# Patient Record
Sex: FEMALE | Race: WHITE | NOT HISPANIC OR LATINO | Employment: UNEMPLOYED | URBAN - METROPOLITAN AREA
[De-identification: names, ages, dates, MRNs, and addresses within clinical notes are randomized per-mention and may not be internally consistent; named-entity substitution may affect disease eponyms.]

---

## 2018-05-21 ENCOUNTER — OFFICE VISIT (OUTPATIENT)
Dept: OBSTETRICS AND GYNECOLOGY | Age: 33
End: 2018-05-21

## 2018-05-21 ENCOUNTER — TELEPHONE (OUTPATIENT)
Dept: OBSTETRICS AND GYNECOLOGY | Age: 33
End: 2018-05-21

## 2018-05-21 ENCOUNTER — PROCEDURE VISIT (OUTPATIENT)
Dept: OBSTETRICS AND GYNECOLOGY | Age: 33
End: 2018-05-21

## 2018-05-21 ENCOUNTER — ANESTHESIA EVENT (OUTPATIENT)
Dept: PERIOP | Facility: HOSPITAL | Age: 33
End: 2018-05-21

## 2018-05-21 ENCOUNTER — HOSPITAL ENCOUNTER (OUTPATIENT)
Facility: HOSPITAL | Age: 33
Setting detail: HOSPITAL OUTPATIENT SURGERY
Discharge: HOME | End: 2018-05-21
Attending: OBSTETRICS & GYNECOLOGY | Admitting: OBSTETRICS & GYNECOLOGY

## 2018-05-21 ENCOUNTER — ANESTHESIA (OUTPATIENT)
Dept: PERIOP | Facility: HOSPITAL | Age: 33
End: 2018-05-21

## 2018-05-21 ENCOUNTER — PREP FOR SURGERY (OUTPATIENT)
Dept: OTHER | Facility: HOSPITAL | Age: 33
End: 2018-05-21

## 2018-05-21 VITALS
DIASTOLIC BLOOD PRESSURE: 64 MMHG | HEIGHT: 66 IN | BODY MASS INDEX: 28.61 KG/M2 | SYSTOLIC BLOOD PRESSURE: 120 MMHG | WEIGHT: 178 LBS

## 2018-05-21 VITALS
OXYGEN SATURATION: 100 % | TEMPERATURE: 98.3 F | DIASTOLIC BLOOD PRESSURE: 78 MMHG | HEART RATE: 77 BPM | RESPIRATION RATE: 18 BRPM | SYSTOLIC BLOOD PRESSURE: 117 MMHG

## 2018-05-21 DIAGNOSIS — N83.202 CYST OF LEFT OVARY: Primary | ICD-10-CM

## 2018-05-21 DIAGNOSIS — R10.2 PELVIC PAIN: ICD-10-CM

## 2018-05-21 DIAGNOSIS — R10.2 PELVIC PAIN: Primary | ICD-10-CM

## 2018-05-21 DIAGNOSIS — R10.30 LOWER ABDOMINAL PAIN: Primary | ICD-10-CM

## 2018-05-21 DIAGNOSIS — N83.202 CYST OF LEFT OVARY: ICD-10-CM

## 2018-05-21 LAB
ABO GROUP BLD: NORMAL
B-HCG UR QL: NEGATIVE
BASOPHILS # BLD AUTO: 0.01 10*3/MM3 (ref 0–0.2)
BASOPHILS NFR BLD AUTO: 0.2 % (ref 0–1.5)
BILIRUB BLD-MCNC: NEGATIVE MG/DL
BLD GP AB SCN SERPL QL: NEGATIVE
CLARITY, POC: CLEAR
COLOR UR: YELLOW
DEPRECATED RDW RBC AUTO: 44 FL (ref 37–54)
EOSINOPHIL # BLD AUTO: 0.02 10*3/MM3 (ref 0–0.7)
EOSINOPHIL NFR BLD AUTO: 0.3 % (ref 0.3–6.2)
ERYTHROCYTE [DISTWIDTH] IN BLOOD BY AUTOMATED COUNT: 13 % (ref 11.7–13)
GLUCOSE UR STRIP-MCNC: NEGATIVE MG/DL
HCT VFR BLD AUTO: 41.4 % (ref 35.6–45.5)
HGB BLD-MCNC: 13.4 G/DL (ref 11.9–15.5)
IMM GRANULOCYTES # BLD: 0 10*3/MM3 (ref 0–0.03)
IMM GRANULOCYTES NFR BLD: 0 % (ref 0–0.5)
INTERNAL NEGATIVE CONTROL: NEGATIVE
INTERNAL POSITIVE CONTROL: POSITIVE
KETONES UR QL: NEGATIVE
LEUKOCYTE EST, POC: ABNORMAL
LYMPHOCYTES # BLD AUTO: 1.76 10*3/MM3 (ref 0.9–4.8)
LYMPHOCYTES NFR BLD AUTO: 28.3 % (ref 19.6–45.3)
Lab: NORMAL
MCH RBC QN AUTO: 30.2 PG (ref 26.9–32)
MCHC RBC AUTO-ENTMCNC: 32.4 G/DL (ref 32.4–36.3)
MCV RBC AUTO: 93.2 FL (ref 80.5–98.2)
MONOCYTES # BLD AUTO: 0.36 10*3/MM3 (ref 0.2–1.2)
MONOCYTES NFR BLD AUTO: 5.8 % (ref 5–12)
NEUTROPHILS # BLD AUTO: 4.07 10*3/MM3 (ref 1.9–8.1)
NEUTROPHILS NFR BLD AUTO: 65.4 % (ref 42.7–76)
NITRITE UR-MCNC: NEGATIVE MG/ML
PH UR: 6 [PH] (ref 5–8)
PLATELET # BLD AUTO: 266 10*3/MM3 (ref 140–500)
PMV BLD AUTO: 9.3 FL (ref 6–12)
PROT UR STRIP-MCNC: NEGATIVE MG/DL
RBC # BLD AUTO: 4.44 10*6/MM3 (ref 3.9–5.2)
RBC # UR STRIP: NEGATIVE /UL
RH BLD: POSITIVE
SP GR UR: 1.02 (ref 1–1.03)
T&S EXPIRATION DATE: NORMAL
UROBILINOGEN UR QL: NORMAL
WBC NRBC COR # BLD: 6.22 10*3/MM3 (ref 4.5–10.7)

## 2018-05-21 PROCEDURE — 58662 LAPAROSCOPY EXCISE LESIONS: CPT | Performed by: OBSTETRICS & GYNECOLOGY

## 2018-05-21 PROCEDURE — C1765 ADHESION BARRIER: HCPCS | Performed by: OBSTETRICS & GYNECOLOGY

## 2018-05-21 PROCEDURE — 25010000002 ONDANSETRON PER 1 MG: Performed by: ANESTHESIOLOGY

## 2018-05-21 PROCEDURE — 85025 COMPLETE CBC W/AUTO DIFF WBC: CPT | Performed by: OBSTETRICS & GYNECOLOGY

## 2018-05-21 PROCEDURE — 25010000002 PROPOFOL 10 MG/ML EMULSION: Performed by: ANESTHESIOLOGY

## 2018-05-21 PROCEDURE — 25010000003 CEFAZOLIN IN DEXTROSE 2-4 GM/100ML-% SOLUTION: Performed by: ANESTHESIOLOGY

## 2018-05-21 PROCEDURE — 25010000002 DEXAMETHASONE PER 1 MG: Performed by: ANESTHESIOLOGY

## 2018-05-21 PROCEDURE — 25010000002 KETOROLAC TROMETHAMINE PER 15 MG: Performed by: ANESTHESIOLOGY

## 2018-05-21 PROCEDURE — S0260 H&P FOR SURGERY: HCPCS | Performed by: OBSTETRICS & GYNECOLOGY

## 2018-05-21 PROCEDURE — 25010000002 FENTANYL CITRATE (PF) 100 MCG/2ML SOLUTION: Performed by: ANESTHESIOLOGY

## 2018-05-21 PROCEDURE — 88307 TISSUE EXAM BY PATHOLOGIST: CPT | Performed by: OBSTETRICS & GYNECOLOGY

## 2018-05-21 PROCEDURE — 99213 OFFICE O/P EST LOW 20 MIN: CPT | Performed by: OBSTETRICS & GYNECOLOGY

## 2018-05-21 PROCEDURE — 86900 BLOOD TYPING SEROLOGIC ABO: CPT | Performed by: OBSTETRICS & GYNECOLOGY

## 2018-05-21 PROCEDURE — 86850 RBC ANTIBODY SCREEN: CPT | Performed by: OBSTETRICS & GYNECOLOGY

## 2018-05-21 PROCEDURE — 86901 BLOOD TYPING SEROLOGIC RH(D): CPT | Performed by: OBSTETRICS & GYNECOLOGY

## 2018-05-21 PROCEDURE — 76830 TRANSVAGINAL US NON-OB: CPT | Performed by: OBSTETRICS & GYNECOLOGY

## 2018-05-21 PROCEDURE — 25010000002 MIDAZOLAM PER 1 MG: Performed by: ANESTHESIOLOGY

## 2018-05-21 PROCEDURE — 81025 URINE PREGNANCY TEST: CPT | Performed by: OBSTETRICS & GYNECOLOGY

## 2018-05-21 PROCEDURE — 25010000002 PHENYLEPHRINE PER 1 ML: Performed by: ANESTHESIOLOGY

## 2018-05-21 RX ORDER — HYDRALAZINE HYDROCHLORIDE 20 MG/ML
5 INJECTION INTRAMUSCULAR; INTRAVENOUS
Status: DISCONTINUED | OUTPATIENT
Start: 2018-05-21 | End: 2018-05-22 | Stop reason: HOSPADM

## 2018-05-21 RX ORDER — PROMETHAZINE HYDROCHLORIDE 25 MG/ML
12.5 INJECTION, SOLUTION INTRAMUSCULAR; INTRAVENOUS ONCE AS NEEDED
Status: DISCONTINUED | OUTPATIENT
Start: 2018-05-21 | End: 2018-05-22 | Stop reason: HOSPADM

## 2018-05-21 RX ORDER — OXYCODONE AND ACETAMINOPHEN 7.5; 325 MG/1; MG/1
1 TABLET ORAL ONCE AS NEEDED
Status: COMPLETED | OUTPATIENT
Start: 2018-05-21 | End: 2018-05-21

## 2018-05-21 RX ORDER — PROMETHAZINE HYDROCHLORIDE 25 MG/1
12.5 TABLET ORAL ONCE AS NEEDED
Status: DISCONTINUED | OUTPATIENT
Start: 2018-05-21 | End: 2018-05-22 | Stop reason: HOSPADM

## 2018-05-21 RX ORDER — HYDROMORPHONE HYDROCHLORIDE 1 MG/ML
0.5 INJECTION, SOLUTION INTRAMUSCULAR; INTRAVENOUS; SUBCUTANEOUS
Status: DISCONTINUED | OUTPATIENT
Start: 2018-05-21 | End: 2018-05-22 | Stop reason: HOSPADM

## 2018-05-21 RX ORDER — PROMETHAZINE HYDROCHLORIDE 25 MG/1
25 TABLET ORAL ONCE AS NEEDED
Status: DISCONTINUED | OUTPATIENT
Start: 2018-05-21 | End: 2018-05-22 | Stop reason: HOSPADM

## 2018-05-21 RX ORDER — ONDANSETRON 2 MG/ML
INJECTION INTRAMUSCULAR; INTRAVENOUS AS NEEDED
Status: DISCONTINUED | OUTPATIENT
Start: 2018-05-21 | End: 2018-05-21 | Stop reason: SURG

## 2018-05-21 RX ORDER — ROCURONIUM BROMIDE 10 MG/ML
INJECTION, SOLUTION INTRAVENOUS AS NEEDED
Status: DISCONTINUED | OUTPATIENT
Start: 2018-05-21 | End: 2018-05-21 | Stop reason: SURG

## 2018-05-21 RX ORDER — SODIUM CHLORIDE 0.9 % (FLUSH) 0.9 %
1-10 SYRINGE (ML) INJECTION AS NEEDED
Status: DISCONTINUED | OUTPATIENT
Start: 2018-05-21 | End: 2018-05-21 | Stop reason: HOSPADM

## 2018-05-21 RX ORDER — FENTANYL CITRATE 50 UG/ML
50 INJECTION, SOLUTION INTRAMUSCULAR; INTRAVENOUS
Status: DISCONTINUED | OUTPATIENT
Start: 2018-05-21 | End: 2018-05-21 | Stop reason: HOSPADM

## 2018-05-21 RX ORDER — IBUPROFEN 600 MG/1
TABLET ORAL
Status: COMPLETED
Start: 2018-05-21 | End: 2018-05-21

## 2018-05-21 RX ORDER — LIDOCAINE HYDROCHLORIDE 20 MG/ML
INJECTION, SOLUTION INFILTRATION; PERINEURAL AS NEEDED
Status: DISCONTINUED | OUTPATIENT
Start: 2018-05-21 | End: 2018-05-21 | Stop reason: SURG

## 2018-05-21 RX ORDER — ONDANSETRON 2 MG/ML
4 INJECTION INTRAMUSCULAR; INTRAVENOUS ONCE AS NEEDED
Status: COMPLETED | OUTPATIENT
Start: 2018-05-21 | End: 2018-05-21

## 2018-05-21 RX ORDER — SODIUM CHLORIDE, SODIUM LACTATE, POTASSIUM CHLORIDE, CALCIUM CHLORIDE 600; 310; 30; 20 MG/100ML; MG/100ML; MG/100ML; MG/100ML
9 INJECTION, SOLUTION INTRAVENOUS CONTINUOUS
Status: DISCONTINUED | OUTPATIENT
Start: 2018-05-21 | End: 2018-05-22 | Stop reason: HOSPADM

## 2018-05-21 RX ORDER — SODIUM CHLORIDE 0.9 % (FLUSH) 0.9 %
1-10 SYRINGE (ML) INJECTION AS NEEDED
Status: CANCELLED | OUTPATIENT
Start: 2018-05-21

## 2018-05-21 RX ORDER — FLUMAZENIL 0.1 MG/ML
0.2 INJECTION INTRAVENOUS AS NEEDED
Status: DISCONTINUED | OUTPATIENT
Start: 2018-05-21 | End: 2018-05-22 | Stop reason: HOSPADM

## 2018-05-21 RX ORDER — LIDOCAINE HYDROCHLORIDE 10 MG/ML
0.5 INJECTION, SOLUTION EPIDURAL; INFILTRATION; INTRACAUDAL; PERINEURAL ONCE AS NEEDED
Status: DISCONTINUED | OUTPATIENT
Start: 2018-05-21 | End: 2018-05-21 | Stop reason: HOSPADM

## 2018-05-21 RX ORDER — HYDROCODONE BITARTRATE AND ACETAMINOPHEN 7.5; 325 MG/1; MG/1
1 TABLET ORAL ONCE AS NEEDED
Status: DISCONTINUED | OUTPATIENT
Start: 2018-05-21 | End: 2018-05-22 | Stop reason: HOSPADM

## 2018-05-21 RX ORDER — FAMOTIDINE 10 MG/ML
20 INJECTION, SOLUTION INTRAVENOUS ONCE
Status: COMPLETED | OUTPATIENT
Start: 2018-05-21 | End: 2018-05-21

## 2018-05-21 RX ORDER — EPHEDRINE SULFATE 50 MG/ML
5 INJECTION, SOLUTION INTRAVENOUS ONCE AS NEEDED
Status: DISCONTINUED | OUTPATIENT
Start: 2018-05-21 | End: 2018-05-22 | Stop reason: HOSPADM

## 2018-05-21 RX ORDER — DIPHENHYDRAMINE HYDROCHLORIDE 50 MG/ML
12.5 INJECTION INTRAMUSCULAR; INTRAVENOUS
Status: DISCONTINUED | OUTPATIENT
Start: 2018-05-21 | End: 2018-05-22 | Stop reason: HOSPADM

## 2018-05-21 RX ORDER — SODIUM CHLORIDE 9 MG/ML
INJECTION, SOLUTION INTRAVENOUS AS NEEDED
Status: DISCONTINUED | OUTPATIENT
Start: 2018-05-21 | End: 2018-05-21 | Stop reason: HOSPADM

## 2018-05-21 RX ORDER — CEFAZOLIN SODIUM 2 G/100ML
INJECTION, SOLUTION INTRAVENOUS AS NEEDED
Status: DISCONTINUED | OUTPATIENT
Start: 2018-05-21 | End: 2018-05-21 | Stop reason: SURG

## 2018-05-21 RX ORDER — LABETALOL HYDROCHLORIDE 5 MG/ML
5 INJECTION, SOLUTION INTRAVENOUS
Status: DISCONTINUED | OUTPATIENT
Start: 2018-05-21 | End: 2018-05-22 | Stop reason: HOSPADM

## 2018-05-21 RX ORDER — LIDOCAINE HYDROCHLORIDE AND EPINEPHRINE BITARTRATE 20; .01 MG/ML; MG/ML
INJECTION, SOLUTION SUBCUTANEOUS AS NEEDED
Status: DISCONTINUED | OUTPATIENT
Start: 2018-05-21 | End: 2018-05-21 | Stop reason: HOSPADM

## 2018-05-21 RX ORDER — PROPOFOL 10 MG/ML
VIAL (ML) INTRAVENOUS AS NEEDED
Status: DISCONTINUED | OUTPATIENT
Start: 2018-05-21 | End: 2018-05-21 | Stop reason: SURG

## 2018-05-21 RX ORDER — DEXAMETHASONE SODIUM PHOSPHATE 10 MG/ML
INJECTION INTRAMUSCULAR; INTRAVENOUS AS NEEDED
Status: DISCONTINUED | OUTPATIENT
Start: 2018-05-21 | End: 2018-05-21 | Stop reason: SURG

## 2018-05-21 RX ORDER — FENTANYL CITRATE 50 UG/ML
50 INJECTION, SOLUTION INTRAMUSCULAR; INTRAVENOUS
Status: DISCONTINUED | OUTPATIENT
Start: 2018-05-21 | End: 2018-05-22 | Stop reason: HOSPADM

## 2018-05-21 RX ORDER — IBUPROFEN 600 MG/1
600 TABLET ORAL ONCE AS NEEDED
Status: COMPLETED | OUTPATIENT
Start: 2018-05-21 | End: 2018-05-21

## 2018-05-21 RX ORDER — NALOXONE HCL 0.4 MG/ML
0.2 VIAL (ML) INJECTION AS NEEDED
Status: DISCONTINUED | OUTPATIENT
Start: 2018-05-21 | End: 2018-05-22 | Stop reason: HOSPADM

## 2018-05-21 RX ORDER — MIDAZOLAM HYDROCHLORIDE 1 MG/ML
2 INJECTION INTRAMUSCULAR; INTRAVENOUS
Status: DISCONTINUED | OUTPATIENT
Start: 2018-05-21 | End: 2018-05-21 | Stop reason: HOSPADM

## 2018-05-21 RX ORDER — KETOROLAC TROMETHAMINE 30 MG/ML
INJECTION, SOLUTION INTRAMUSCULAR; INTRAVENOUS AS NEEDED
Status: DISCONTINUED | OUTPATIENT
Start: 2018-05-21 | End: 2018-05-21 | Stop reason: SURG

## 2018-05-21 RX ORDER — MIDAZOLAM HYDROCHLORIDE 1 MG/ML
1 INJECTION INTRAMUSCULAR; INTRAVENOUS
Status: DISCONTINUED | OUTPATIENT
Start: 2018-05-21 | End: 2018-05-21 | Stop reason: HOSPADM

## 2018-05-21 RX ORDER — IBUPROFEN 600 MG/1
600 TABLET ORAL EVERY 6 HOURS PRN
Qty: 28 TABLET | Refills: 0 | Status: SHIPPED | OUTPATIENT
Start: 2018-05-21 | End: 2018-06-26

## 2018-05-21 RX ORDER — PROMETHAZINE HYDROCHLORIDE 25 MG/1
25 SUPPOSITORY RECTAL ONCE AS NEEDED
Status: DISCONTINUED | OUTPATIENT
Start: 2018-05-21 | End: 2018-05-22 | Stop reason: HOSPADM

## 2018-05-21 RX ORDER — OXYCODONE HYDROCHLORIDE AND ACETAMINOPHEN 5; 325 MG/1; MG/1
2 TABLET ORAL EVERY 6 HOURS PRN
Qty: 20 TABLET | Refills: 0 | Status: SHIPPED | OUTPATIENT
Start: 2018-05-21 | End: 2018-06-26

## 2018-05-21 RX ADMIN — FENTANYL CITRATE 50 MCG: 50 INJECTION INTRAMUSCULAR; INTRAVENOUS at 20:03

## 2018-05-21 RX ADMIN — ROCURONIUM BROMIDE 10 MG: 10 INJECTION INTRAVENOUS at 19:54

## 2018-05-21 RX ADMIN — FENTANYL CITRATE 50 MCG: 50 INJECTION INTRAMUSCULAR; INTRAVENOUS at 21:14

## 2018-05-21 RX ADMIN — DEXAMETHASONE SODIUM PHOSPHATE 8 MG: 10 INJECTION INTRAMUSCULAR; INTRAVENOUS at 18:24

## 2018-05-21 RX ADMIN — MIDAZOLAM 1 MG: 1 INJECTION INTRAMUSCULAR; INTRAVENOUS at 17:59

## 2018-05-21 RX ADMIN — IBUPROFEN 600 MG: 600 TABLET ORAL at 21:41

## 2018-05-21 RX ADMIN — PHENYLEPHRINE HYDROCHLORIDE 100 MCG: 10 INJECTION INTRAVENOUS at 18:34

## 2018-05-21 RX ADMIN — FENTANYL CITRATE 50 MCG: 50 INJECTION INTRAMUSCULAR; INTRAVENOUS at 16:59

## 2018-05-21 RX ADMIN — KETOROLAC TROMETHAMINE 30 MG: 30 INJECTION, SOLUTION INTRAMUSCULAR; INTRAVENOUS at 20:17

## 2018-05-21 RX ADMIN — ONDANSETRON 4 MG: 2 INJECTION INTRAMUSCULAR; INTRAVENOUS at 20:17

## 2018-05-21 RX ADMIN — ONDANSETRON 4 MG: 2 INJECTION INTRAMUSCULAR; INTRAVENOUS at 21:14

## 2018-05-21 RX ADMIN — PROPOFOL 160 MG: 10 INJECTION, EMULSION INTRAVENOUS at 18:10

## 2018-05-21 RX ADMIN — CEFAZOLIN SODIUM 2 G: 2 INJECTION, SOLUTION INTRAVENOUS at 18:15

## 2018-05-21 RX ADMIN — FAMOTIDINE 20 MG: 10 INJECTION, SOLUTION INTRAVENOUS at 16:59

## 2018-05-21 RX ADMIN — FENTANYL CITRATE 100 MCG: 50 INJECTION INTRAMUSCULAR; INTRAVENOUS at 18:10

## 2018-05-21 RX ADMIN — ROCURONIUM BROMIDE 40 MG: 10 INJECTION INTRAVENOUS at 18:10

## 2018-05-21 RX ADMIN — FENTANYL CITRATE 50 MCG: 50 INJECTION INTRAMUSCULAR; INTRAVENOUS at 19:23

## 2018-05-21 RX ADMIN — SUGAMMADEX 161 MG: 100 INJECTION, SOLUTION INTRAVENOUS at 20:19

## 2018-05-21 RX ADMIN — SODIUM CHLORIDE, POTASSIUM CHLORIDE, SODIUM LACTATE AND CALCIUM CHLORIDE 9 ML/HR: 600; 310; 30; 20 INJECTION, SOLUTION INTRAVENOUS at 17:00

## 2018-05-21 RX ADMIN — OXYCODONE HYDROCHLORIDE AND ACETAMINOPHEN 1 TABLET: 7.5; 325 TABLET ORAL at 21:37

## 2018-05-21 RX ADMIN — LIDOCAINE HYDROCHLORIDE 80 MG: 20 INJECTION, SOLUTION INFILTRATION; PERINEURAL at 18:10

## 2018-05-21 NOTE — PROGRESS NOTES
Subjective     Chief Complaint   Patient presents with   • Gynecologic Exam     Problem:Left side lower abdominal pain x last 3 nights       History of Present Illness  Stephanie Cobb is a 32 y.o. female is being seen today for Evaluation of pelvic pain. Patient has had several negative pregnancy test. Her cycles are normal 33-35 days apart. She is a little bit late this cycle as her last menstrual period was about 6 weeks ago. She has had intermittent pelvic pain more pronounced on the left side that seems to come and go. Denies fever chills nausea vomiting. Her appetite is okay and her other activities are normal  Chief Complaint   Patient presents with   • Gynecologic Exam     Problem:Left side lower abdominal pain x last 3 nights   .        The following portions of the patient's history were reviewed and updated as appropriate: allergies, current medications, past family history, past medical history, past social history, past surgical history and problem list.    PAST MEDICAL HISTORY  Past Medical History:   Diagnosis Date   • Urinary tract infection      OB History      Para Term  AB Living    3 3 3     3    SAB TAB Ectopic Molar Multiple Live Births            0 3        Past Surgical History:   Procedure Laterality Date   • DENTAL PROCEDURE Left    • WISDOM TOOTH EXTRACTION       Family History   Problem Relation Age of Onset   • Hypertension Father    • Hypertension Maternal Grandfather      History   Smoking Status   • Never Smoker   Smokeless Tobacco   • Never Used       Current Outpatient Prescriptions:   •  ibuprofen (ADVIL,MOTRIN) 600 MG tablet, Take 1 tablet by mouth every 8 (eight) hours as needed for mild pain (1-3)., Disp: 40 tablet, Rfl: 3  •  Prenatal Vit-Fe Fumarate-FA (PRENATAL, CLASSIC, VITAMIN) 28-0.8 MG tablet tablet, Take  by mouth daily., Disp: , Rfl:     There is no immunization history on file for this patient.    Review of Systems       Except as outlined in history of  physical illness, patient denies any changes in her GYN, , GI systems. All other systems reviewed are negative.    Objective   Physical Exam   Alert and oriented, respirations unlabored, heart regular rate and rhythm, Abdomen soft benign positive bowel sounds no rebound no guarding   PelvicExternal genitalia normal vagina cervix uterus normal right adnexa and normal left adnexa is enlarged and tender to palpation. Consistent with 8-10 cm in size Rectal exam confirms      Assessment/Plan   Stephanie was seen today for gynecologic exam.    Diagnoses and all orders for this visit:    Lower abdominal pain  -     POC Pregnancy, Urine    Pelvic pain  Negative UCG, suspect left ovarian cyst. We'll check ultrasound.    Ultrasound confirms 10-11 cm left ovarian cyst very tender to touch. Simple outer wall on ultrasound. Because of patient's discomfort she would like to proceed with surgical evaluation. We have discussed risks benefits potential need for oophorectomy. Risk of bleeding infection injury to other organs and anesthetic complications and potential need for follow-up corrective surgeries reviewed.               EMR Dragon/ Transcription disclaimer:  Much of the encounter note is an electronic transcription/translation of spoken language to printed text. The electronic translation of spoken language may permit erroneous, or at times, nonessential words or phrases to be inadvertently transcribes; Although i have reviewed the note for such errors, some may still exist.

## 2018-05-21 NOTE — TELEPHONE ENCOUNTER
Pt states on day 40 of cycle, for the last 3 nights she has experienced lower abd pain that radiates around her side and into her back. Pt has considered going to ER pain is so bad. Pt states - UPT. Please advise.    Pt # [phone]

## 2018-05-21 NOTE — ANESTHESIA PREPROCEDURE EVALUATION
Anesthesia Evaluation     Patient summary reviewed   NPO Solid Status: > 6 hours  NPO Liquid Status: > 2 hours           Airway   Mallampati: I  TM distance: >3 FB  Dental      Pulmonary    Cardiovascular     Rhythm: regular  Rate: normal        Neuro/Psych  GI/Hepatic/Renal/Endo      Musculoskeletal     Abdominal    Substance History      OB/GYN          Other                        Anesthesia Plan    ASA 1 - emergent     general     intravenous induction   Anesthetic plan and risks discussed with patient.

## 2018-05-21 NOTE — ANESTHESIA PROCEDURE NOTES
Airway  Urgency: elective    Airway not difficult    General Information and Staff    Patient location during procedure: OR  Anesthesiologist: BROOKE MCNAIR    Indications and Patient Condition  Indications for airway management: airway protection    Preoxygenated: yes  Mask difficulty assessment: 1 - vent by mask    Final Airway Details  Final airway type: endotracheal airway      Successful airway: ETT  Cuffed: yes   Successful intubation technique: direct laryngoscopy  Endotracheal tube insertion site: oral  Blade: Tori  ETT size: 7.0 mm  Cormack-Lehane Classification: grade I - full view of glottis  Placement verified by: chest auscultation and capnometry   Cuff volume (mL): 4  Measured from: teeth  ETT to teeth (cm): 21  Number of attempts at approach: 1

## 2018-05-22 ENCOUNTER — TELEPHONE (OUTPATIENT)
Dept: OBSTETRICS AND GYNECOLOGY | Age: 33
End: 2018-05-22

## 2018-05-22 NOTE — ANESTHESIA POSTPROCEDURE EVALUATION
Patient: Stephanie Cobb    Procedure Summary     Date:  05/21/18 Room / Location:  [facility] OR [address] [place] [address] MAIN OR    Anesthesia Start:  1802 Anesthesia Stop:  2042    Procedure:  DIAGNOSTIC LAPAROSCOPY/LT.OVARIAN CYSTECTOMY (Left Abdomen) Diagnosis:       Cyst of left ovary      (Cyst of left ovary [N83.202])    Surgeon:  Juan Ramon Dias MD Provider:  Bassem Jacobson MD    Anesthesia Type:  general ASA Status:  1 - Emergent          Anesthesia Type: general  Last vitals  BP   120/79 (05/21/18 2110)   Temp   36.8 °C (98.3 °F) (05/21/18 2040)   Pulse   77 (05/21/18 2110)   Resp   18 (05/21/18 2110)     SpO2   100 % (05/21/18 2110)     Post Anesthesia Care and Evaluation    Patient location during evaluation: bedside  Patient participation: complete - patient participated  Level of consciousness: awake  Pain management: adequate  Airway patency: patent  Anesthetic complications: No anesthetic complications    Cardiovascular status: acceptable  Respiratory status: acceptable  Hydration status: acceptable    Comments: */79   Pulse 77   Temp 36.8 °C (98.3 °F) (Oral)   Resp 18   LMP 04/12/2018   SpO2 100%

## 2018-05-22 NOTE — PROGRESS NOTES
Phone conversation. Called patient this morning following her surgery. She states she feels so much better. Is voiding and passing flatus and pain is being controlled with ibuprofen. Discussed surgical findings and need for follow-up in 1 week. Patient will call with any changes.

## 2018-05-22 NOTE — OP NOTE
Laparoscopy with left ovarian cystectomy Procedure Note    Indications: The patient is a 32 y.o. female with large left ovarian cyst with worsening pelvic pain.    Pre-operative Diagnosis: Left ovarian cyst, 10-12 cm    Post-operative Diagnosis: same, 12 cm, normal right tube and ovary. Normal liver and gallbladder normal upper and lower abdomen as seen on    Surgeon: Juan Ramon Dias MD     Assistants: Juan Ramon Moser    Anesthesia: General endotracheal anesthesia    ASA Class: 1    Procedure Details   The patient was seen in the Holding Room. The risks, benefits, complications, treatment options, and expected outcomes were discussed with the patient. The possibilities of reaction to medication, pulmonary aspiration, perforation of viscus, bleeding, recurrent infection, the need for additional procedures, failure to diagnose a condition, and creating a complication requiring transfusion or operation were discussed with the patient. The patient concurred with the proposed plan, giving informed consent. The patient was taken to the Operating Room, identified as Stephanie Cobb and the procedure verified as Diagnostic Laparoscopy. A Time Out was held and the above information confirmed.    After induction of general anesthesia, the patient was placed in modified dorsal lithotomy position where she was prepped, draped, and catheterized in the normal, sterile fashion.    The cervix was visualized and an intrauterine manipulator was placed. 9 mm umbilical incision was then performed. Veress needle was passed and pneumoperitoneum was established. The 10 trocar was utilized and anchored to the fascia after extending the incisions. The above findings were noted. .  2 more 5 mm incisions were incised lateral to the umbilical incision. As done as we transilluminated the abdominal wall. entry was visualized with the laparoscope. The uterus was elevated after we inserted 2 more 5 mm trochars and the above-mentioned findings were  noted. A small puncture was made in the top of the ovarian cyst and approximately 250 cc of fluid was aspirated. Irrigation was performed. Then we utilized the Harmonic to excise the ovarian wall circumferentially down to what we thought was the base of the normal ovarian tissue.. This required us to excise 3 separate strips of ovarian cyst wall. These were placed anterior to the uterus. Several attempts to try to peel out any remaining ovarian cyst membranes on the residual ovary were unsuccessful. The Harmonic was utilized with the active blade to paint brush in a little cautery over the residual open ovarian sidewalls. Everything was hemostatic at this stage.  Several more rounds of irrigation were performed. The 1012 trocar was in place. Through this the Endopouch was utilized. The ovarian cyst walls were inserted and retrieved under visualization. The mid trocar was removed Allis clamps were placed. Irrigation was used visualization found having to be hemostatic no other abnormalities were seen. Incisions were closed by closing the fascia on the middle incision with 0 Vicryl. Then the skin edges were reapproximated using 4-0 Vicryl. Betadine Steri-Strips and bandages were applied. Hulka tenaculum was removed. The Zuniga catheter which had been inserted prior to the procedure was removed. Clear urine was noted. She was awakened and taken recovery room in stable condition      Following the procedure the umbilical sheath was removed after intra-abdominal carbon dioxide was expressed. The incision was closed with subcutaneous and subcuticular sutures of 4-0 Vicryl. The intrauterine manipulator was then removed.    Instrument, sponge, and needle counts were correct prior to abdominal closure and at the conclusion of the case.     Findings:  As outlined above, a 10-12 cm left ovarian cyst smooth edges were noted,no excrescences were seen. The uterus opposite tube and ovary were normal liver appeared normal as did  gallbladder.    Estimated Blood Loss:  less than 50 mL           Drains: None                      S

## 2018-05-23 ENCOUNTER — TELEPHONE (OUTPATIENT)
Dept: OBSTETRICS AND GYNECOLOGY | Age: 33
End: 2018-05-23

## 2018-05-23 LAB
CYTO UR: NORMAL
LAB AP CASE REPORT: NORMAL
Lab: NORMAL
PATH REPORT.FINAL DX SPEC: NORMAL
PATH REPORT.GROSS SPEC: NORMAL

## 2018-05-23 NOTE — TELEPHONE ENCOUNTER
----- Message from Juan Ramon Dias MD sent at 5/23/2018  1:01 PM EDT -----  Please notify pt. That labs are with in normal limits

## 2018-05-29 ENCOUNTER — OFFICE VISIT (OUTPATIENT)
Dept: OBSTETRICS AND GYNECOLOGY | Age: 33
End: 2018-05-29

## 2018-05-29 VITALS
SYSTOLIC BLOOD PRESSURE: 106 MMHG | WEIGHT: 173 LBS | HEIGHT: 66 IN | DIASTOLIC BLOOD PRESSURE: 68 MMHG | BODY MASS INDEX: 27.8 KG/M2

## 2018-05-29 DIAGNOSIS — Z09 POSTOP CHECK: ICD-10-CM

## 2018-05-29 DIAGNOSIS — I80.01 THROMBOPHLEBITIS OF SUPERFICIAL VEINS OF RIGHT LOWER EXTREMITY: ICD-10-CM

## 2018-05-29 DIAGNOSIS — R53.83 OTHER FATIGUE: Primary | ICD-10-CM

## 2018-05-29 PROCEDURE — 99024 POSTOP FOLLOW-UP VISIT: CPT | Performed by: OBSTETRICS & GYNECOLOGY

## 2018-05-29 RX ORDER — ACETAMINOPHEN AND CODEINE PHOSPHATE 300; 30 MG/1; MG/1
TABLET ORAL
Refills: 0 | COMMUNITY
Start: 2018-05-21 | End: 2018-06-26

## 2018-05-29 NOTE — PROGRESS NOTES
Postop 1 week incision check  Overall patient is feeling better. She was seen in the emergency room at [facility] for what turned out to be superficial thrombophlebitis of her right leg. Patient states Doppler scanning were negative for any evidence DVT.  Additionally, patient has a little bit of fatigue and we're going to check a CBC today.  She is tolerating regular food ambulating voiding.  Incisions abdomen bimanual exam normal extremities show negative Homans sign bilaterally, good pulses bilaterally there's a very small superficial thrombophlebitis on the medial aspect of the right leg at the knee level.  1 week postop incision check doing well pathology benign. ER evaluation normal recommend baby aspirin warm compresses. We'll check a CBC today return in 5 weeks otherwise

## 2018-05-30 LAB
ERYTHROCYTE [DISTWIDTH] IN BLOOD BY AUTOMATED COUNT: 13.2 % (ref 11.7–13)
HCT VFR BLD AUTO: 41.2 % (ref 35.6–45.5)
HGB BLD-MCNC: 13.5 G/DL (ref 11.9–15.5)
MCH RBC QN AUTO: 30.5 PG (ref 26.9–32)
MCHC RBC AUTO-ENTMCNC: 32.8 G/DL (ref 32.4–36.3)
MCV RBC AUTO: 93.2 FL (ref 80.5–98.2)
PLATELET # BLD AUTO: 333 10*3/MM3 (ref 140–500)
RBC # BLD AUTO: 4.42 10*6/MM3 (ref 3.9–5.2)
WBC # BLD AUTO: 5.77 10*3/MM3 (ref 4.5–10.7)

## 2018-06-18 ENCOUNTER — TELEPHONE (OUTPATIENT)
Dept: OBSTETRICS AND GYNECOLOGY | Age: 33
End: 2018-06-18

## 2018-06-18 NOTE — TELEPHONE ENCOUNTER
Pt states she has a cystectomy on 05/21/2018, has noticed an increase in fatigue since then as well as no period since 04/12/2018. Pt states she is very regular and is concerned. Pt wants to know if she needs to be seen before her post-op on 07/03/2018. Please advise    Pt # [phone]    It is not unusual to skip a period after that surgery, fatigue is expected. I would be glad to see her for her postop visit however if she would like to be seen sooner

## 2018-06-26 ENCOUNTER — OFFICE VISIT (OUTPATIENT)
Dept: OBSTETRICS AND GYNECOLOGY | Age: 33
End: 2018-06-26

## 2018-06-26 VITALS
SYSTOLIC BLOOD PRESSURE: 126 MMHG | WEIGHT: 174 LBS | BODY MASS INDEX: 27.97 KG/M2 | HEIGHT: 66 IN | DIASTOLIC BLOOD PRESSURE: 70 MMHG

## 2018-06-26 DIAGNOSIS — Z09 POSTOP CHECK: Primary | ICD-10-CM

## 2018-06-26 DIAGNOSIS — N83.202 CYST OF LEFT OVARY: ICD-10-CM

## 2018-06-26 PROCEDURE — 99024 POSTOP FOLLOW-UP VISIT: CPT | Performed by: OBSTETRICS & GYNECOLOGY

## 2018-06-26 NOTE — PROGRESS NOTES
June 26, 2018    Postoperative examination    174 pounds, blood pressure 126/70    Patient underwent a laparoscopic left ovarian cystectomy. 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 Her energy levels have come back. She's had relations, she is tolerating regular food ambulating passing flatus having normal bowel movements    Incisions are clean dry intact, abdomen is soft nontender extra genitalia normal vagina cervix uterus adnexa normal    Normal postoperative exam pathology reviewed patient is moving to [facility] for a  year. Patient will call with any proms questions concerns otherwise

## 2018-09-12 ENCOUNTER — OFFICE VISIT (OUTPATIENT)
Dept: OBSTETRICS AND GYNECOLOGY | Age: 33
End: 2018-09-12

## 2018-09-12 ENCOUNTER — PROCEDURE VISIT (OUTPATIENT)
Dept: OBSTETRICS AND GYNECOLOGY | Age: 33
End: 2018-09-12

## 2018-09-12 VITALS
SYSTOLIC BLOOD PRESSURE: 104 MMHG | HEIGHT: 66 IN | DIASTOLIC BLOOD PRESSURE: 64 MMHG | BODY MASS INDEX: 27.64 KG/M2 | WEIGHT: 172 LBS

## 2018-09-12 DIAGNOSIS — O36.80X0 ENCOUNTER TO DETERMINE FETAL VIABILITY OF PREGNANCY, SINGLE OR UNSPECIFIED FETUS: Primary | ICD-10-CM

## 2018-09-12 DIAGNOSIS — Z34.90 EARLY STAGE OF PREGNANCY: Primary | ICD-10-CM

## 2018-09-12 PROCEDURE — 76817 TRANSVAGINAL US OBSTETRIC: CPT | Performed by: OBSTETRICS & GYNECOLOGY

## 2018-09-12 PROCEDURE — 0500F INITIAL PRENATAL CARE VISIT: CPT | Performed by: PHYSICIAN ASSISTANT

## 2018-09-12 NOTE — PROGRESS NOTES
"Subjective     Chief Complaint   Patient presents with   • Possible Pregnancy     pregnancy confirmation       Stephanie Cobb is a 33 y.o.  whose LMP is Patient's last menstrual period was 2018 (exact date). presents to establish pregnancy  She is scheduled for an u/s today  LMP was   Says her periods are \"long\"  She is a pt of Dr Dias's  He has delivered her other children  She is currently living out of town in [place]  Plans to move back in Dec  Has questions about how to manage her ob care remotely.       No Additional Complaints Reported    The following portions of the patient's history were reviewed and updated as appropriate:vital signs, allergies, current medications, past family history, past medical history, past social history, past surgical history and problem list      Review of Systems   Genitourinary:positive for pregnancy  Endocrine: positive for fatigue     Objective      /64   Ht 167.6 cm (66\")   Wt 78 kg (172 lb)   LMP 2018 (Exact Date)   BMI 27.76 kg/m²     Physical Exam   HENT:   Mouth/Throat: No oropharyngeal exudate.       General:   alert, comfortable and no distress   Heart: Not performed today   Lungs: Not performed today.   Breast: Not performed today   Neck: na   Abdomen: {Not performed today   CVA: Not performed today   Pelvis: Not performed today   Extremities: Not performed today   Neurologic: negative   Psychiatric: Normal affect, judgement, and mood       Lab Review   Labs: No data reviewed     Imaging   Ultrasound - Pelvic Vaginal  U/s shows GS measuring 6 wks 1 day.  Yolk sac, no fetal pole seen. Follicles noted, right measures 1.95 x 1.61 and left measures1.9 x 1.61 cm    Assessment/Plan     ASSESSMENT  1. Early stage of pregnancy        PLAN  1. D/w pt that overall reassuring u/s, however she is off by a week. She says this is common for her based on her prolonged periods.  I have suggested a f/u u/s in 2 wks however [place].  I am " going to send a message to Dr Dias to have him call pt to discuss ob care as she has questions about how to proceed.             Follow up: 2 week(s)    JULIO Cortes  9/12/2018

## 2018-09-13 ENCOUNTER — DOCUMENTATION (OUTPATIENT)
Dept: OBSTETRICS AND GYNECOLOGY | Age: 33
End: 2018-09-13

## 2018-09-13 NOTE — PROGRESS NOTES
Called from [place]   Wants to be seen in December for ultrasound and visit, as well as deliver here   Its ok with me

## 2018-12-19 ENCOUNTER — ROUTINE PRENATAL (OUTPATIENT)
Dept: OBSTETRICS AND GYNECOLOGY | Age: 33
End: 2018-12-19

## 2018-12-19 ENCOUNTER — PROCEDURE VISIT (OUTPATIENT)
Dept: OBSTETRICS AND GYNECOLOGY | Age: 33
End: 2018-12-19

## 2018-12-19 VITALS — BODY MASS INDEX: 30.99 KG/M2 | SYSTOLIC BLOOD PRESSURE: 120 MMHG | WEIGHT: 192 LBS | DIASTOLIC BLOOD PRESSURE: 72 MMHG

## 2018-12-19 DIAGNOSIS — O09.529 ANTEPARTUM MULTIGRAVIDA OF ADVANCED MATERNAL AGE: Primary | ICD-10-CM

## 2018-12-19 DIAGNOSIS — Z36.89 ENCOUNTER FOR FETAL ANATOMIC SURVEY: Primary | ICD-10-CM

## 2018-12-19 LAB
EXTERNAL ANTIBODY SCREEN: NORMAL
HCV AB S/CO SERPL IA: NORMAL
RUBV IGG SERPL IA-ACNC: (no result)

## 2018-12-19 PROCEDURE — 76805 OB US >/= 14 WKS SNGL FETUS: CPT | Performed by: OBSTETRICS & GYNECOLOGY

## 2018-12-19 PROCEDURE — 0502F SUBSEQUENT PRENATAL CARE: CPT | Performed by: OBSTETRICS & GYNECOLOGY

## 2018-12-19 RX ORDER — RIZATRIPTAN BENZOATE 5 MG/1
5-10 TABLET ORAL
COMMUNITY
Start: 2018-03-08 | End: 2019-05-04 | Stop reason: HOSPADM

## 2018-12-19 NOTE — PROGRESS NOTES
Chief Complaint   Patient presents with   • Pregnancy Ultrasound     Anatomy scan     HPI- Pt is 33 y.o.  at 20w1d here for prenatal visit.   ROS-     - No vaginal bleeding    GI- No abdominal pain  /72   Wt 87.1 kg (192 lb)   LMP 2018 (Exact Date)   BMI 30.99 kg/m²   Exam - See flow sheet  Fetal heart rate is normal    Assessment-  Diagnoses and all orders for this visit:    Antepartum multigravida of advanced maternal age    Patient has been receiving all of her prenatal care in [facility]. Pregnancies been uncomplicated reviewed the ultrasound that we did at 6 weeks. Reviewed her prenatal and lab work. Today's ultrasound is reassuring, patient plans on returning in early April to finish out her pregnancy and let us deliver her. Discussed some of the upcoming tests that she will need to have performed.

## 2019-01-14 ENCOUNTER — TELEPHONE (OUTPATIENT)
Dept: OBSTETRICS AND GYNECOLOGY | Age: 34
End: 2019-01-14

## 2019-01-14 NOTE — TELEPHONE ENCOUNTER
Dr Dias pt 24 wks pregnant and says her shoulder was dislocated while her daughter was falling of the couch pt reports. States her shoulder popped back in place & the pain has gotten better but is really sore and is fearful that it could happen again. Wants to ask Dr Dias if she should be seen now as she is currently out of town in [facility], snowed in. Place advise or call.     If the shoulder popped back in, she does not have to go to to the emergency room but I would recommend making an appointment with orthopedist ASAP to further evaluate. Starting here

## 2019-03-01 ENCOUNTER — CLINICAL SUPPORT (OUTPATIENT)
Dept: OBSTETRICS AND GYNECOLOGY | Age: 34
End: 2019-03-01

## 2019-03-01 DIAGNOSIS — Z23 ENCOUNTER FOR ADMINISTRATION OF VACCINE: ICD-10-CM

## 2019-03-01 PROCEDURE — 90715 TDAP VACCINE 7 YRS/> IM: CPT | Performed by: OBSTETRICS & GYNECOLOGY

## 2019-03-01 PROCEDURE — 90471 IMMUNIZATION ADMIN: CPT | Performed by: OBSTETRICS & GYNECOLOGY

## 2019-04-07 ENCOUNTER — TELEPHONE (OUTPATIENT)
Dept: OBSTETRICS AND GYNECOLOGY | Age: 34
End: 2019-04-07

## 2019-04-07 NOTE — TELEPHONE ENCOUNTER
Called pt back after she paged MD on call. She notes she is 36 weeks pregnant and has been traveling. She started care with Dr. Dias but moved and is coming back to resume care and has appt in the AM. She notes some increase in swelling in both her feet and ankles this am. She denies any leg or calf pain. She notes swelling is bilateral and symmetric. She denies significant headache, vision changes or abdominal pain. She reports normal fetal movement and denies vaginal bleeding or leaking fluid. She denies any issues with pregnancy with her OB prior to moving and normal appt last week. She notes BP's have been normal with visits and she has a home BP cuff and it was normal at home this am at 111/78. Recommended she come to L&D for further BP monitoring and further evaluation as indicated. She notes she may prefer to stay at home for now and see if swelling improves with further rest. She was counseled that this is reasonable and but would advise she come in immediately should she experience significant headache, abdominal pain, vision changes, increased swelling or decreased fetal movement. She has appt tomorrow with Dr. Dias for further assessment if she chooses to observe at home.

## 2019-04-08 ENCOUNTER — ROUTINE PRENATAL (OUTPATIENT)
Dept: OBSTETRICS AND GYNECOLOGY | Age: 34
End: 2019-04-08

## 2019-04-08 VITALS — BODY MASS INDEX: 35.35 KG/M2 | DIASTOLIC BLOOD PRESSURE: 72 MMHG | WEIGHT: 219 LBS | SYSTOLIC BLOOD PRESSURE: 122 MMHG

## 2019-04-08 DIAGNOSIS — Z34.83 PRENATAL CARE, SUBSEQUENT PREGNANCY, THIRD TRIMESTER: Primary | ICD-10-CM

## 2019-04-08 DIAGNOSIS — O09.529 ANTEPARTUM MULTIGRAVIDA OF ADVANCED MATERNAL AGE: ICD-10-CM

## 2019-04-08 PROCEDURE — 0502F SUBSEQUENT PRENATAL CARE: CPT | Performed by: OBSTETRICS & GYNECOLOGY

## 2019-04-08 NOTE — PROGRESS NOTES
Chief Complaint   Patient presents with   • Routine Prenatal Visit     HPI- Pt is 33 y.o.  at 35w6d here for prenatal visit.     ROS-     - No vaginal bleeding    GI- No abdominal pain    /72   Wt 99.3 kg (219 lb)   LMP 2018 (Exact Date)   BMI 35.35 kg/m²   Exam - See flow sheet    Fetal heart rate is normal    Assessment-  Diagnoses and all orders for this visit:    Prenatal care, subsequent pregnancy, third trimester  -     Strep B Screen - Swab, Vagina    Antepartum multigravida of advanced maternal age    Patient just got back from [facility].  She is on prenatal vitamins with some extra iron.  She had some swelling yesterday that has significantly improved her blood pressure is normal negative protein and she is feeling well.  Reports excellent fetal activity.  GBS collected today  Cervix 2-60% -2  Start BPP's each week  Signs symptoms of labor reviewed

## 2019-04-10 LAB — GP B STREP DNA SPEC QL NAA+PROBE: NEGATIVE

## 2019-04-16 ENCOUNTER — PROCEDURE VISIT (OUTPATIENT)
Dept: OBSTETRICS AND GYNECOLOGY | Age: 34
End: 2019-04-16

## 2019-04-16 ENCOUNTER — ROUTINE PRENATAL (OUTPATIENT)
Dept: OBSTETRICS AND GYNECOLOGY | Age: 34
End: 2019-04-16

## 2019-04-16 VITALS — WEIGHT: 221 LBS | SYSTOLIC BLOOD PRESSURE: 122 MMHG | BODY MASS INDEX: 35.67 KG/M2 | DIASTOLIC BLOOD PRESSURE: 70 MMHG

## 2019-04-16 DIAGNOSIS — O09.529 ANTEPARTUM MULTIGRAVIDA OF ADVANCED MATERNAL AGE: Primary | ICD-10-CM

## 2019-04-16 PROCEDURE — 0502F SUBSEQUENT PRENATAL CARE: CPT | Performed by: OBSTETRICS & GYNECOLOGY

## 2019-04-16 PROCEDURE — 76819 FETAL BIOPHYS PROFIL W/O NST: CPT | Performed by: OBSTETRICS & GYNECOLOGY

## 2019-04-16 NOTE — PROGRESS NOTES
Chief Complaint   Patient presents with   • Pregnancy Ultrasound     HPI- Pt is 33 y.o.  at 37w0d here for prenatal visit.     ROS-     - No vaginal bleeding    GI- No abdominal pain    /70   Wt 100 kg (221 lb)   LMP 2018 (Exact Date)   BMI 35.67 kg/m²   Exam - See flow sheet    Fetal heart rate is normal    Assessment-  Diagnoses and all orders for this visit:    Antepartum multigravida of advanced maternal age    Cervix still 2 cm 50% -2 BPP 8 out of 8 GBS negative continue baby aspirin, patient has signed tubal ligation papers.

## 2019-04-23 ENCOUNTER — ROUTINE PRENATAL (OUTPATIENT)
Dept: OBSTETRICS AND GYNECOLOGY | Age: 34
End: 2019-04-23

## 2019-04-23 ENCOUNTER — PROCEDURE VISIT (OUTPATIENT)
Dept: OBSTETRICS AND GYNECOLOGY | Age: 34
End: 2019-04-23

## 2019-04-23 VITALS — DIASTOLIC BLOOD PRESSURE: 70 MMHG | BODY MASS INDEX: 36.15 KG/M2 | WEIGHT: 224 LBS | SYSTOLIC BLOOD PRESSURE: 116 MMHG

## 2019-04-23 DIAGNOSIS — O09.523 ELDERLY MULTIGRAVIDA IN THIRD TRIMESTER: Primary | ICD-10-CM

## 2019-04-23 DIAGNOSIS — O09.529 ANTEPARTUM MULTIGRAVIDA OF ADVANCED MATERNAL AGE: Primary | ICD-10-CM

## 2019-04-23 PROCEDURE — 0502F SUBSEQUENT PRENATAL CARE: CPT | Performed by: OBSTETRICS & GYNECOLOGY

## 2019-04-23 PROCEDURE — 76819 FETAL BIOPHYS PROFIL W/O NST: CPT | Performed by: OBSTETRICS & GYNECOLOGY

## 2019-04-23 NOTE — PROGRESS NOTES
Patient is tired but otherwise doing well.  Excellent fetal activity today's BPP 8 out of 8, cervix 360% -2.  Signs symptoms of labor reviewed.  Would recommend induction next week if does not go into labor

## 2019-04-29 ENCOUNTER — PROCEDURE VISIT (OUTPATIENT)
Dept: OBSTETRICS AND GYNECOLOGY | Age: 34
End: 2019-04-29

## 2019-04-29 ENCOUNTER — ROUTINE PRENATAL (OUTPATIENT)
Dept: OBSTETRICS AND GYNECOLOGY | Age: 34
End: 2019-04-29

## 2019-04-29 VITALS — WEIGHT: 225 LBS | BODY MASS INDEX: 36.32 KG/M2 | DIASTOLIC BLOOD PRESSURE: 70 MMHG | SYSTOLIC BLOOD PRESSURE: 110 MMHG

## 2019-04-29 DIAGNOSIS — O09.529 ANTEPARTUM MULTIGRAVIDA OF ADVANCED MATERNAL AGE: Primary | ICD-10-CM

## 2019-04-29 DIAGNOSIS — O09.523 ELDERLY MULTIGRAVIDA IN THIRD TRIMESTER: Primary | ICD-10-CM

## 2019-04-29 PROCEDURE — 59425 ANTEPARTUM CARE ONLY: CPT | Performed by: OBSTETRICS & GYNECOLOGY

## 2019-04-29 PROCEDURE — 76819 FETAL BIOPHYS PROFIL W/O NST: CPT | Performed by: OBSTETRICS & GYNECOLOGY

## 2019-05-02 ENCOUNTER — HOSPITAL ENCOUNTER (OUTPATIENT)
Dept: LABOR AND DELIVERY | Facility: HOSPITAL | Age: 34
Discharge: HOME | End: 2019-05-02

## 2019-05-02 ENCOUNTER — HOSPITAL ENCOUNTER (INPATIENT)
Facility: HOSPITAL | Age: 34
LOS: 2 days | Discharge: HOME | End: 2019-05-04
Attending: OBSTETRICS & GYNECOLOGY | Admitting: OBSTETRICS & GYNECOLOGY

## 2019-05-02 ENCOUNTER — ANESTHESIA (OUTPATIENT)
Dept: LABOR AND DELIVERY | Facility: HOSPITAL | Age: 34
End: 2019-05-02

## 2019-05-02 ENCOUNTER — ANESTHESIA EVENT (OUTPATIENT)
Dept: LABOR AND DELIVERY | Facility: HOSPITAL | Age: 34
End: 2019-05-02

## 2019-05-02 DIAGNOSIS — Z30.2 REQUEST FOR STERILIZATION: ICD-10-CM

## 2019-05-02 PROBLEM — Z34.90 PREGNANCY: Status: ACTIVE | Noted: 2019-05-02

## 2019-05-02 LAB
ABO GROUP BLD: NORMAL
BASOPHILS # BLD AUTO: 0.03 10*3/MM3 (ref 0–0.2)
BASOPHILS NFR BLD AUTO: 0.4 % (ref 0–1.5)
BLD GP AB SCN SERPL QL: NEGATIVE
DEPRECATED RDW RBC AUTO: 48.7 FL (ref 37–54)
EOSINOPHIL # BLD AUTO: 0.06 10*3/MM3 (ref 0–0.4)
EOSINOPHIL NFR BLD AUTO: 0.8 % (ref 0.3–6.2)
ERYTHROCYTE [DISTWIDTH] IN BLOOD BY AUTOMATED COUNT: 13.5 % (ref 12.3–15.4)
HCT VFR BLD AUTO: 34.9 % (ref 34–46.6)
HGB BLD-MCNC: 11.6 G/DL (ref 12–15.9)
IMM GRANULOCYTES # BLD AUTO: 0.05 10*3/MM3 (ref 0–0.05)
IMM GRANULOCYTES NFR BLD AUTO: 0.7 % (ref 0–0.5)
LYMPHOCYTES # BLD AUTO: 1.76 10*3/MM3 (ref 0.7–3.1)
LYMPHOCYTES NFR BLD AUTO: 24.8 % (ref 19.6–45.3)
MCH RBC QN AUTO: 32.4 PG (ref 26.6–33)
MCHC RBC AUTO-ENTMCNC: 33.2 G/DL (ref 31.5–35.7)
MCV RBC AUTO: 97.5 FL (ref 79–97)
MONOCYTES # BLD AUTO: 0.63 10*3/MM3 (ref 0.1–0.9)
MONOCYTES NFR BLD AUTO: 8.9 % (ref 5–12)
NEUTROPHILS # BLD AUTO: 4.56 10*3/MM3 (ref 1.7–7)
NEUTROPHILS NFR BLD AUTO: 64.4 % (ref 42.7–76)
NRBC BLD AUTO-RTO: 0 /100 WBC (ref 0–0.2)
PLATELET # BLD AUTO: 193 10*3/MM3 (ref 140–450)
PMV BLD AUTO: 10.4 FL (ref 6–12)
RBC # BLD AUTO: 3.58 10*6/MM3 (ref 3.77–5.28)
RH BLD: POSITIVE
T&S EXPIRATION DATE: NORMAL
WBC NRBC COR # BLD: 7.09 10*3/MM3 (ref 3.4–10.8)

## 2019-05-02 PROCEDURE — 85025 COMPLETE CBC W/AUTO DIFF WBC: CPT | Performed by: OBSTETRICS & GYNECOLOGY

## 2019-05-02 PROCEDURE — 59410 OBSTETRICAL CARE: CPT | Performed by: OBSTETRICS & GYNECOLOGY

## 2019-05-02 PROCEDURE — 25010000002 ROPIVACAINE PER 1 MG: Performed by: ANESTHESIOLOGY

## 2019-05-02 PROCEDURE — C1755 CATHETER, INTRASPINAL: HCPCS | Performed by: ANESTHESIOLOGY

## 2019-05-02 PROCEDURE — 25010000002 FENTANYL CITRATE (PF) 100 MCG/2ML SOLUTION: Performed by: NURSE ANESTHETIST, CERTIFIED REGISTERED

## 2019-05-02 PROCEDURE — 86900 BLOOD TYPING SEROLOGIC ABO: CPT | Performed by: OBSTETRICS & GYNECOLOGY

## 2019-05-02 PROCEDURE — 86850 RBC ANTIBODY SCREEN: CPT | Performed by: OBSTETRICS & GYNECOLOGY

## 2019-05-02 PROCEDURE — 86901 BLOOD TYPING SEROLOGIC RH(D): CPT | Performed by: OBSTETRICS & GYNECOLOGY

## 2019-05-02 PROCEDURE — 58605 DIVISION OF FALLOPIAN TUBE: CPT | Performed by: OBSTETRICS & GYNECOLOGY

## 2019-05-02 PROCEDURE — 0UB70ZZ EXCISION OF BILATERAL FALLOPIAN TUBES, OPEN APPROACH: ICD-10-PCS | Performed by: OBSTETRICS & GYNECOLOGY

## 2019-05-02 PROCEDURE — 6A550ZT PHERESIS OF CORD BLOOD STEM CELLS, SINGLE: ICD-10-PCS | Performed by: OBSTETRICS & GYNECOLOGY

## 2019-05-02 PROCEDURE — C1755 CATHETER, INTRASPINAL: HCPCS

## 2019-05-02 PROCEDURE — 0HQ9XZZ REPAIR PERINEUM SKIN, EXTERNAL APPROACH: ICD-10-PCS | Performed by: OBSTETRICS & GYNECOLOGY

## 2019-05-02 PROCEDURE — 88302 TISSUE EXAM BY PATHOLOGIST: CPT | Performed by: OBSTETRICS & GYNECOLOGY

## 2019-05-02 PROCEDURE — S0260 H&P FOR SURGERY: HCPCS | Performed by: OBSTETRICS & GYNECOLOGY

## 2019-05-02 PROCEDURE — 3E033VJ INTRODUCTION OF OTHER HORMONE INTO PERIPHERAL VEIN, PERCUTANEOUS APPROACH: ICD-10-PCS | Performed by: OBSTETRICS & GYNECOLOGY

## 2019-05-02 PROCEDURE — 25010000002 MIDAZOLAM PER 1 MG: Performed by: NURSE ANESTHETIST, CERTIFIED REGISTERED

## 2019-05-02 RX ORDER — SODIUM CHLORIDE 0.9 % (FLUSH) 0.9 %
3 SYRINGE (ML) INJECTION EVERY 12 HOURS SCHEDULED
Status: DISCONTINUED | OUTPATIENT
Start: 2019-05-02 | End: 2019-05-02 | Stop reason: HOSPADM

## 2019-05-02 RX ORDER — DIPHENHYDRAMINE HCL 25 MG
25 CAPSULE ORAL NIGHTLY PRN
Status: DISCONTINUED | OUTPATIENT
Start: 2019-05-02 | End: 2019-05-04 | Stop reason: HOSPADM

## 2019-05-02 RX ORDER — MIDAZOLAM HYDROCHLORIDE 1 MG/ML
INJECTION INTRAMUSCULAR; INTRAVENOUS
Status: COMPLETED
Start: 2019-05-02 | End: 2019-05-02

## 2019-05-02 RX ORDER — ACETAMINOPHEN 325 MG/1
650 TABLET ORAL EVERY 4 HOURS PRN
Status: DISCONTINUED | OUTPATIENT
Start: 2019-05-02 | End: 2019-05-02 | Stop reason: HOSPADM

## 2019-05-02 RX ORDER — LIDOCAINE HYDROCHLORIDE AND EPINEPHRINE 20; 5 MG/ML; UG/ML
INJECTION, SOLUTION EPIDURAL; INFILTRATION; INTRACAUDAL; PERINEURAL AS NEEDED
Status: DISCONTINUED | OUTPATIENT
Start: 2019-05-02 | End: 2019-05-02 | Stop reason: SURG

## 2019-05-02 RX ORDER — HYDROMORPHONE HYDROCHLORIDE 1 MG/ML
0.5 INJECTION, SOLUTION INTRAMUSCULAR; INTRAVENOUS; SUBCUTANEOUS
Status: DISCONTINUED | OUTPATIENT
Start: 2019-05-02 | End: 2019-05-04 | Stop reason: HOSPADM

## 2019-05-02 RX ORDER — IBUPROFEN 800 MG/1
800 TABLET ORAL EVERY 8 HOURS SCHEDULED
Status: DISCONTINUED | OUTPATIENT
Start: 2019-05-02 | End: 2019-05-04 | Stop reason: HOSPADM

## 2019-05-02 RX ORDER — ONDANSETRON 4 MG/1
4 TABLET, FILM COATED ORAL EVERY 6 HOURS PRN
Status: DISCONTINUED | OUTPATIENT
Start: 2019-05-02 | End: 2019-05-04 | Stop reason: HOSPADM

## 2019-05-02 RX ORDER — FENTANYL CITRATE 50 UG/ML
INJECTION, SOLUTION INTRAMUSCULAR; INTRAVENOUS AS NEEDED
Status: DISCONTINUED | OUTPATIENT
Start: 2019-05-02 | End: 2019-05-02 | Stop reason: SURG

## 2019-05-02 RX ORDER — MORPHINE SULFATE 2 MG/ML
2 INJECTION, SOLUTION INTRAMUSCULAR; INTRAVENOUS
Status: DISCONTINUED | OUTPATIENT
Start: 2019-05-02 | End: 2019-05-02 | Stop reason: HOSPADM

## 2019-05-02 RX ORDER — ONDANSETRON 2 MG/ML
4 INJECTION INTRAMUSCULAR; INTRAVENOUS EVERY 6 HOURS PRN
Status: DISCONTINUED | OUTPATIENT
Start: 2019-05-02 | End: 2019-05-02 | Stop reason: HOSPADM

## 2019-05-02 RX ORDER — MISOPROSTOL 200 UG/1
800 TABLET ORAL AS NEEDED
Status: DISCONTINUED | OUTPATIENT
Start: 2019-05-02 | End: 2019-05-02 | Stop reason: HOSPADM

## 2019-05-02 RX ORDER — PROMETHAZINE HYDROCHLORIDE 12.5 MG/1
12.5 SUPPOSITORY RECTAL EVERY 6 HOURS PRN
Status: DISCONTINUED | OUTPATIENT
Start: 2019-05-02 | End: 2019-05-02 | Stop reason: HOSPADM

## 2019-05-02 RX ORDER — OXYTOCIN-SODIUM CHLORIDE 0.9% IV SOLN 30 UNIT/500ML 30-0.9/5 UT/ML-%
125 SOLUTION INTRAVENOUS CONTINUOUS PRN
Status: COMPLETED | OUTPATIENT
Start: 2019-05-02 | End: 2019-05-02

## 2019-05-02 RX ORDER — SODIUM CHLORIDE, SODIUM LACTATE, POTASSIUM CHLORIDE, CALCIUM CHLORIDE 600; 310; 30; 20 MG/100ML; MG/100ML; MG/100ML; MG/100ML
125 INJECTION, SOLUTION INTRAVENOUS CONTINUOUS
Status: DISCONTINUED | OUTPATIENT
Start: 2019-05-02 | End: 2019-05-02

## 2019-05-02 RX ORDER — MIDAZOLAM HYDROCHLORIDE 1 MG/ML
INJECTION INTRAMUSCULAR; INTRAVENOUS AS NEEDED
Status: DISCONTINUED | OUTPATIENT
Start: 2019-05-02 | End: 2019-05-02 | Stop reason: SURG

## 2019-05-02 RX ORDER — METHYLERGONOVINE MALEATE 0.2 MG/ML
200 INJECTION INTRAVENOUS ONCE AS NEEDED
Status: DISCONTINUED | OUTPATIENT
Start: 2019-05-02 | End: 2019-05-02 | Stop reason: HOSPADM

## 2019-05-02 RX ORDER — OXYCODONE HYDROCHLORIDE AND ACETAMINOPHEN 5; 325 MG/1; MG/1
2 TABLET ORAL EVERY 4 HOURS PRN
Status: DISCONTINUED | OUTPATIENT
Start: 2019-05-02 | End: 2019-05-02 | Stop reason: HOSPADM

## 2019-05-02 RX ORDER — SODIUM CHLORIDE, SODIUM LACTATE, POTASSIUM CHLORIDE, CALCIUM CHLORIDE 600; 310; 30; 20 MG/100ML; MG/100ML; MG/100ML; MG/100ML
125 INJECTION, SOLUTION INTRAVENOUS CONTINUOUS
Status: DISCONTINUED | OUTPATIENT
Start: 2019-05-02 | End: 2019-05-04 | Stop reason: HOSPADM

## 2019-05-02 RX ORDER — IBUPROFEN 600 MG/1
600 TABLET ORAL EVERY 6 HOURS PRN
Status: DISCONTINUED | OUTPATIENT
Start: 2019-05-02 | End: 2019-05-02 | Stop reason: HOSPADM

## 2019-05-02 RX ORDER — OXYCODONE HYDROCHLORIDE AND ACETAMINOPHEN 5; 325 MG/1; MG/1
1 TABLET ORAL EVERY 4 HOURS PRN
Status: DISCONTINUED | OUTPATIENT
Start: 2019-05-02 | End: 2019-05-04 | Stop reason: HOSPADM

## 2019-05-02 RX ORDER — TERBUTALINE SULFATE 1 MG/ML
0.25 INJECTION, SOLUTION SUBCUTANEOUS AS NEEDED
Status: DISCONTINUED | OUTPATIENT
Start: 2019-05-02 | End: 2019-05-02 | Stop reason: HOSPADM

## 2019-05-02 RX ORDER — OXYTOCIN-SODIUM CHLORIDE 0.9% IV SOLN 30 UNIT/500ML 30-0.9/5 UT/ML-%
2-30 SOLUTION INTRAVENOUS
Status: DISCONTINUED | OUTPATIENT
Start: 2019-05-02 | End: 2019-05-02 | Stop reason: HOSPADM

## 2019-05-02 RX ORDER — LANOLIN
CREAM (ML) TOPICAL AS NEEDED
Status: DISCONTINUED | OUTPATIENT
Start: 2019-05-02 | End: 2019-05-04 | Stop reason: HOSPADM

## 2019-05-02 RX ORDER — EPHEDRINE SULFATE 50 MG/ML
5 INJECTION, SOLUTION INTRAVENOUS
Status: DISCONTINUED | OUTPATIENT
Start: 2019-05-02 | End: 2019-05-02 | Stop reason: HOSPADM

## 2019-05-02 RX ORDER — FENTANYL CITRATE 50 UG/ML
INJECTION, SOLUTION INTRAMUSCULAR; INTRAVENOUS
Status: COMPLETED
Start: 2019-05-02 | End: 2019-05-02

## 2019-05-02 RX ORDER — PROMETHAZINE HYDROCHLORIDE 25 MG/1
12.5 TABLET ORAL EVERY 6 HOURS PRN
Status: DISCONTINUED | OUTPATIENT
Start: 2019-05-02 | End: 2019-05-02 | Stop reason: HOSPADM

## 2019-05-02 RX ORDER — NALOXONE HCL 0.4 MG/ML
0.4 VIAL (ML) INJECTION
Status: DISCONTINUED | OUTPATIENT
Start: 2019-05-02 | End: 2019-05-04 | Stop reason: HOSPADM

## 2019-05-02 RX ORDER — ONDANSETRON 4 MG/1
4 TABLET, FILM COATED ORAL EVERY 6 HOURS PRN
Status: DISCONTINUED | OUTPATIENT
Start: 2019-05-02 | End: 2019-05-02 | Stop reason: HOSPADM

## 2019-05-02 RX ORDER — CARBOPROST TROMETHAMINE 250 UG/ML
250 INJECTION, SOLUTION INTRAMUSCULAR AS NEEDED
Status: DISCONTINUED | OUTPATIENT
Start: 2019-05-02 | End: 2019-05-02 | Stop reason: HOSPADM

## 2019-05-02 RX ORDER — ROPIVACAINE HYDROCHLORIDE 2 MG/ML
10 INJECTION, SOLUTION EPIDURAL; INFILTRATION; PERINEURAL CONTINUOUS
Status: DISCONTINUED | OUTPATIENT
Start: 2019-05-02 | End: 2019-05-02

## 2019-05-02 RX ORDER — LIDOCAINE HYDROCHLORIDE AND EPINEPHRINE 15; 5 MG/ML; UG/ML
INJECTION, SOLUTION EPIDURAL AS NEEDED
Status: DISCONTINUED | OUTPATIENT
Start: 2019-05-02 | End: 2019-05-02 | Stop reason: SURG

## 2019-05-02 RX ORDER — METOCLOPRAMIDE 10 MG/1
10 TABLET ORAL ONCE
Status: COMPLETED | OUTPATIENT
Start: 2019-05-02 | End: 2019-05-02

## 2019-05-02 RX ORDER — PROMETHAZINE HYDROCHLORIDE 25 MG/ML
12.5 INJECTION, SOLUTION INTRAMUSCULAR; INTRAVENOUS EVERY 6 HOURS PRN
Status: DISCONTINUED | OUTPATIENT
Start: 2019-05-02 | End: 2019-05-02 | Stop reason: HOSPADM

## 2019-05-02 RX ORDER — HYDROMORPHONE HYDROCHLORIDE 1 MG/ML
1 INJECTION, SOLUTION INTRAMUSCULAR; INTRAVENOUS; SUBCUTANEOUS EVERY 4 HOURS PRN
Status: DISCONTINUED | OUTPATIENT
Start: 2019-05-02 | End: 2019-05-04 | Stop reason: HOSPADM

## 2019-05-02 RX ORDER — BISACODYL 10 MG
10 SUPPOSITORY, RECTAL RECTAL DAILY PRN
Status: DISCONTINUED | OUTPATIENT
Start: 2019-05-03 | End: 2019-05-04 | Stop reason: HOSPADM

## 2019-05-02 RX ORDER — ERYTHROMYCIN 5 MG/G
OINTMENT OPHTHALMIC
Status: DISPENSED
Start: 2019-05-02 | End: 2019-05-03

## 2019-05-02 RX ORDER — PHYTONADIONE 1 MG/.5ML
INJECTION, EMULSION INTRAMUSCULAR; INTRAVENOUS; SUBCUTANEOUS
Status: DISPENSED
Start: 2019-05-02 | End: 2019-05-03

## 2019-05-02 RX ORDER — OXYTOCIN-SODIUM CHLORIDE 0.9% IV SOLN 30 UNIT/500ML 30-0.9/5 UT/ML-%
999 SOLUTION INTRAVENOUS ONCE
Status: COMPLETED | OUTPATIENT
Start: 2019-05-02 | End: 2019-05-02

## 2019-05-02 RX ORDER — MORPHINE SULFATE 2 MG/ML
1 INJECTION, SOLUTION INTRAMUSCULAR; INTRAVENOUS EVERY 4 HOURS PRN
Status: DISCONTINUED | OUTPATIENT
Start: 2019-05-02 | End: 2019-05-04 | Stop reason: HOSPADM

## 2019-05-02 RX ORDER — OXYTOCIN-SODIUM CHLORIDE 0.9% IV SOLN 30 UNIT/500ML 30-0.9/5 UT/ML-%
250 SOLUTION INTRAVENOUS CONTINUOUS
Status: DISPENSED | OUTPATIENT
Start: 2019-05-02 | End: 2019-05-02

## 2019-05-02 RX ORDER — LIDOCAINE HYDROCHLORIDE 10 MG/ML
5 INJECTION, SOLUTION EPIDURAL; INFILTRATION; INTRACAUDAL; PERINEURAL AS NEEDED
Status: DISCONTINUED | OUTPATIENT
Start: 2019-05-02 | End: 2019-05-02 | Stop reason: HOSPADM

## 2019-05-02 RX ORDER — MISOPROSTOL 200 UG/1
800 TABLET ORAL AS NEEDED
Status: DISCONTINUED | OUTPATIENT
Start: 2019-05-02 | End: 2019-05-04 | Stop reason: HOSPADM

## 2019-05-02 RX ORDER — NALBUPHINE HCL 10 MG/ML
5 AMPUL (ML) INJECTION
Status: DISCONTINUED | OUTPATIENT
Start: 2019-05-02 | End: 2019-05-02 | Stop reason: HOSPADM

## 2019-05-02 RX ORDER — DOCUSATE SODIUM 100 MG/1
100 CAPSULE, LIQUID FILLED ORAL 2 TIMES DAILY PRN
Status: DISCONTINUED | OUTPATIENT
Start: 2019-05-02 | End: 2019-05-04 | Stop reason: HOSPADM

## 2019-05-02 RX ORDER — SODIUM CHLORIDE 0.9 % (FLUSH) 0.9 %
3-10 SYRINGE (ML) INJECTION AS NEEDED
Status: DISCONTINUED | OUTPATIENT
Start: 2019-05-02 | End: 2019-05-02 | Stop reason: HOSPADM

## 2019-05-02 RX ORDER — OXYCODONE AND ACETAMINOPHEN 7.5; 325 MG/1; MG/1
1 TABLET ORAL EVERY 4 HOURS PRN
Status: DISCONTINUED | OUTPATIENT
Start: 2019-05-02 | End: 2019-05-04 | Stop reason: HOSPADM

## 2019-05-02 RX ORDER — PROMETHAZINE HYDROCHLORIDE 25 MG/ML
12.5 INJECTION, SOLUTION INTRAMUSCULAR; INTRAVENOUS EVERY 4 HOURS PRN
Status: DISCONTINUED | OUTPATIENT
Start: 2019-05-02 | End: 2019-05-04 | Stop reason: HOSPADM

## 2019-05-02 RX ORDER — ONDANSETRON 2 MG/ML
4 INJECTION INTRAMUSCULAR; INTRAVENOUS EVERY 6 HOURS PRN
Status: DISCONTINUED | OUTPATIENT
Start: 2019-05-02 | End: 2019-05-04 | Stop reason: HOSPADM

## 2019-05-02 RX ORDER — ACETAMINOPHEN 325 MG/1
650 TABLET ORAL EVERY 4 HOURS PRN
Status: DISCONTINUED | OUTPATIENT
Start: 2019-05-02 | End: 2019-05-04 | Stop reason: HOSPADM

## 2019-05-02 RX ORDER — NALOXONE HCL 0.4 MG/ML
0.1 VIAL (ML) INJECTION
Status: DISCONTINUED | OUTPATIENT
Start: 2019-05-02 | End: 2019-05-04 | Stop reason: HOSPADM

## 2019-05-02 RX ORDER — PROMETHAZINE HYDROCHLORIDE 12.5 MG/1
12.5 TABLET ORAL EVERY 4 HOURS PRN
Status: DISCONTINUED | OUTPATIENT
Start: 2019-05-02 | End: 2019-05-04 | Stop reason: HOSPADM

## 2019-05-02 RX ORDER — SODIUM CHLORIDE 0.9 % (FLUSH) 0.9 %
1-10 SYRINGE (ML) INJECTION AS NEEDED
Status: DISCONTINUED | OUTPATIENT
Start: 2019-05-02 | End: 2019-05-04 | Stop reason: HOSPADM

## 2019-05-02 RX ADMIN — IBUPROFEN 800 MG: 800 TABLET, FILM COATED ORAL at 18:57

## 2019-05-02 RX ADMIN — OXYTOCIN 125 ML/HR: 10 INJECTION INTRAVENOUS at 16:43

## 2019-05-02 RX ADMIN — SODIUM CHLORIDE, POTASSIUM CHLORIDE, SODIUM LACTATE AND CALCIUM CHLORIDE 125 ML/HR: 600; 310; 30; 20 INJECTION, SOLUTION INTRAVENOUS at 11:00

## 2019-05-02 RX ADMIN — Medication: at 18:58

## 2019-05-02 RX ADMIN — SODIUM CHLORIDE, POTASSIUM CHLORIDE, SODIUM LACTATE AND CALCIUM CHLORIDE 125 ML/HR: 600; 310; 30; 20 INJECTION, SOLUTION INTRAVENOUS at 08:31

## 2019-05-02 RX ADMIN — METOCLOPRAMIDE 10 MG: 10 TABLET ORAL at 19:58

## 2019-05-02 RX ADMIN — FENTANYL CITRATE 25 MCG: 50 INJECTION INTRAMUSCULAR; INTRAVENOUS at 16:01

## 2019-05-02 RX ADMIN — SODIUM CHLORIDE, POTASSIUM CHLORIDE, SODIUM LACTATE AND CALCIUM CHLORIDE 1000 ML: 600; 310; 30; 20 INJECTION, SOLUTION INTRAVENOUS at 10:35

## 2019-05-02 RX ADMIN — EPHEDRINE SULFATE 5 MG: 50 INJECTION, SOLUTION INTRAVENOUS at 11:58

## 2019-05-02 RX ADMIN — LIDOCAINE HYDROCHLORIDE,EPINEPHRINE BITARTRATE 4 ML: 20; .005 INJECTION, SOLUTION EPIDURAL; INFILTRATION; INTRACAUDAL; PERINEURAL at 15:46

## 2019-05-02 RX ADMIN — OXYTOCIN 2 MILLI-UNITS/MIN: 10 INJECTION INTRAVENOUS at 09:01

## 2019-05-02 RX ADMIN — OXYTOCIN 999 ML/HR: 10 INJECTION INTRAVENOUS at 15:28

## 2019-05-02 RX ADMIN — ROPIVACAINE HYDROCHLORIDE 10 ML: 2 INJECTION, SOLUTION EPIDURAL; INFILTRATION; PERINEURAL at 10:49

## 2019-05-02 RX ADMIN — LIDOCAINE HYDROCHLORIDE,EPINEPHRINE BITARTRATE 4 ML: 20; .005 INJECTION, SOLUTION EPIDURAL; INFILTRATION; INTRACAUDAL; PERINEURAL at 15:43

## 2019-05-02 RX ADMIN — SODIUM CHLORIDE, POTASSIUM CHLORIDE, SODIUM LACTATE AND CALCIUM CHLORIDE 125 ML/HR: 600; 310; 30; 20 INJECTION, SOLUTION INTRAVENOUS at 14:13

## 2019-05-02 RX ADMIN — LIDOCAINE HYDROCHLORIDE,EPINEPHRINE BITARTRATE 4 ML: 20; .005 INJECTION, SOLUTION EPIDURAL; INFILTRATION; INTRACAUDAL; PERINEURAL at 15:39

## 2019-05-02 RX ADMIN — LIDOCAINE HYDROCHLORIDE AND EPINEPHRINE 3 ML: 15; 5 INJECTION, SOLUTION EPIDURAL at 10:44

## 2019-05-02 RX ADMIN — ACETAMINOPHEN 650 MG: 325 TABLET, FILM COATED ORAL at 20:36

## 2019-05-02 RX ADMIN — MIDAZOLAM 2 MG: 1 INJECTION INTRAMUSCULAR; INTRAVENOUS at 16:01

## 2019-05-02 NOTE — ANESTHESIA PROCEDURE NOTES
Labor Epidural      Patient location during procedure: OB  Performed By  Anesthesiologist: Claude Villagomez MD  Preanesthetic Checklist  Completed: patient identified, site marked, surgical consent, pre-op evaluation, timeout performed, IV checked, risks and benefits discussed and monitors and equipment checked  Prep:  Pt Position:sitting  Sterile Tech:gloves  Prep:chlorhexidine gluconate and isopropyl alcohol  Monitoring:blood pressure monitoring, continuous pulse oximetry and EKG  Epidural Block Procedure:  Approach:midline  Guidance:palpation technique  Location:L3-L4  Needle Type:Tuohy  Needle Gauge:17 G  Loss of Resistance Medium: saline  Cath Depth at skin:8 cm  Paresthesia: none  Aspiration:negative  Test Dose:negative  Number of Attempts: 1  Post Assessment:  Dressing:occlusive dressing applied and secured with tape  Pt Tolerance:patient tolerated the procedure well with no apparent complications  Complications:no

## 2019-05-02 NOTE — OP NOTE
Postpartum Tubal Ligation    May 2, 2019    Pre Op Dx:   1. 33 y.o.   2. Desires permanent sterilization   Post Op Dx: Same   Procedure: Postpartum tubal ligation via mini-laparotomy  Surgeons: link   Anesthesia: Spinal  Complications: none  EBL: <5mL    Findings: Normal uterine tubes bilaterally.   Specimen: 2cm segment of right and left fallopian tube   Indications: This is a 33 y.o. yo  who is  s/p  and desires permanent sterilization. The risks, benefits and alternatives of sterilization were discussed with the patient, including but not limited to infection, allergic reaction, scar, risk of blood loss requiring blood transfusion with risk of exposure to HIV or hepatitis, risk of herniation at incision site, failure to become sterile, ectopic pregnancy, injury to bowel, bladder, or blood vessels and risk of need to convert to another type of sterilization.    Procedure:   The patient was taken to the operating room where spinal anesthesia was placed and found to be adequate. She was prepped and draped in the normal sterile fashion and placed in the supine position. A time out was taken. The skin was then doubly clamed with Allis clamps, elevated and an infraumbilical incision was made with the scalpel and carried down through the subcutaneous tissue. S-retractors were used to bluntly dissect through the subcutaneous tissue until the rectus fascia was identified. The fascia was grasped with Kocher clamps, elevated and entered sharply with the scalpel. The fascial corners were tagged with 0-vicryl and the Kocher clamps removed. Blunt dissection was used to identify the peritoneum which was entered bluntly. Thumb retractors were used for exposure and a moist Ej laparotomy sponge was used to pack the bowel/omentum out of the way. The cornua was identified the fallopian tube was grasped with a Jeff clamp and elevated through incision and followed out to the fimbriated end in a stepwise fashion.  The isthmic portion of the tube was regrasped with the Jeff clamp and a loop of that portion of tube was tied with 0 plain gut.  A window was created in a nonvascular portion of the mesosalpinx with the Bovie above the tie.  2 silk ties on passers were placed through this opening and tied on either side then tagged.  The loop of tubal segment was excised with Metzenbaum scissors.  The exposed segments of tube were bovied.  The area was noted to be hemostatic. Attention was then turned to the contralateral side and the procedure was carried out in the same fashion. A segment of each tube was excised. Both sites were once again inspected and noted to be hemostatic.   The laparotomy sponge(s) were removed from the patient's abdomen. The fascia was closed with 0-vicryl on a UR6. The subcutaneous tissue was re approximated using 3-0 vicryl and the skin was closed with 4-0 vicryl in a subcuticular fashion.   The patient tolerated the procedure well. All counts were correct. The patient was taken to recovery room in stable condition.    Juan Ramon Dias MD       EMR Dragon/ Transcription disclaimer:  Much of the encounter note is an electronic transcription/translation of spoken language to printed text. The electronic translation of spoken language may permit erroneous, or at times, nonessential words or phrases to be inadvertently transcribes; Although i have reviewed the note for such errors, some may still exist.

## 2019-05-02 NOTE — NURSING NOTE
RN at bedside palpating for ctx, adjusting TOCO. Ctx palp moderate q3-4 minutes. Multiple RNs adjusting TOCO. Pt repositioned at 1200, TOCO adjusted.    Caitie Tavarez RN

## 2019-05-02 NOTE — PLAN OF CARE
Problem: Patient Care Overview  Goal: Plan of Care Review  Outcome: Ongoing (interventions implemented as appropriate)   05/02/19 1836   Coping/Psychosocial   Plan of Care Reviewed With patient;spouse   Plan of Care Review   Progress improving     Goal: Individualization and Mutuality   05/02/19 0850   Individualization   Patient Specific Preferences Breastfeeding, tubal, ESTHER, epidural   Patient Specific Goals (Include Timeframe) pain control, breastfeeding, healthy baby, vaginal delivery   Patient Specific Interventions epidural, support breastfeeding, ESTHER   Mutuality/Individual Preferences   What Anxieties, Fears, Concerns, or Questions Do You Have About Your Care? not at this time   What Information Would Help Us Give You More Personalized Care? n/a   How Would You and/or Your Support Person Like to Participate in Your Care? FOB present at delivery and not cutting the cord   Mutuality/Individual Preferences   How to Address Anxieties/Fears n/a     Goal: Discharge Needs Assessment  Outcome: Ongoing (interventions implemented as appropriate)   05/02/19 1836   Discharge Needs Assessment   Concerns to be Addressed no discharge needs identified   Patient/Family Anticipates Transition to home   Patient/Family Anticipated Services at Transition none   Transportation Concerns car, none     Goal: Interprofessional Rounds/Family Conf  Outcome: Ongoing (interventions implemented as appropriate)   05/02/19 1836   Interdisciplinary Rounds/Family Conf   Participants nursing;physician;patient       Problem: Labor (Cervical Ripen, Induct, Augment) (Adult,Obstetrics,Pediatric)  Goal: Signs and Symptoms of Listed Potential Problems Will be Absent, Minimized or Managed (Labor)  Outcome: Outcome(s) achieved Date Met: 05/02/19 05/02/19 1836   Goal/Outcome Evaluation   Problems Assessed (Labor) all   Problems Present (Labor) none       Problem: Anesthesia/Analgesia, Neuraxial (Obstetrics)  Goal: Signs and Symptoms of Listed Potential  Problems Will be Absent, Minimized or Managed (Anesthesia/Analgesia, Neuraxial)  Outcome: Ongoing (interventions implemented as appropriate)   19   Goal/Outcome Evaluation   Problems Assessed (Neuraxial Anesthesia/Analgesia, OB) all   Problems Present (Neuraxial Anesth OB) none       Problem: Fall Risk,  (Adult,Obstetrics,Pediatric)  Goal: Identify Related Risk Factors and Signs and Symptoms  Outcome: Ongoing (interventions implemented as appropriate)   19   Fall Risk,  (Adult,Obstetrics,Pediatric)   Related Risk Factors (Fall Risk, ) regional anesthesia     Goal: Absence of Maternal Fall  Outcome: Ongoing (interventions implemented as appropriate)   19   Fall Risk,  (Adult,Obstetrics,Pediatric)   Absence of Maternal Fall achieves outcome     Goal: Absence of  Fall/Drop  Outcome: Ongoing (interventions implemented as appropriate)      Problem: Skin Injury Risk (Adult)  Goal: Identify Related Risk Factors and Signs and Symptoms  Outcome: Ongoing (interventions implemented as appropriate)   19   Skin Injury Risk (Adult)   Related Risk Factors (Skin Injury Risk) mobility impaired     Goal: Skin Health and Integrity  Outcome: Ongoing (interventions implemented as appropriate)   19   Skin Injury Risk (Adult)   Skin Health and Integrity achieves outcome

## 2019-05-02 NOTE — PLAN OF CARE
Problem: Patient Care Overview  Goal: Individualization and Mutuality   05/02/19 0850   Individualization   Patient Specific Preferences Breastfeeding, tubal, ESTHER, epidural   Patient Specific Goals (Include Timeframe) pain control, breastfeeding, healthy baby, vaginal delivery   Patient Specific Interventions epidural, support breastfeeding, ESTHER   Mutuality/Individual Preferences   What Anxieties, Fears, Concerns, or Questions Do You Have About Your Care? not at this time   What Information Would Help Us Give You More Personalized Care? n/a   How Would You and/or Your Support Person Like to Participate in Your Care? FOB present at delivery and not cutting the cord   Mutuality/Individual Preferences   How to Address Anxieties/Fears n/a

## 2019-05-02 NOTE — ANESTHESIA PREPROCEDURE EVALUATION
Anesthesia Evaluation     Patient summary reviewed                Airway   No difficulty expected  Dental      Pulmonary    Cardiovascular     Rhythm: regular        Neuro/Psych  GI/Hepatic/Renal/Endo      Musculoskeletal     Abdominal    Substance History      OB/GYN          Other                        Anesthesia Plan    ASA 2     epidural     Anesthetic plan, all risks, benefits, and alternatives have been provided, discussed and informed consent has been obtained with: patient.

## 2019-05-02 NOTE — H&P
History and physical    Admission date 2019     Patient: Stephanie Cobb MRN: [id number]   YOB: 1985 Age: 33 y.o. Sex: female     Chief Complaint   Patient presents with   • Scheduled Induction       HPI:    Stephanie Cobb is a 33 y.o.,  AT 39w2d admitted for induction of labor patient with a history of advanced maternal age.. Denies vaginal bleeding, leakage of fluid, or contractions. Admits to good fetal movement.     OB History    Para Term  AB Living   4 3 3     3   SAB TAB Ectopic Molar Multiple Live Births           0 3      # Outcome Date GA Lbr Haseeb/2nd Weight Sex Delivery Anes PTL Lv   4 Current            3 Term 16 39w3d 04:05 :49 3643 g (8 lb 0.5 oz) F Vag-Spont EPI N BOOGIE   2 Term 09/10/14 38w3d   M Vag-Spont EPI N BOOGIE   1 Term 10/11/12 40w0d   M Vag-Spont EPI N BOOGIE        Past Medical History:   Diagnosis Date   • Hx of blood clots     Rt.leg,superficial   • Urinary tract infection      Past Surgical History:   Procedure Laterality Date   • CYSTECTOMY      Left   • DENTAL PROCEDURE Left    • DIAGNOSTIC LAPAROSCOPY Left 2018    Procedure: DIAGNOSTIC LAPAROSCOPY/LT.OVARIAN CYSTECTOMY;  Surgeon: Juan Ramon Dais MD;  Location: [facility];  Service: Obstetrics/Gynecology   • WISDOM TOOTH EXTRACTION       No current facility-administered medications on file prior to encounter.      Current Outpatient Medications on File Prior to Encounter   Medication Sig Dispense Refill   • Prenatal Vit-Fe Fumarate-FA (PRENATAL, CLASSIC, VITAMIN) 28-0.8 MG tablet tablet Take 1 tablet by mouth Daily.     • rizatriptan (MAXALT) 5 MG tablet Take 5-10 mg by mouth.         ROS:      Except as outlined in history of physical illness, patient denies any changes in her GYN, , GI systems. All other systems reviewed are negative.      OBJECTIVE:     Vitals:   Vitals:    19 0915 19 0945 19 1015 19 1100   BP: 114/81 118/82 124/87 113/58   Pulse: 81 86 74 96    Resp:       Temp:       TempSrc:       SpO2:             Appearance/Psychiatric: In no distress   Constitutional: The patient is well nourished   Cardiovascular: She does not have edema. Heart RRR  Respiratory: Respiratory effort is normal. CTAB   Abdomen: Soft, gravid.  Ext: nontender, no edema. +2/4 bilateral patellar reflexes   Cx; 4 cm 70 to 80% -2 vertex artificial rupture membranes reveals clear fluid vertex is well applied      Patient Active Problem List    Diagnosis   • Pregnancy [Z34.90]   • Antepartum multigravida of advanced maternal age [O09.529]       LOS: 0 days    Juan Ramon Dias MD   May 2, 2019    Assessment and Plan: Term intrauterine pregnancy admitted for medical induction of labor history of advanced maternal age.  Risk benefits potential complications reviewed.  AROM performed with clear fluid.  Epidural has been placed and patient is comfortable.

## 2019-05-02 NOTE — PROGRESS NOTES
Patient still resting comfortably.  Fetal heart tones were reactive, cervix 5 cm 70% -2 clear fluid.  Need to increase Pitocin.

## 2019-05-03 LAB
BASOPHILS # BLD AUTO: 0.02 10*3/MM3 (ref 0–0.2)
BASOPHILS NFR BLD AUTO: 0.3 % (ref 0–1.5)
DEPRECATED RDW RBC AUTO: 48.2 FL (ref 37–54)
EOSINOPHIL # BLD AUTO: 0.09 10*3/MM3 (ref 0–0.4)
EOSINOPHIL NFR BLD AUTO: 1.2 % (ref 0.3–6.2)
ERYTHROCYTE [DISTWIDTH] IN BLOOD BY AUTOMATED COUNT: 13.4 % (ref 12.3–15.4)
HCT VFR BLD AUTO: 32.2 % (ref 34–46.6)
HGB BLD-MCNC: 10.7 G/DL (ref 12–15.9)
IMM GRANULOCYTES # BLD AUTO: 0.04 10*3/MM3 (ref 0–0.05)
IMM GRANULOCYTES NFR BLD AUTO: 0.5 % (ref 0–0.5)
LYMPHOCYTES # BLD AUTO: 1.79 10*3/MM3 (ref 0.7–3.1)
LYMPHOCYTES NFR BLD AUTO: 24.3 % (ref 19.6–45.3)
MCH RBC QN AUTO: 32.6 PG (ref 26.6–33)
MCHC RBC AUTO-ENTMCNC: 33.2 G/DL (ref 31.5–35.7)
MCV RBC AUTO: 98.2 FL (ref 79–97)
MONOCYTES # BLD AUTO: 0.59 10*3/MM3 (ref 0.1–0.9)
MONOCYTES NFR BLD AUTO: 8 % (ref 5–12)
NEUTROPHILS # BLD AUTO: 4.85 10*3/MM3 (ref 1.7–7)
NEUTROPHILS NFR BLD AUTO: 65.7 % (ref 42.7–76)
NRBC BLD AUTO-RTO: 0 /100 WBC (ref 0–0.2)
PLATELET # BLD AUTO: 181 10*3/MM3 (ref 140–450)
PMV BLD AUTO: 10.1 FL (ref 6–12)
RBC # BLD AUTO: 3.28 10*6/MM3 (ref 3.77–5.28)
WBC NRBC COR # BLD: 7.38 10*3/MM3 (ref 3.4–10.8)

## 2019-05-03 PROCEDURE — 85025 COMPLETE CBC W/AUTO DIFF WBC: CPT | Performed by: OBSTETRICS & GYNECOLOGY

## 2019-05-03 RX ORDER — METHYLERGONOVINE MALEATE 0.2 MG/1
200 TABLET ORAL EVERY 6 HOURS SCHEDULED
Status: DISCONTINUED | OUTPATIENT
Start: 2019-05-03 | End: 2019-05-04 | Stop reason: HOSPADM

## 2019-05-03 RX ADMIN — Medication: at 11:32

## 2019-05-03 RX ADMIN — ACETAMINOPHEN 650 MG: 325 TABLET, FILM COATED ORAL at 22:04

## 2019-05-03 RX ADMIN — ACETAMINOPHEN 650 MG: 325 TABLET, FILM COATED ORAL at 04:30

## 2019-05-03 RX ADMIN — METHYLERGONOVINE MALEATE 200 MCG: 0.2 TABLET ORAL at 19:46

## 2019-05-03 RX ADMIN — ACETAMINOPHEN 650 MG: 325 TABLET, FILM COATED ORAL at 00:31

## 2019-05-03 RX ADMIN — IBUPROFEN 800 MG: 800 TABLET, FILM COATED ORAL at 11:32

## 2019-05-03 RX ADMIN — ACETAMINOPHEN 650 MG: 325 TABLET, FILM COATED ORAL at 15:55

## 2019-05-03 RX ADMIN — IBUPROFEN 800 MG: 800 TABLET, FILM COATED ORAL at 19:47

## 2019-05-03 RX ADMIN — METHYLERGONOVINE MALEATE 200 MCG: 0.2 TABLET ORAL at 13:19

## 2019-05-03 RX ADMIN — IBUPROFEN 800 MG: 800 TABLET, FILM COATED ORAL at 03:04

## 2019-05-03 RX ADMIN — ACETAMINOPHEN 650 MG: 325 TABLET, FILM COATED ORAL at 11:34

## 2019-05-03 NOTE — PLAN OF CARE
Problem: Patient Care Overview  Goal: Plan of Care Review  Outcome: Ongoing (interventions implemented as appropriate)   05/03/19 4441   Coping/Psychosocial   Plan of Care Reviewed With patient   Plan of Care Review   Progress improving   OTHER   Outcome Summary ambulates in the room, pain under control     Goal: Discharge Needs Assessment  Outcome: Ongoing (interventions implemented as appropriate)      Problem: Postpartum (Vaginal Delivery) (Adult,Obstetrics,Pediatric)  Goal: Signs and Symptoms of Listed Potential Problems Will be Absent, Minimized or Managed (Postpartum)  Outcome: Ongoing (interventions implemented as appropriate)      Problem: Breastfeeding (Adult,Obstetrics,Pediatric)  Goal: Signs and Symptoms of Listed Potential Problems Will be Absent, Minimized or Managed (Breastfeeding)  Outcome: Ongoing (interventions implemented as appropriate)

## 2019-05-03 NOTE — LACTATION NOTE
Mom reports BF is going well. She denies questions at this time. Encouraged to call if needing assistance.      Lactation Consult Note    Evaluation Completed: 5/3/2019 9:55 AM  Patient Name: Stephanie Cobb  :  1985  MRN:  [id number]     REFERRAL  INFORMATION:                                         DELIVERY HISTORY:          Skin to skin initiation date/time: 2019  3:24 PM   Skin to skin end date/time: 2019  3:50 PM         MATERNAL ASSESSMENT:                               INFANT ASSESSMENT:  Information for the patient's :  Kasey Cobb [[id number]]   No past medical history on file.                                                                                                                                MATERNAL INFANT FEEDING:                                                                       EQUIPMENT TYPE:                                 BREAST PUMPING:          LACTATION REFERRALS:

## 2019-05-03 NOTE — PLAN OF CARE
Problem: Patient Care Overview  Goal: Plan of Care Review  Outcome: Ongoing (interventions implemented as appropriate)   19   Coping/Psychosocial   Plan of Care Reviewed With patient   Plan of Care Review   Progress improving   OTHER   Outcome Summary VSS, lochia increased at first but stable now, pain well controlled with PO meds and heating pad, breastfeeding infant, resting in between feeds       Problem: Anesthesia/Analgesia, Neuraxial (Obstetrics)  Goal: Signs and Symptoms of Listed Potential Problems Will be Absent, Minimized or Managed (Anesthesia/Analgesia, Neuraxial)  Outcome: Outcome(s) achieved Date Met: 19   Goal/Outcome Evaluation   Problems Assessed (Neuraxial Anesthesia/Analgesia, OB) all   Problems Present (Neuraxial Anesth OB) none       Problem: Fall Risk,  (Adult,Obstetrics,Pediatric)  Goal: Absence of Maternal Fall  Outcome: Ongoing (interventions implemented as appropriate)   19   Fall Risk,  (Adult,Obstetrics,Pediatric)   Absence of Maternal Fall making progress toward outcome     Goal: Absence of [place] Fall/Drop  Outcome: Ongoing (interventions implemented as appropriate)   19   Fall Risk,  (Adult,Obstetrics,Pediatric)   Absence of  Fall/Drop making progress toward outcome       Problem: Skin Injury Risk (Adult)  Goal: Skin Health and Integrity  Outcome: Ongoing (interventions implemented as appropriate)   19   Skin Injury Risk (Adult)   Skin Health and Integrity making progress toward outcome       Problem: Postpartum (Vaginal Delivery) (Adult,Obstetrics,Pediatric)  Goal: Signs and Symptoms of Listed Potential Problems Will be Absent, Minimized or Managed (Postpartum)  Outcome: Ongoing (interventions implemented as appropriate)   19   Goal/Outcome Evaluation   Problems Assessed (Postpartum Vaginal Delivery) all   Problems Present (Postpartum Vag Deliv) none

## 2019-05-03 NOTE — PROGRESS NOTES
5/3/2019    Name:Stephanie Cobb   MR#:[id number]    Vaginal Delivery Progress Note    HD#1    Subjective   Postpartum Day 1: 33 y.o. yo Female  delivered at 39w2d  delivered a male  infant.     The patient feels well.  Her pain is controlled.    She is ambulating well.  Patient describes her bleeding as intermittent passage of up to lemon-sized clots. Her bleeding is otherwise light. She has some pretty painful cramping as well    Breastfeeding: infant latching without difficulty.     Patient Active Problem List   Diagnosis   • Antepartum multigravida of advanced maternal age   • Pregnancy       Objective   Vital Signs Range for the last 24 hours  Temp: Temp:  [97.2 °F (36.2 °C)-99.4 °F (37.4 °C)] 98.2 °F (36.8 °C) Temp src: Oral   BP: BP: ()/(49-97) 118/81        Pulse: Heart Rate:  [] 98  RR: Resp:  [16-20] 20  Weight: 103 kg (226 lb)  BMI:  Body mass index is 36.48 kg/m².      Lab Results   Component Value Date    WBC 7.38 2019    HGB 10.7 (L) 2019    HCT 32.2 (L) 2019    MCV 98.2 (H) 2019     2019       Physical Exam  General:  no acute distresss.  Abdomen: abdomen is soft without significant tenderness, masses, organomegaly or guarding. Fundus: Firm with scant lochia, does have tenderness of fundus with deep palpation  Extremities: no cyanosis, and no edema, no CT    Perineum:  Intact    Assessment/Plan   1.  PPD# 1  and PP BTL  2. Intermittent passage of clots. Bedside sonogram with homogenous, 0.75-1cm endometrial stripe, no appearance of clot in uterus. Likely has intermittent atony, is multiparous. Will start PO methergine course.    Desires circumcision. Discussed is cosmetic. Risks of bleeding, damage to penis, infection and signs of infection, aftercare discussed. Desires to proceed.      Brina Campo MD  5/3/2019 1:06 PM

## 2019-05-04 VITALS
HEIGHT: 66 IN | WEIGHT: 226 LBS | HEART RATE: 82 BPM | TEMPERATURE: 97.9 F | OXYGEN SATURATION: 94 % | BODY MASS INDEX: 36.32 KG/M2 | DIASTOLIC BLOOD PRESSURE: 82 MMHG | SYSTOLIC BLOOD PRESSURE: 120 MMHG | RESPIRATION RATE: 18 BRPM

## 2019-05-04 PROBLEM — T81.49XA WOUND CELLULITIS AFTER SURGERY: Status: ACTIVE | Noted: 2019-05-04

## 2019-05-04 RX ORDER — CEPHALEXIN 500 MG/1
500 CAPSULE ORAL EVERY 6 HOURS SCHEDULED
Qty: 28 CAPSULE | Refills: 0 | Status: SHIPPED | OUTPATIENT
Start: 2019-05-04 | End: 2019-05-11

## 2019-05-04 RX ORDER — IBUPROFEN 800 MG/1
800 TABLET ORAL EVERY 8 HOURS SCHEDULED
Qty: 30 TABLET | Refills: 1 | Status: SHIPPED | OUTPATIENT
Start: 2019-05-04 | End: 2020-08-21 | Stop reason: HOSPADM

## 2019-05-04 RX ORDER — CEPHALEXIN 500 MG/1
500 CAPSULE ORAL EVERY 6 HOURS SCHEDULED
Status: DISCONTINUED | OUTPATIENT
Start: 2019-05-04 | End: 2019-05-04 | Stop reason: HOSPADM

## 2019-05-04 RX ORDER — OXYCODONE HYDROCHLORIDE AND ACETAMINOPHEN 5; 325 MG/1; MG/1
1 TABLET ORAL EVERY 4 HOURS PRN
Qty: 20 TABLET | Refills: 0 | Status: SHIPPED | OUTPATIENT
Start: 2019-05-04 | End: 2019-05-12

## 2019-05-04 RX ADMIN — IBUPROFEN 800 MG: 800 TABLET, FILM COATED ORAL at 04:04

## 2019-05-04 RX ADMIN — ACETAMINOPHEN 650 MG: 325 TABLET, FILM COATED ORAL at 01:53

## 2019-05-04 RX ADMIN — METHYLERGONOVINE MALEATE 200 MCG: 0.2 TABLET ORAL at 01:53

## 2019-05-04 RX ADMIN — IBUPROFEN 800 MG: 800 TABLET, FILM COATED ORAL at 12:32

## 2019-05-04 NOTE — PLAN OF CARE
Problem: Patient Care Overview  Goal: Plan of Care Review  Outcome: Ongoing (interventions implemented as appropriate)   05/04/19 0128   Coping/Psychosocial   Plan of Care Reviewed With patient   Plan of Care Review   Progress improving   OTHER   Outcome Summary VSS, pain well controlled, voiding without difficulty, methergine Q6 hours for blood clots throughout day       Problem: Postpartum (Vaginal Delivery) (Adult,Obstetrics,Pediatric)  Goal: Signs and Symptoms of Listed Potential Problems Will be Absent, Minimized or Managed (Postpartum)  Outcome: Ongoing (interventions implemented as appropriate)   05/04/19 0128   Goal/Outcome Evaluation   Problems Assessed (Postpartum Vaginal Delivery) all   Problems Present (Postpartum Vag Deliv) none       Problem: Breastfeeding (Adult,Obstetrics,Pediatric)  Goal: Signs and Symptoms of Listed Potential Problems Will be Absent, Minimized or Managed (Breastfeeding)  Outcome: Ongoing (interventions implemented as appropriate)   05/04/19 0128   Goal/Outcome Evaluation   Problems Assessed (Breastfeeding) all   Problems Present (Breastfeeding) none

## 2019-05-04 NOTE — PROGRESS NOTES
"Postpartum Vaginal Delivery Note PPD# 2    CC:  PPD 2    Assessment:     Pt doing well. Pain well controlled. Lochia normal. Ambulating well. Tolerating regular diet. Voiding without difficulty. No complaints.    Review of Systems - Negative except redness around incision site    Vitals:   /82 (BP Location: Left arm, Patient Position: Sitting)   Pulse 82   Temp 97.9 °F (36.6 °C) (Oral)   Resp 18   Ht 167.6 cm (66\")   Wt 103 kg (226 lb)   LMP 2018 (Exact Date)   SpO2 94%   Breastfeeding? Yes   BMI 36.48 kg/m²       Exam:   Neck:  No LAD or TM  Lungs:  Non labored breathing, no tachypnea  Gen: NAD, cooperative, conversive  Abd: Soft, nondistended, fundus is firm below umbillicus, approp tender  Incision CDI with surrounding erythema and warmth  Ext: Nontender bilaterally, edema- trace bilateral    Labs:  Lab Results (last 24 hours)     ** No results found for the last 24 hours. **          Assessment:  PPD 2 s/p vaginal delivery  Patient Active Problem List   Diagnosis   • Antepartum multigravida of advanced maternal age   • Pregnancy   • Wound cellulitis after surgery       Plan: Stephanie Cobb is a 33 y.o. female  s/p  Vaginal delivery- PPD 2    Start oral keflex for superficial cellulitis at umbilical incision site  Can be managed outpt    1. Doing well, d/c home today  2. Precautions given  3. RTC in 1 week for incision check.   4. Ambulation encouraged.         Signed By:  Payton Bob MD       May 4, 2019 10:43 AM       "

## 2019-05-04 NOTE — DISCHARGE SUMMARY
[facility]  Delivery Discharge Summary    Discharge Diagnosis: vaginal delivery  Status post postpartum tubal ligation    Primary OB Clinician: Arun    EDC: Estimated Date of Delivery: 19    Gestational Age:39w2d    Antepartum complications: none    Date of Delivery: 2019   Time of Delivery: 3:21 PM     Delivered By:  Juan Ramon Dias     Delivery Type: Vaginal, Spontaneous      Tubal Ligation: done after vaginal delivery by delivering physician, same day    Baby:@BABYNOHDR(SEX)@ infant;   Apgar:  7   @ 1 minute /   Apgar:  9   @ 5 minutes        Anesthesia: Epidural      Intrapartum complications: None    Laceration: No    Episiotomy: No    Placenta: Spontaneous     Feeding method: Breastfeeding Status: Yes    Rh Immune globulin given: no    Rubella vaccine given: no    Discharge Date: 2019; Discharge Time: 4:42 PM    Early Discharge:  NO  Hospital Course:  The patient was admitted following delivery.  She did well postpartum with normal return of bowel function and remained afebrile.    Blood type:  Rubella immune  dTap given prenatally        Plan:    Follow-up appointment with Dr Dias in 1 week.

## 2019-05-06 LAB
CYTO UR: NORMAL
LAB AP CASE REPORT: NORMAL
PATH REPORT.FINAL DX SPEC: NORMAL
PATH REPORT.GROSS SPEC: NORMAL

## 2019-05-09 NOTE — ANESTHESIA POSTPROCEDURE EVALUATION
"Patient: Stephanie Cobb    Procedure Summary     Date:  05/02/19 Room / Location:   [facility]    Anesthesia Start:  1556 Anesthesia Stop:  1637    Procedure:  POSTPARTUM TUBAL LIGATION (Bilateral Abdomen) Diagnosis:      Surgeon:  Juan Ramon Dias MD Provider:  Romeo Rai MD    Anesthesia Type:  Not recorded ASA Status:  Not recorded          Anesthesia Type: No value filed.  Last vitals  BP   120/82 (05/04/19 0758)   Temp   36.6 °C (97.9 °F) (05/04/19 0758)   Pulse   82 (05/04/19 0758)   Resp   18 (05/04/19 0758)     SpO2   94 % (05/02/19 2300)     Post Anesthesia Care and Evaluation      Comments: Patient discharged before being evaluated by an Anesthesiologist. No apparent complications per the record.  This case was not medically directed. I am completing this chart for medical records purposes; I personally have no medical involvement with this patient.    /82 (BP Location: Left arm, Patient Position: Sitting)   Pulse 82   Temp 36.6 °C (97.9 °F) (Oral)   Resp 18   Ht 167.6 cm (66\")   Wt 103 kg (226 lb)   LMP 07/22/2018 (Exact Date)   SpO2 94%   Breastfeeding? Yes   BMI 36.48 kg/m²           "

## 2019-05-14 ENCOUNTER — POSTPARTUM VISIT (OUTPATIENT)
Dept: OBSTETRICS AND GYNECOLOGY | Age: 34
End: 2019-05-14

## 2019-05-14 VITALS
SYSTOLIC BLOOD PRESSURE: 130 MMHG | HEIGHT: 66 IN | DIASTOLIC BLOOD PRESSURE: 80 MMHG | BODY MASS INDEX: 33.11 KG/M2 | WEIGHT: 206 LBS

## 2019-05-14 PROCEDURE — 0503F POSTPARTUM CARE VISIT: CPT | Performed by: NURSE PRACTITIONER

## 2019-05-14 NOTE — PROGRESS NOTES
"Subjective   Stephanie Cobb is a 33 y.o. female is being seen today for   Chief Complaint   Patient presents with   • Postpartum Care     Incision check   .    History of Present Illness     Patient here for incision check following  BTL and vaginal delivery on 5/2/2019  Prior to discharge she was started on Keflex for possible cellulitis near abdominal incision  Patient states she is feeling great and denies pain  States redness that was noted in the hospital is resolved  She is breastfeeding and baby is doing well  No fevers  Normal bowel and bladder    The following portions of the patient's history were reviewed and updated as appropriate: allergies, current medications, past family history, past medical history, past social history, past surgical history and problem list.    /80   Ht 167.6 cm (66\")   Wt 93.4 kg (206 lb)   LMP 07/22/2018 (Exact Date)   Breastfeeding? Yes   BMI 33.25 kg/m²         Review of Systems   Constitutional: Negative.    HENT: Negative.    Eyes: Negative.    Respiratory: Negative.    Cardiovascular: Negative.    Gastrointestinal: Negative.    Endocrine: Negative.    Genitourinary: Negative.    Musculoskeletal: Negative.    Skin: Negative.    Allergic/Immunologic: Negative.    Neurological: Negative.    Hematological: Negative.    Psychiatric/Behavioral: Negative.        Objective   Physical Exam   Constitutional: She is oriented to person, place, and time. She appears well-developed and well-nourished.   Abdominal: Soft.   Incision C/D/I, no redness or induration   Neurological: She is alert and oriented to person, place, and time. She has normal reflexes.   Psychiatric: She has a normal mood and affect. Her behavior is normal.         Assessment/Plan   Stephanie was seen today for postpartum care.    Diagnoses and all orders for this visit:    Postpartum exam      Normal incision check s/p BTL.  Plan follow up for 6 week postpartum visit.  She will complete abxs that were given " to her at the hospital and call with any concerns

## 2019-06-18 ENCOUNTER — POSTPARTUM VISIT (OUTPATIENT)
Dept: OBSTETRICS AND GYNECOLOGY | Age: 34
End: 2019-06-18

## 2019-06-18 VITALS
HEIGHT: 66 IN | DIASTOLIC BLOOD PRESSURE: 78 MMHG | WEIGHT: 202 LBS | BODY MASS INDEX: 32.47 KG/M2 | SYSTOLIC BLOOD PRESSURE: 120 MMHG

## 2019-06-18 PROCEDURE — 0503F POSTPARTUM CARE VISIT: CPT | Performed by: OBSTETRICS & GYNECOLOGY

## 2019-06-18 NOTE — PROGRESS NOTES
"Subjective     Chief Complaint   Patient presents with   • Post-op Follow-up     6 wk postpartum check:Vaginal del on 5/2/19,breast and bottle feeding,Sreekanth,male,8lbs.,11oz.       Baby's name: Sreekanth Cobb is a 33 y.o. female who presents for a postpartum visit. She is 6 weeks postpartum following a spontaneous vaginal delivery. I have fully reviewed the prenatal and intrapartum course. Postpartum course has been uneventful. Baby is feeding by breast. Bleeding has been normal in amount and decreasing.. Bowel function is normal. Bladder function is normal. Patient not sexually active at this time. Contraception method is discussed. Postpartum depression screening: negative.    The following portions of the patient's history were reviewed and updated as appropriate: allergies, current medications, past family history, past medical history, past social history, past surgical history and problem list.    Review of Systems  Pertinent items are noted in HPI.    Objective   /78   Ht 167.6 cm (66\")   Wt 91.6 kg (202 lb)   Breastfeeding? Yes   BMI 32.60 kg/m²    General:  alert    Breasts:  normal       Heart:  regular rate and rhytm   Abdomen: Normal findings    Vulva:  normal   Vagina: normal   Cervix:  Normal with no cervical motion tenderness   Corpus: Normal for post partum visit   Adnexa:  Non tender, non enlarged         Assessment/Plan   Encounter Diagnosis   Name Primary?   • Postpartum care and examination Yes     Normal postpartum exam. Pap smear done at today's visit.     Contraception: Postpartum tubal ligation   Slow return to normal activities reviewed. Continue prenatal vitamins.   Follow up in 12 months  Or sooner  as needed.             EMR Dragon/ Transcription disclaimer:  Much of the encounter note is an electronic transcription/translation of spoken language to printed text. The electronic translation of spoken language may permit erroneous, or at times, nonessential words or " phrases to be inadvertently transcribes; Although i have reviewed the note for such errors, some may still exist.

## 2019-06-21 LAB
CYTOLOGIST CVX/VAG CYTO: NORMAL
CYTOLOGY CVX/VAG DOC CYTO: NORMAL
CYTOLOGY CVX/VAG DOC THIN PREP: NORMAL
DX ICD CODE: NORMAL
HIV 1 & 2 AB SER-IMP: NORMAL
HPV I/H RISK 4 DNA CVX QL PROBE+SIG AMP: NEGATIVE
OTHER STN SPEC: NORMAL
STAT OF ADQ CVX/VAG CYTO-IMP: NORMAL

## 2019-07-22 ENCOUNTER — TELEPHONE (OUTPATIENT)
Dept: OBSTETRICS AND GYNECOLOGY | Age: 34
End: 2019-07-22

## 2020-06-22 ENCOUNTER — OFFICE VISIT (OUTPATIENT)
Dept: OBSTETRICS AND GYNECOLOGY | Age: 35
End: 2020-06-22

## 2020-06-22 VITALS
HEIGHT: 66 IN | WEIGHT: 181 LBS | DIASTOLIC BLOOD PRESSURE: 70 MMHG | BODY MASS INDEX: 29.09 KG/M2 | SYSTOLIC BLOOD PRESSURE: 130 MMHG

## 2020-06-22 DIAGNOSIS — Z01.419 ENCOUNTER FOR GYNECOLOGICAL EXAMINATION: Primary | ICD-10-CM

## 2020-06-22 DIAGNOSIS — M79.601 RIGHT ARM PAIN: ICD-10-CM

## 2020-06-22 DIAGNOSIS — G43.109 MIGRAINE WITH AURA AND WITHOUT STATUS MIGRAINOSUS, NOT INTRACTABLE: ICD-10-CM

## 2020-06-22 PROBLEM — T81.49XA WOUND CELLULITIS AFTER SURGERY: Status: RESOLVED | Noted: 2019-05-04 | Resolved: 2020-06-22

## 2020-06-22 PROCEDURE — 99395 PREV VISIT EST AGE 18-39: CPT | Performed by: OBSTETRICS & GYNECOLOGY

## 2020-06-22 RX ORDER — UBIDECARENONE 100 MG
100 CAPSULE ORAL DAILY
COMMUNITY
End: 2021-11-24 | Stop reason: HOSPADM

## 2020-06-22 RX ORDER — CHLORAL HYDRATE 500 MG
CAPSULE ORAL
COMMUNITY
End: 2021-11-24 | Stop reason: HOSPADM

## 2020-06-22 RX ORDER — RIZATRIPTAN BENZOATE 10 MG/1
10 TABLET ORAL
COMMUNITY
Start: 2020-02-19 | End: 2020-12-16 | Stop reason: SDUPTHER

## 2020-06-22 RX ORDER — OMEPRAZOLE 20 MG/1
20 CAPSULE, DELAYED RELEASE ORAL DAILY
COMMUNITY
Start: 2019-10-21 | End: 2020-08-21 | Stop reason: HOSPADM

## 2020-06-22 NOTE — PROGRESS NOTES
Subjective     Chief Complaint   Patient presents with   • Gynecologic Exam     Annual:Last pap          History of Present Illness      Stephanie Cobb is a very pleasant  34 y.o. female who presents for annual exam.  Mammo Exam none, Contraception tubal ligation, Exercise 4 times a week    Patient without gynecological concerns or complaints but she does have several concerns.  Her migraines seem to be increasing and she has seen several physicians for that.  She does not necessarily want to go on a daily medication.  But other therapies including homeopathic, have not worked she has headaches 1-2 times a week and they can be disabling.  She does have and are often times.    Additionally she has had some right arm pain since delivery and no neurological loss of function but just very tender.      Obstetric History:  OB History        4    Para   4    Term   4            AB        Living   4       SAB        TAB        Ectopic        Molar        Multiple   0    Live Births   4               Menstrual History:     Patient's last menstrual period was 2020 (approximate).       Sexual History:       Past Medical History:   Diagnosis Date   • Hx of blood clots     Rt.leg,superficial   • Urinary tract infection      Past Surgical History:   Procedure Laterality Date   • CYSTECTOMY      Left   • DENTAL PROCEDURE Left    • DIAGNOSTIC LAPAROSCOPY Left 2018    Procedure: DIAGNOSTIC LAPAROSCOPY/LT.OVARIAN CYSTECTOMY;  Surgeon: Juan Ramon Dias MD;  Location: [facility] OR;  Service: Obstetrics/Gynecology   • TUBAL ABDOMINAL LIGATION     • TUBAL COAGULATION LAPAROSCOPIC Bilateral 2019    Procedure: POSTPARTUM TUBAL LIGATION;  Surgeon: Juan Ramon Dias MD;  Location: [facility] LABOR DELIVERY;  Service: Obstetrics/Gynecology   • WISDOM TOOTH EXTRACTION         SOCIAL Hx:      The following portions of the patient's history were reviewed and updated as appropriate: allergies, current medications, past  "family history, past medical history, past social history, past surgical history and problem list.    Review of Systems        Except as outlined in history of physical illness, patient denies any changes in her GYN, , GI systems.  All other systems reviewed were negative.         Objective   Physical Exam    /70   Ht 167.6 cm (66\")   Wt 82.1 kg (181 lb)   LMP 06/02/2020 (Approximate)   Breastfeeding No   BMI 29.21 kg/m²     General: Patient is alert and oriented and appears overall healthy  Neck: Is supple without thyromegaly, no carotid bruits and no lymphadenopathy  Extremity-right arm is normal with mobility vascular pulses neurological exam  Lungs: Clear bilaterally, no wheezing, rhonchi, or rales.  Respiratory rate is normal  Breast: Even, symmetrical, no lymphadenopathy, no retraction, no masses or cysts  Heart: Regular rate and rhythm are appreciated, no murmurs or rubs are heard  Abdomen: Is soft, without organomegaly, bowel sounds are positive, there is no                                rebound or guarding and palpation does not produce any discomfort  Back: Nontender without CVA tenderness  Pelvic: External genitalia appear normal and consistent with mature female.  BUS normal                            Vagina is clean dry without discharge and appears adequately estrogenized, no               lesions or masses are present                         Cervix is noninflamed without discharge or lesions.  There is no cervical motion             tenderness.                Uterus is nonenlarged, without tenderness, and no masses or abnormalities are  present               Adnexa are non-enlarged, non tender               Rectal exam reveals adequate sphincter tone and no masses or lesions are                     appreciated on digital rectal examination.      Annual Well Woman Exam  Patient Active Problem List   Diagnosis   • Antepartum multigravida of advanced maternal age   • Pregnancy   • Migraine " with aura and without status migrainosus, not intractable                 Assessment/Plan   Stephanie was seen today for gynecologic exam.    Diagnoses and all orders for this visit:    Encounter for gynecological examination  -     IGP, Apt HPV,rfx 16 / 18,45  -     CBC (No Diff)  -     Comprehensive Metabolic Panel  -     Thyroid Cascade Profile  -     Lipid Panel  -     Vitamin D 25 Hydroxy    Migraine with aura and without status migrainosus, not intractable    Right arm pain    Patient will try a wrist splint for possible sequelae from carpal tunnel from pregnancy, if that does not improve she will call us and we will likely have her see physical therapist.      Discussed today's findings and concerns with patient.  Continue to recommend regular exercise including cardiovascular and resistance training as well as  breast self-exam. Wellness lab, mammography, & pap smear, in accordance with age guidelines.    I have encouraged her to call for today's test results if she has not received them within 10 days.  Patient is advised to call with any change in her condition or with any other questions, otherwise return  for annual examination.

## 2020-06-25 ENCOUNTER — TELEPHONE (OUTPATIENT)
Dept: OBSTETRICS AND GYNECOLOGY | Age: 35
End: 2020-06-25

## 2020-06-25 LAB
25(OH)D3+25(OH)D2 SERPL-MCNC: 25.8 NG/ML (ref 30–100)
ALBUMIN SERPL-MCNC: 4.4 G/DL (ref 3.5–5.2)
ALBUMIN/GLOB SERPL: 1.7 G/DL
ALP SERPL-CCNC: 49 U/L (ref 39–117)
ALT SERPL-CCNC: 7 U/L (ref 1–33)
AST SERPL-CCNC: 10 U/L (ref 1–32)
BILIRUB SERPL-MCNC: 0.4 MG/DL (ref 0.2–1.2)
BUN SERPL-MCNC: 9 MG/DL (ref 6–20)
BUN/CREAT SERPL: 13.4 (ref 7–25)
CALCIUM SERPL-MCNC: 9.3 MG/DL (ref 8.6–10.5)
CHLORIDE SERPL-SCNC: 104 MMOL/L (ref 98–107)
CHOLEST SERPL-MCNC: 223 MG/DL (ref 0–200)
CO2 SERPL-SCNC: 23.9 MMOL/L (ref 22–29)
CREAT SERPL-MCNC: 0.67 MG/DL (ref 0.57–1)
ERYTHROCYTE [DISTWIDTH] IN BLOOD BY AUTOMATED COUNT: 12.1 % (ref 12.3–15.4)
GLOBULIN SER CALC-MCNC: 2.6 GM/DL
GLUCOSE SERPL-MCNC: 101 MG/DL (ref 65–99)
HCT VFR BLD AUTO: 37.4 % (ref 34–46.6)
HDLC SERPL-MCNC: 43 MG/DL (ref 40–60)
HGB BLD-MCNC: 12.6 G/DL (ref 12–15.9)
LDLC SERPL CALC-MCNC: 158 MG/DL (ref 0–100)
MCH RBC QN AUTO: 30.3 PG (ref 26.6–33)
MCHC RBC AUTO-ENTMCNC: 33.7 G/DL (ref 31.5–35.7)
MCV RBC AUTO: 89.9 FL (ref 79–97)
PLATELET # BLD AUTO: 295 10*3/MM3 (ref 140–450)
POTASSIUM SERPL-SCNC: 4.9 MMOL/L (ref 3.5–5.2)
PROT SERPL-MCNC: 7 G/DL (ref 6–8.5)
RBC # BLD AUTO: 4.16 10*6/MM3 (ref 3.77–5.28)
SODIUM SERPL-SCNC: 138 MMOL/L (ref 136–145)
TRIGL SERPL-MCNC: 111 MG/DL (ref 0–150)
TSH SERPL DL<=0.005 MIU/L-ACNC: 1.63 UIU/ML (ref 0.45–4.5)
VLDLC SERPL CALC-MCNC: 22.2 MG/DL
WBC # BLD AUTO: 4.22 10*3/MM3 (ref 3.4–10.8)

## 2020-06-25 NOTE — TELEPHONE ENCOUNTER
----- Message from Juan Ramon Dias MD sent at 6/25/2020 12:34 PM EDT -----  Please let Faraz know that her iron count is normal.  Thyroid hormone is normal, vitamin D is slightly low she should be at least 2000 units daily  Additionally her fasting sugar and lipids are slightly elevated would recommend follow-up with her PCP for management

## 2020-06-25 NOTE — PROGRESS NOTES
Please let Faraz know that her iron count is normal.  Thyroid hormone is normal, vitamin D is slightly low she should be at least 2000 units daily  Additionally her fasting sugar and lipids are slightly elevated would recommend follow-up with her PCP for management

## 2020-08-21 ENCOUNTER — OFFICE VISIT (OUTPATIENT)
Dept: NEUROLOGY | Facility: CLINIC | Age: 35
End: 2020-08-21

## 2020-08-21 VITALS
SYSTOLIC BLOOD PRESSURE: 128 MMHG | DIASTOLIC BLOOD PRESSURE: 80 MMHG | HEART RATE: 70 BPM | WEIGHT: 182 LBS | HEIGHT: 66 IN | BODY MASS INDEX: 29.25 KG/M2 | OXYGEN SATURATION: 97 %

## 2020-08-21 DIAGNOSIS — G43.109 MIGRAINE WITH AURA AND WITHOUT STATUS MIGRAINOSUS, NOT INTRACTABLE: Primary | ICD-10-CM

## 2020-08-21 PROCEDURE — 99205 OFFICE O/P NEW HI 60 MIN: CPT | Performed by: NURSE PRACTITIONER

## 2020-08-21 RX ORDER — TOPIRAMATE 25 MG/1
25 TABLET ORAL NIGHTLY
Qty: 30 TABLET | Refills: 0 | Status: SHIPPED | OUTPATIENT
Start: 2020-08-21 | End: 2020-09-30

## 2020-08-21 RX ORDER — ADHESIVE TAPE 3"X 2.3 YD
TAPE, NON-MEDICATED TOPICAL
COMMUNITY
End: 2021-11-24 | Stop reason: HOSPADM

## 2020-08-21 RX ORDER — CYANOCOBALAMIN (VITAMIN B-12) 1000 MCG
TABLET ORAL
COMMUNITY
End: 2021-11-24 | Stop reason: HOSPADM

## 2020-08-21 NOTE — PROGRESS NOTES
"DOS: 2020  NAME: Stephanie Cobb   : 1985  PCP: Mary Kuhn APRN    Chief Complaint   Patient presents with   • Migraine      SUBJECTIVE  Neurological Problem:  35 y.o. RHW female with h/o fluctuating BP, HLD, migraines who presents today for migraines.  She is unaccompanied. Patient and problem are new to examiner. History is provided by patient and review of records that are summarized below.     HPI:   Ms. Cobb presents today to establish care for migraines, was referred by her OB/GYN, review of progress notes dated 20 indicate that she is her migraines were increasing and she had been to several physicians previously.  Did not necessarily want to go on a daily medication was having headaches 1-2 times per week that could be disabling.m Review of PCP notes from 20 indicate patient's headaches were \"doing much better\" on co-Q10, Mag-Ox and rizatriptan as needed, med list also reports daily propranolol, riboflavin.  She was also noted to have decreased energy, hypercholesterolemia and vitamin D deficiency, started on supplementation.  Review of labs from 2020 indicate a CMP with a slightly elevated glucose at 101, otherwise unremarkable; CBC essentially normal; TSH 1.63; lipid panel with a total of 223, HDL 43, , ; vitamin D 25.8.  On 2020, vitamin B was 279, anemia profile with an iron of 103, ferritin 22.5, iron sat 30, TIBC 343. She was started on B12 and ferrous gluconate.     She tells me today that has had \"regular\" headaches all of her life, including headaches that would make her feel sick, want to lay down, but did not occur regularly and not debilitating. In  she had a BP spike two weeks after giving birth, had a bad headache, was taking tylenol, found to have high BP, went to the ER, was given BP med that resolved symptoms, had labile BPs that she would treat as needed. Been off and on BP meds to manage, last med was a diuretic,\"felt great\", had maybe " "only 2 headaches.  She did have an ED visit in 2017, indicating she her headaches occurred in the past when her diastolic was around 90, BP documented as 139/104, HR 94; EKG NSR; CT head with no acute findings.  She was treated with migraine cocktail of IV phenergan, magnesium and Toradol injection.     She describes her previous headaches as starting with a piercing pain in right temple, pounding, get flushed, typically BP is higher is she checks it during one of these but now her BP is normal and recently with migraines it has actually been on the \"low side\".  She has also had some reports of \"low heart rate \"on her iWatch, per patient down in the 30s. She reports h/o \"flutters\", had a Holter in past, was told she have PACs. May possibly have some increased symptoms. Her headache pattern in the last month, had at least one a week, can last up to 3 days. She denies any associated neurologic changes.     Description of Headaches  Location of pain: Primarily starts on right side of head, can envelope who head  Character of pain: Starts with pressure, develops into a pounding throb.   Severity of pain: At its worst has reached \"9/10\", at \"5 or 6\" takes something that brings it down to a \"2/10\"  Prodromal sx: Flashing lights, floaters, visual aura prior to migraine  Light sensitivity?: Yes   Sound sensitivity?: Yes  Nausea?: Yes  Vomiting?: Yes at its worst.   Typical duration of individual headache: If she medicates early enough, two hours.   Typical triggers: Sometimes food (dairy intolerant), more caffeine than usual, stress;  Alleviating factors: None -- ice, heat, massage none of these help.  Associated symptoms?: Sometimes lightheadedness  Associated with position and/or activity? Significant exertion can bring on headache    Medications to treat Headaches  Abortive meds?  Ibuprofen, Rizatriptan, works when she takes it early enough.  Frequency of use? 2-3 times a week of ibuprofen.   Prophylactic meds? Recently " "started on vitamin mix of Co-Q10, Riboflavin and fish oil. Also given Mag Oxide -- started about 3 months ago -- felt like it improved the frequency of headaches but has since started to \"pick back up in frequency\"    Additional relevant information  Hours of sleep per night? [age] year old still not sleeping through the night; on average gets 7 hours but is usually interrupted sleep.   Sleep apnea? No  Caffeine use per day? 1 cup of coffee in morning;   Amount of water intake per day? 4-5 glasses per day  Smoking, vaping, drug/alcohol use? None  Birth control? Tubal ligation, NOT breastfeeding  Days of work missed d/t HA? No  ER visits? 2014, 2017  Personal history of trauma, seizures, stroke, CNS infections: None  Family history of headaches: Possibly in mom    Review of Systems:Review of Systems   Constitutional: Positive for fatigue. Negative for activity change and appetite change.   HENT: Positive for tinnitus. Negative for ear pain and trouble swallowing.    Eyes: Negative for photophobia, pain and visual disturbance.   Musculoskeletal: Positive for back pain and gait problem. Negative for neck pain.   Neurological: Positive for dizziness, light-headedness and headaches. Negative for tremors, seizures, syncope, facial asymmetry, speech difficulty, weakness and numbness.   Psychiatric/Behavioral: Positive for sleep disturbance. Negative for agitation, behavioral problems, confusion, decreased concentration, dysphoric mood, hallucinations, self-injury and suicidal ideas. The patient is not nervous/anxious and is not hyperactive.     Above ROS reviewed    The following portions of the patient's history were reviewed and updated as appropriate: allergies, current medications, past family history, past medical history, past social history, past surgical history and problem list.    Current Medications:   Current Outpatient Medications:   •  Ascorbic Acid (VITAMIN C PO), Take  by mouth., Disp: , Rfl:   •  coenzyme " Q10 100 MG capsule, Take 100 mg by mouth Daily., Disp: , Rfl:   •  Cyanocobalamin (B-12) 1000 MCG tablet, , Disp: , Rfl:   •  ferric gluconate in sodium chloride 0.9 % 100 mL IVPB, Infuse  into a venous catheter 1 (One) Time., Disp: , Rfl:   •  Magnesium Oxide (Mag-Oxide) 200 MG tablet, , Disp: , Rfl:   •  Omega-3 Fatty Acids (FISH OIL) 1000 MG capsule capsule, Take  by mouth Daily With Breakfast., Disp: , Rfl:   •  Riboflavin 100 MG capsule, Take  by mouth., Disp: , Rfl:   •  rizatriptan (MAXALT) 10 MG tablet, Take 10 mg by mouth., Disp: , Rfl:   •  VITAMIN D PO, Take  by mouth., Disp: , Rfl:   •  topiramate (Topamax) 25 MG tablet, Take 1 tablet by mouth Every Night., Disp: 30 tablet, Rfl: 0  **I did not stop or change the above medications.  Patient's medication list was updated to reflect medications they have reported as currently taking, including medication changes made by other providers.    OBJECTIVE  Vitals:    08/21/20 1346   BP: 128/80   Pulse: 70   SpO2: 97%     Body mass index is 29.39 kg/m².    Diagnostics:  CT head 12/3/17 ([facility]): FINDINGS: Standard technique noncontrast head CT demonstrates no evidence for acute intracranial hemorrhage, mass, mass-effect or shift of midline structures. Ventricular size and configuration is within normal limits. The gray-white interface is maintained. No abnormal extra-axial fluid collection. Basilar cisterns are patent.  Bone windows demonstrate no acute craniofacial abnormalities. The paranasal sinuses demonstrate adequate pneumatization.. The lens, globes and retrobulbar contents are unremarkable.  IMPRESSION:1. No evidence for acute intracranial hemorrhage, mass lesion or ischemia.    Laboratory Results:         Lab Results   Component Value Date    WBC 4.22 06/24/2020    HGB 12.6 06/24/2020    HCT 37.4 06/24/2020    MCV 89.9 06/24/2020     06/24/2020     Lab Results   Component Value Date    BUN 9 06/24/2020    CREATININE 0.67 06/24/2020    EGFRIFNONA  100 06/24/2020    EGFRIFAFRI 121 06/24/2020    BCR 13.4 06/24/2020    K 4.9 06/24/2020    CO2 23.9 06/24/2020    CALCIUM 9.3 06/24/2020    PROTENTOTREF 7.0 06/24/2020    ALBUMIN 4.40 06/24/2020    LABIL2 1.7 06/24/2020    AST 10 06/24/2020    ALT 7 06/24/2020     No results found for: HGBA1C  No results found for: CHOL  Lab Results   Component Value Date    HDL 43 06/24/2020     Lab Results   Component Value Date     (H) 06/24/2020     Lab Results   Component Value Date    TRIG 111 06/24/2020     Lab Results   Component Value Date    RPR Non-Reactive 02/10/2016     Lab Results   Component Value Date    TSH 1.630 06/24/2020     Lab Results   Component Value Date    [id number] 279 07/06/2020       Physical Examination:   General Appearance:   Well developed, well nourished, well groomed, alert, and cooperative.  HEENT: Normocephalic.    Neck and Spine: Normal range of motion.  Normal alignment. No mass or tenderness. No bruits.  Cardiac: Regular rate and rhythm.   Peripheral Vasculature: Radial pulses are equal and symmetric. No signs of distal embolization.  Extremities:    No edema or deformities. Normal joint ROM.  Skin:    No rashes or birth marks.  Psychiatric:    Good mood, normal affect. Thought process normal.    Neurological examination:  Higher Integrative  Function: Oriented to time, place and person. Normal registration, recall, attention span and concentration. Normal language including comprehension, spontaneous speech, repetition, reading, naming and vocabulary. No neglect. Normal fund of knowledge and higher integrative function.  CN II: Pupils are equal, round, and reactive to light. Normal visual acuity and visual fields.    CN III IV VI: Extraocular movements are full without nystagmus.   CN V: Normal facial sensation and strength of muscles of mastication.  CN VII: Facial movements are symmetric. No weakness.  CN VIII:   Auditory acuity is normal.  CN IX & X:   Symmetric palatal  movement.  CN XI: Sternocleidomastoid and trapezius are normal.  No weakness.  CN XII:   The tongue is midline.  No atrophy or fasciculations.  Motor: Normal muscle strength, bulk and tone in upper and lower extremities.  No fasciculations, rigidity, spasticity, or abnormal movements.  Reflexes: 2+ in the upper and lower extremities. Toes down-going bilaterally  Sensation: Normal to light touch, vibration, temperature, and proprioception in arms and legs. Normal graphesthesia and no extinction on DSS.  Station and Gait: Normal gait and station.    Coordination: Finger to nose test shows no dysmetria.  Heel to shin normal.    Impression:  Ms. Cobb presents to establish care for migraines, worsening in frequency in the last several months.  For prevention she is on Mag-Ox, riboflavin, co-Q10.  Was on propanolol previously, but on any other preventive medication.  For rescue rizatriptan and ibuprofen appear to be effective if taken early.  We reviewed pharmacologic treatment options for preventive as well as rescue medications, given the frequency of her headaches she would benefit from a daily medication.  She has tried propranolol in the past, and given her low heart rate concerns, not a good option at this point.  Amitriptyline or Topamax would be a good choice, however, due to potential weight gain and sedative effects with amitriptyline (needs to be able to get up with her one-year-old at night), patient agrees with trial of topiramate. No history of kidney stones.  We discussed purpose, indication, risk, benefits and potential side effects.  We also reviewed at length the importance of lifestyle modifications.  Recommend she increase her Mag-Ox to 500 mg daily and riboflavin to 400 mg daily.  She will call in a couple weeks, follow-up in office in 1 month.  She voiced understanding and agrees with plan.    Plan:     1. Chronic migraine with aura  Start topiramate 25 mg qhs x 1 week, titrate to 50 mg nightly if  tolerates.  Recommend Magnesium 500mg and Riboflavin 400 mg daily  Patient to call regarding effectiveness of above  Continue rizatriptan, ibuprofen for rescue treatment, discussed importance of using sparingly to avoid medication overuse headache.  Lifestyle modifications as noted below  Follow-up in 1 month    Lifestyle modifications for headache prevention:  Recommendations:   -Increase water intake, decrease caffeine  -Get plenty of rest, practice good sleep hygiene -- Consider sleep evaluation for HILDA if indicated  -Maintain a stable daily schedule (going to sleep and waking up the same time each day; eating regular meals; maintaining a low stress lifestyle)  -Get regular physical activity  -Minimize OTC medications use to avoid medication overuse headaches     Greater than 50% of this 65-minute visit was spent counseling patient regarding diagnosis, review of diagnostics, medication treatment options including purpose, risk, benefits, possible side effects as well as recommended lifestyle modifications and preventative measures    Stephanie was seen today for migraine.    Diagnoses and all orders for this visit:    Migraine with aura and without status migrainosus, not intractable    Other orders  -     topiramate (Topamax) 25 MG tablet; Take 1 tablet by mouth Every Night.        Coding      Dictated using Dragon

## 2020-08-24 ENCOUNTER — TELEPHONE (OUTPATIENT)
Dept: NEUROLOGY | Facility: CLINIC | Age: 35
End: 2020-08-24

## 2020-08-24 NOTE — TELEPHONE ENCOUNTER
Spoke to pt. Informed her that Huma TAO is out of the office today and will be back tomorrow. Advised her that if she had not already to stop the medication, pt stated she had already done so. Assured her that I would send msg to Huma TAO to see what pt should do next. She stated understanding and thanks.     Please review.  Thank you.

## 2020-08-24 NOTE — TELEPHONE ENCOUNTER
THE PT CALLED IN STATING THAT   THE NEW MEDICATION  TOPAMAX SHE IS ON IS HAVING SOME ISSUES    SHE HAD FOGGINESS AND THEN SHE HAD A HEADACHE AND THEN WENT TO ALMOST MIGRAINE LEVELS  FOR A COUPLE DAYS   SHE HAD VERY HIGH BLOOD PRESSURE DURING THIS PERIOD . SHE HAS SEVERE PAIN IN HER LEFT TEMPLE  AND BEHIND HER EYE . SHE FEELS NAUSEAS  AND  CHILLS .   SHE WOULD LIKE TO KNOW WHAT TO DO ABOUT THE PAIN AND BLOOD PRESSURE SHE WOULD LIKE A CALL BACK [phone]

## 2020-08-25 ENCOUNTER — TELEPHONE (OUTPATIENT)
Dept: NEUROLOGY | Facility: CLINIC | Age: 35
End: 2020-08-25

## 2020-08-25 NOTE — TELEPHONE ENCOUNTER
I discussed with patient via phone today difficulties she experienced after she started topamax, suffered from significant mental fog, grogginess, noticed by her spouse, then developed a worsening headache followed by severe migraine requiring several doses of rescue medication along with aleeve which eventually lessened symptoms. She discontinued topamax and continues with mild headache. She will continue with aleeve prn, keep well hydrated and continue with mag and riboflavin. We also discussed preventive therapy options including nortriptyline, CGRP inhibitors. Given the sedating effects and patient's concern over possible flattening affect will trial Aimovig 70 mg/ml. She will come to office for first dose, will call office one hour prior. Patient voiced understanding and agrees with plan.

## 2020-09-30 ENCOUNTER — OFFICE VISIT (OUTPATIENT)
Dept: NEUROLOGY | Facility: CLINIC | Age: 35
End: 2020-09-30

## 2020-09-30 VITALS
BODY MASS INDEX: 29.25 KG/M2 | DIASTOLIC BLOOD PRESSURE: 72 MMHG | OXYGEN SATURATION: 98 % | HEIGHT: 66 IN | WEIGHT: 182 LBS | HEART RATE: 69 BPM | SYSTOLIC BLOOD PRESSURE: 110 MMHG

## 2020-09-30 DIAGNOSIS — G43.109 MIGRAINE WITH AURA AND WITHOUT STATUS MIGRAINOSUS, NOT INTRACTABLE: Primary | ICD-10-CM

## 2020-09-30 PROCEDURE — 99213 OFFICE O/P EST LOW 20 MIN: CPT | Performed by: NURSE PRACTITIONER

## 2020-09-30 RX ORDER — ERENUMAB-AOOE 70 MG/ML
70 INJECTION SUBCUTANEOUS ONCE
Qty: 1 ML | Refills: 6 | Status: SHIPPED | OUTPATIENT
Start: 2020-09-30 | End: 2020-09-30

## 2020-09-30 NOTE — PROGRESS NOTES
"DOS: 2020  NAME: Stephanie Cobb   : 1985  PCP: Mary Kuhn APRN    Chief Complaint   Patient presents with   • Migraine      SUBJECTIVE  Neurological Problem:  35 y.o. RHW female with h/o fluctuating BP, HLD, migraines who presents today for migraines.  She is unaccompanied.    Interval History:   **For previous history, please see progress note dated 2020.    Ms. Cobb has h/o migraines, worsening in frequency and therefore was referred to neurology. She was initially seen in 2020 and for  prevention was on Mag-Ox, riboflavin, co-Q10.  She was also on propanolol previously for BP but was having episodes of low heart rate.  For rescue rizatriptan and ibuprofen have been effective if taken early.      Topamax was added to her regimen; however, she had significant side effects of speech difficulty, drowsiness, \"being out of it \". Given that she is unable to tolerate beta-blocker given her low heart rate concerns and d/t potential weight gain and sedative effects with amitriptyline (needs to be able to get up with her one-year-old at night) she was started on Aimovig 70 mg/ml.    She reports significant improvement since starting Aimovig, the first two weeks following the initial injection she was nearly headache free.  Over the last 2 weeks she reports having 3 migraines and about 4-5 \"regular\" headaches.  She continues to use rizatriptan, also sometimes with Aleve with good improvement.  She continues on mag and riboflavin.  She is also increased her water which she feels is helping.  She denies any changes in her health since her last visit.      Review of Systems:Review of Systems   Constitutional: Negative for activity change, appetite change and fatigue.   HENT: Negative for ear pain, tinnitus and trouble swallowing.    Eyes: Negative for photophobia, pain and visual disturbance.   Musculoskeletal: Positive for back pain. Negative for gait problem and neck pain.   Neurological: " Positive for headaches. Negative for dizziness, tremors, seizures, syncope, facial asymmetry, speech difficulty, weakness, light-headedness and numbness.   Psychiatric/Behavioral: Positive for sleep disturbance. Negative for agitation, behavioral problems, confusion, decreased concentration, dysphoric mood, hallucinations, self-injury and suicidal ideas. The patient is not nervous/anxious and is not hyperactive.     Above ROS reviewed    The following portions of the patient's history were reviewed and updated as appropriate: allergies, current medications, past family history, past medical history, past social history, past surgical history and problem list.    Current Medications:   Current Outpatient Medications:   •  Ascorbic Acid (VITAMIN C PO), Take  by mouth., Disp: , Rfl:   •  coenzyme Q10 100 MG capsule, Take 100 mg by mouth Daily., Disp: , Rfl:   •  Cyanocobalamin (B-12) 1000 MCG tablet, , Disp: , Rfl:   •  Magnesium Oxide (Mag-Oxide) 200 MG tablet, , Disp: , Rfl:   •  Omega-3 Fatty Acids (FISH OIL) 1000 MG capsule capsule, Take  by mouth Daily With Breakfast., Disp: , Rfl:   •  Riboflavin 100 MG capsule, Take  by mouth., Disp: , Rfl:   •  rizatriptan (MAXALT) 10 MG tablet, Take 10 mg by mouth., Disp: , Rfl:   •  VITAMIN D PO, Take  by mouth., Disp: , Rfl:   •  Erenumab-aooe (Aimovig) 70 MG/ML prefilled syringe, Inject 1 mL under the skin into the appropriate area as directed 1 (One) Time for 1 dose., Disp: 1 mL, Rfl: 6  •  ferric gluconate in sodium chloride 0.9 % 100 mL IVPB, Infuse  into a venous catheter 1 (One) Time., Disp: , Rfl:   •  topiramate (Topamax) 25 MG tablet, Take 1 tablet by mouth Every Night., Disp: 30 tablet, Rfl: 0  **I did not stop or change the above medications.  Patient's medication list was updated to reflect medications they have reported as currently taking, including medication changes made by other providers.    OBJECTIVE  Vitals:    09/30/20 0723   BP: 110/72   Pulse: 69    SpO2: 98%     Body mass index is 29.39 kg/m².    Diagnostics:    Laboratory Results:         Lab Results   Component Value Date    WBC 4.22 06/24/2020    HGB 12.6 06/24/2020    HCT 37.4 06/24/2020    MCV 89.9 06/24/2020     06/24/2020     Lab Results   Component Value Date    BUN 9 06/24/2020    CREATININE 0.67 06/24/2020    EGFRIFNONA 100 06/24/2020    EGFRIFAFRI 121 06/24/2020    BCR 13.4 06/24/2020    K 4.9 06/24/2020    CO2 23.9 06/24/2020    CALCIUM 9.3 06/24/2020    PROTENTOTREF 7.0 06/24/2020    ALBUMIN 4.40 06/24/2020    LABIL2 1.7 06/24/2020    AST 10 06/24/2020    ALT 7 06/24/2020     No results found for: HGBA1C  No results found for: CHOL  Lab Results   Component Value Date    HDL 43 06/24/2020     Lab Results   Component Value Date     (H) 06/24/2020     Lab Results   Component Value Date    TRIG 111 06/24/2020     Lab Results   Component Value Date    RPR Non-Reactive 02/10/2016     Lab Results   Component Value Date    TSH 1.630 06/24/2020     Lab Results   Component Value Date    [id number] 279 07/06/2020     Physical Exam:  GENERAL: NAD  HEENT: Normocephalic, atraumatic   COR: RRR  Resp: Even and unlabored  Extremities: Equal pulses, no signs of distal embolization.   Skin: No rashes, lesions or ulcers.  Psychiatric: Normal mood and affect.    Neurological:   MS: AO. Language normal. No neglect. Follows all commands.  CN: II-XII grossly normal  Motor: Normal strength and tone throughout.  Sensory: Intact to light touch in arms and legs  Station and Gait: Normal gait and station.    Coordination: Normal finger to nose bilaterally    Impression:  Ms. Cobb presents for follow-up of migraines with aura, recently failed Topamax. She had an initial trial sample of Aimovig given in the officewith good results except for some increased frequency of migraines in the last 2 weeks. For rescue she continues to use rizatriptan and ibuprofen which continue to be effective.  Recommend she also  continue to use her mag and riboflavin daily.  She will follow-up in 6 months, sooner if symptoms warrant.    Plan:     1. Chronic migraine with aura  Aimovig 70 mg/ml monthly, consider increasing to 140 mg/ML if increased frequency  Continue mag 500 mg, riboflavin 400 mg daily  For rescue continue rizatriptan, ibuprofen --again discussed importance of using sparingly to avoid medication overuse headache  Lifestyle modifications as noted below  F/U in 6 months    Lifestyle modifications for headache prevention:  Recommendations:   -Increase water intake, decrease caffeine  -Get plenty of rest, practice good sleep hygiene -- Consider sleep evaluation for HILDA if indicated  -Maintain a stable daily schedule (going to sleep and waking up the same time each day; eating regular meals; maintaining a low stress lifestyle)  -Get regular physical activity  -Minimize OTC use to avoid medication overuse headaches      Stephanie was seen today for migraine.    Diagnoses and all orders for this visit:    Migraine with aura and without status migrainosus, not intractable    Other orders  -     Erenumab-aooe (Aimovig) 70 MG/ML prefilled syringe; Inject 1 mL under the skin into the appropriate area as directed 1 (One) Time for 1 dose.        Coding      Dictated using Dragon

## 2020-09-30 NOTE — PATIENT INSTRUCTIONS
Lifestyle modifications for headache prevention:  Recommendations:   -Increase water intake, decrease caffeine  -Get plenty of rest, practice good sleep hygiene -- Consider sleep evaluation for HILDA if indicated  -Maintain a stable daily schedule (going to sleep and waking up the same time each day; eating regular meals; maintaining a low stress lifestyle)  -Get regular physical activity  -Minimize OTC use to avoid medication overuse headaches

## 2020-12-04 ENCOUNTER — PROCEDURE VISIT (OUTPATIENT)
Dept: OBSTETRICS AND GYNECOLOGY | Age: 35
End: 2020-12-04

## 2020-12-04 ENCOUNTER — TELEPHONE (OUTPATIENT)
Dept: OBSTETRICS AND GYNECOLOGY | Age: 35
End: 2020-12-04

## 2020-12-04 ENCOUNTER — OFFICE VISIT (OUTPATIENT)
Dept: OBSTETRICS AND GYNECOLOGY | Age: 35
End: 2020-12-04

## 2020-12-04 VITALS
HEIGHT: 66 IN | BODY MASS INDEX: 28.93 KG/M2 | DIASTOLIC BLOOD PRESSURE: 70 MMHG | WEIGHT: 180 LBS | SYSTOLIC BLOOD PRESSURE: 122 MMHG

## 2020-12-04 DIAGNOSIS — R10.32 LEFT LOWER QUADRANT ABDOMINAL PAIN: Primary | ICD-10-CM

## 2020-12-04 DIAGNOSIS — R10.2 PELVIC PAIN: Primary | ICD-10-CM

## 2020-12-04 PROCEDURE — 99213 OFFICE O/P EST LOW 20 MIN: CPT | Performed by: NURSE PRACTITIONER

## 2020-12-04 PROCEDURE — 76830 TRANSVAGINAL US NON-OB: CPT | Performed by: NURSE PRACTITIONER

## 2020-12-04 NOTE — PROGRESS NOTES
"Amberly Cobb is a 35 y.o. female is being seen today for   Chief Complaint   Patient presents with   • Abdominal Pain     c/o abdominal pain   .    History of Present Illness     Patient called today with some left lower abdominal pain that was radiating to her back  It started 3 nights ago  States it was mild but enough that she noticed it   She has had an ovarian cyst in the past and it seemed similar  She had to have surgery 2 years ago for a large ovarian cyst   Also notes some loose stools a few days ago and no bowel movement since  No urinary urgency, frequency or dysuria  No nausea, vomiting or fevers  She is having some mild cramping as well and should start her cycle this week  She has not tried any medications for the pain  No pain currently and at its worst would have only rated it 3/10        The following portions of the patient's history were reviewed and updated as appropriate: allergies, current medications, past family history, past medical history, past social history, past surgical history and problem list.    /70   Ht 167.6 cm (66\")   Wt 81.6 kg (180 lb)   LMP 11/07/2020 (Exact Date)   Breastfeeding No   BMI 29.05 kg/m²         Review of Systems   Constitutional: Negative.    HENT: Negative.    Eyes: Negative.    Respiratory: Negative.    Cardiovascular: Negative.    Gastrointestinal: Positive for abdominal pain, constipation and diarrhea. Negative for nausea and vomiting.   Endocrine: Negative.    Genitourinary: Positive for pelvic pain. Negative for difficulty urinating, dyspareunia, dysuria and menstrual problem.   Musculoskeletal: Positive for back pain.   Skin: Negative.    Allergic/Immunologic: Negative.    Neurological: Negative.    Hematological: Negative.    Psychiatric/Behavioral: Negative.        Objective   Physical Exam  Constitutional:       Appearance: She is well-developed.   Abdominal:      General: Bowel sounds are absent. There is no distension.      " Palpations: Abdomen is soft. There is no mass.      Tenderness: There is no abdominal tenderness. There is no guarding or rebound.   Genitourinary:     Vagina: Normal.      Cervix: No cervical motion tenderness, discharge or friability.      Uterus: Not tender.    Skin:     General: Skin is warm and dry.   Neurological:      Mental Status: She is alert and oriented to person, place, and time.           Assessment/Plan   Diagnoses and all orders for this visit:    1. Left lower quadrant abdominal pain (Primary)      Ultrasound done, uterus normal and small bilateral ovarian follicles noted.  No cysts or masses.    Bowel sounds are decreased, discussed increasing fiber and water and miralax as needed for regular bowel movements  I do not see any reason for her discomfort on ultrasound today  Continue to monitor and advised NSAIDs and low heat as needed.  If pain increases or she develops additional concerns she should go to the ER for evaluation

## 2020-12-04 NOTE — TELEPHONE ENCOUNTER
(Link Pt)       Pt called stating she is having lower abdominal pain that is radiating towards back. Pt states the pain is stronger on the left side where she had a cyst in the past. Pt is having some slight odor, no discharge, no bleeding. Pt is wanting to be seen today.     Please advise    [phone]

## 2020-12-16 RX ORDER — RIZATRIPTAN BENZOATE 10 MG/1
10 TABLET ORAL AS NEEDED
Qty: 10 TABLET | Refills: 3 | Status: SHIPPED | OUTPATIENT
Start: 2020-12-16 | End: 2021-05-16 | Stop reason: SDUPTHER

## 2021-01-12 ENCOUNTER — E-VISIT (OUTPATIENT)
Dept: FAMILY MEDICINE CLINIC | Facility: TELEHEALTH | Age: 36
End: 2021-01-12

## 2021-01-12 DIAGNOSIS — N89.8 VAGINAL DISCHARGE: ICD-10-CM

## 2021-01-12 DIAGNOSIS — N89.8 VAGINAL ITCHING: Primary | ICD-10-CM

## 2021-01-12 PROCEDURE — 99422 OL DIG E/M SVC 11-20 MIN: CPT | Performed by: NURSE PRACTITIONER

## 2021-01-12 RX ORDER — FLUCONAZOLE 150 MG/1
150 TABLET ORAL
Qty: 2 TABLET | Refills: 0 | Status: SHIPPED | OUTPATIENT
Start: 2021-01-12 | End: 2021-11-24 | Stop reason: HOSPADM

## 2021-01-12 NOTE — PROGRESS NOTES
I have reviewed the e-Visit questionnaire and patient's answers, and my assessment and plan are as follows:    HPI  Stephanie Cobb is a 35 y.o. female  presents with complaint of vulva itching and white/milky vaginal discharge for 3 weeks. Did not report trying any OTC medications. Denies any dysuria, pregnancy, breastfeeding, recent antibiotics, or genital sores.     Review of Systems - Negative except those listed in the HPI.      Diagnoses and all orders for this visit:    1. Vaginal itching (Primary)  -     fluconazole (Diflucan) 150 MG tablet; Take 1 tablet by mouth Every 72 (Seventy-Two) Hours As Needed (yeast).  Dispense: 2 tablet; Refill: 0    2. Vaginal discharge  -     fluconazole (Diflucan) 150 MG tablet; Take 1 tablet by mouth Every 72 (Seventy-Two) Hours As Needed (yeast).  Dispense: 2 tablet; Refill: 0          FOLLOW-UP  If symptoms worsen or fail to improve, follow-up with PCP/Urgent Care/Emergency Department.      Time Documentation  Counseled patient  Counseling topics: diagnosis, treatment options and follow up plan  Total encounter time: counseling time more than 50% of visit: 15 minutes        Nataliya Mullen, APRN  01/12/21  16:40 EST

## 2021-02-10 ENCOUNTER — TELEPHONE (OUTPATIENT)
Dept: NEUROLOGY | Facility: CLINIC | Age: 36
End: 2021-02-10

## 2021-02-10 NOTE — TELEPHONE ENCOUNTER
----- Message from KAHLIL Richmond sent at 2/10/2021  8:11 AM EST -----  This patient wants to come in for an earlier appointment because she is having worsening headaches, can we work her in somewhere for sometime in Feb, maybe next week. Thanks.

## 2021-02-24 ENCOUNTER — OFFICE VISIT (OUTPATIENT)
Dept: NEUROLOGY | Facility: CLINIC | Age: 36
End: 2021-02-24

## 2021-02-24 VITALS
WEIGHT: 182 LBS | HEIGHT: 66 IN | OXYGEN SATURATION: 100 % | SYSTOLIC BLOOD PRESSURE: 122 MMHG | DIASTOLIC BLOOD PRESSURE: 70 MMHG | BODY MASS INDEX: 29.25 KG/M2 | HEART RATE: 69 BPM

## 2021-02-24 DIAGNOSIS — G43.109 MIGRAINE WITH AURA AND WITHOUT STATUS MIGRAINOSUS, NOT INTRACTABLE: Primary | ICD-10-CM

## 2021-02-24 PROBLEM — F43.9 STRESS: Status: ACTIVE | Noted: 2021-02-24

## 2021-02-24 PROCEDURE — 99215 OFFICE O/P EST HI 40 MIN: CPT | Performed by: NURSE PRACTITIONER

## 2021-02-24 NOTE — PROGRESS NOTES
"DOS: 2021  NAME: Stephanie Cobb   : 1985  PCP: Mary Kuhn APRN    Chief Complaint   Patient presents with   • Migraine      SUBJECTIVE  Neurological Problem:  35 y.o. RHW female with chronic migraines, h/o fluctuating BP, HLD who presents today for migraines.  She is unaccompanied.    Interval History:   **For previous history, please see progress note dated 2020.     Ms. Cobb has h/o migraines, worsening in frequency and therefore was referred to neurology. She was initially seen in 2020 and for prevention was on Mag-Ox, riboflavin, co-Q10.  She was also on propanolol previously for BP but was having episodes of low heart rate.  For rescue rizatriptan and ibuprofen have been effective if taken early.  Topamax was added to her regimen; however, she had significant side effects. She declined amitriptyline secondary to sedating effects and weight gain. She was started on Aimovig 70 mg/ml and advised to increase her water intake.     She was last seen in the office in 2020 and reported significant improvement since starting Aimovig, the first two weeks following the initial injection she was nearly headache free.  Over the last 2 weeks she reports having 3 migraines and about 4-5 \"regular\" headaches.  She continues to use rizatriptan, also sometimes with Aleve with good improvement.  She continues on mag and riboflavin.     She presents today, continues Aimovig 70 mg, mag, riboflavin for prevention and Maxalt, ibuprofen for rescue. The Aimovig was initially helpful but she feels that she started getting more headaches starting in Dec/.  They seem to come in two week segments where she goes fairly headache-free but then a couple weeks with daily headaches. They have also changed in characteristic, now some on the left and also coming from the neck. She does have neck pain on occasion. She notices a couple days prior \"feeling bad\".  There is no correlation to her menstrual cycle. " "Also some increase in \"floaters\", difficulty looking at lined things like vent or shirt that is combined. She denies any vision loss, double vision. No other associated neurologic changes. She will need her rescue possibly 2-3 times per week during the weeks that this occurs. She does endorse increased stress. No other changes in her health since her last visit.  Lab work from October 2020 shows a CMP, CBC that are unremarkable; lipid panel with a total of 192, HDL 42, , TG 96; B12 385; vitamin D 27, is currently on replacement.   No smoking. Rare alcohol. No falls.     Review of Systems:Review of Systems   Constitutional: Positive for fatigue. Negative for activity change and appetite change.   Eyes: Positive for pain and itching. Negative for redness.   Respiratory: Negative for cough, choking and shortness of breath.    Cardiovascular: Negative for chest pain and leg swelling.   Neurological: Positive for headaches. Negative for dizziness, tremors, seizures, syncope, facial asymmetry, speech difficulty, weakness, light-headedness and numbness.   Psychiatric/Behavioral: Positive for sleep disturbance. Negative for agitation, behavioral problems, confusion, decreased concentration, dysphoric mood, hallucinations, self-injury and suicidal ideas. The patient is not nervous/anxious and is not hyperactive.     Above ROS reviewed    The following portions of the patient's history were reviewed and updated as appropriate: allergies, current medications, past family history, past medical history, past social history, past surgical history and problem list.    Current Medications:   Current Outpatient Medications:   •  Ascorbic Acid (VITAMIN C PO), Take  by mouth., Disp: , Rfl:   •  coenzyme Q10 100 MG capsule, Take 100 mg by mouth Daily., Disp: , Rfl:   •  Cyanocobalamin (B-12) 1000 MCG tablet, , Disp: , Rfl:   •  ferric gluconate in sodium chloride 0.9 % 100 mL IVPB, Infuse  into a venous catheter 1 (One) Time., " Disp: , Rfl:   •  Magnesium Oxide (Mag-Oxide) 200 MG tablet, , Disp: , Rfl:   •  Omega-3 Fatty Acids (FISH OIL) 1000 MG capsule capsule, Take  by mouth Daily With Breakfast., Disp: , Rfl:   •  Riboflavin 100 MG capsule, Take  by mouth., Disp: , Rfl:   •  rizatriptan (MAXALT) 10 MG tablet, Take 1 tablet by mouth As Needed for Migraine. Okay to repeat one time for persistent migraine. No more than 2 tabs in 24 hrs, Disp: 10 tablet, Rfl: 3  •  VITAMIN D PO, Take  by mouth., Disp: , Rfl:   •  fluconazole (Diflucan) 150 MG tablet, Take 1 tablet by mouth Every 72 (Seventy-Two) Hours As Needed (yeast)., Disp: 2 tablet, Rfl: 0  **I did not stop or change the above medications.  Patient's medication list was updated to reflect medications they have reported as currently taking, including medication changes made by other providers.    OBJECTIVE  Vitals:    02/24/21 0728   BP: 122/70   Pulse: 69   SpO2: 100%     Body mass index is 29.38 kg/m².    Diagnostics:    Laboratory Results:         Lab Results   Component Value Date    WBC 5.26 10/05/2020    HGB 12.9 10/05/2020    HCT 40.9 10/05/2020    MCV 94.7 10/05/2020     10/05/2020     Lab Results   Component Value Date    BUN 11 10/05/2020    CREATININE 0.6 (L) 10/05/2020    EGFRIFNONA 100 06/24/2020    EGFRIFAFRI 121 06/24/2020    BCR 20.0 10/05/2020    K 4.9 10/05/2020    CO2 25 10/05/2020    CALCIUM 9.2 10/05/2020    PROTENTOTREF 7.0 06/24/2020    ALBUMIN 4.5 10/05/2020    LABIL2 1.6 10/05/2020    AST 15 10/05/2020    ALT 11 (L) 10/05/2020     No results found for: HGBA1C  No results found for: CHOL  Lab Results   Component Value Date    HDL 42 (L) 10/05/2020    HDL 43 06/24/2020     Lab Results   Component Value Date     (H) 10/05/2020     (H) 06/24/2020     Lab Results   Component Value Date    TRIG 96 10/05/2020    TRIG 111 06/24/2020     Lab Results   Component Value Date    RPR Non-Reactive 02/10/2016     Lab Results   Component Value Date    TSH  1.630 06/24/2020     Lab Results   Component Value Date    [id number] 385 10/05/2020       Physical Exam:  GENERAL: NAD  HEENT: Normocephalic, atraumatic   COR: RRR  Resp: Even and unlabored  Extremities: No signs of distal embolization.   Skin: No rashes, lesions or ulcers.  Psychiatric: Normal mood and affect.    Neurological:   MS: AO. Language normal. No neglect. Follows all commands.  CN: II-XII grossly normal  Motor: Normal strength and tone throughout.  Sensory: Intact to light touch in arms and legs  Station and Gait: Normal gait and station.    Coordination: Normal finger to nose bilaterally    Impression/Plan:  Ms. Cobb presents for follow-up migraine with aura some worsening despite regimen of Aimovig 70 mg/ML, mag, riboflavin for prevention and Maxalt/ibuprofen for abortive therapy.  We will increase Aimovig to 140 mg/mL monthly, she has Medicare and will be requesting this through her pharmacy, and send us appropriate paperwork.  I have given her 1 sample today.  I also given her samples of Nurtec for abortive treatment to try and lieu of triptan, instructed on proper use, possible SEs.  Continue daily mag and riboflavin.  We again discussed the importance of lifestyle modifications including increasing her water intake.  She will follow-up here once, sooner symptoms warrant.      I spent a total of 40 minutes today in reviewing records, prior diagnostics, examination of patient as well as counseling and educating patient regarding diagnoses, symptoms, reviewing diagnostics, pharmacologic treatment options, recommendations, lifestyle modifications, coordination of care and documenting plan of care.    There are no diagnoses linked to this encounter.    Coding      Dictated using Dragon

## 2021-03-24 NOTE — TELEPHONE ENCOUNTER
Please approve. It will print because I need to fax it to the assistance program that patient gets her medicine through.     Thank you.

## 2021-04-16 ENCOUNTER — BULK ORDERING (OUTPATIENT)
Dept: CASE MANAGEMENT | Facility: OTHER | Age: 36
End: 2021-04-16

## 2021-04-16 DIAGNOSIS — Z23 IMMUNIZATION DUE: ICD-10-CM

## 2021-05-17 RX ORDER — RIZATRIPTAN BENZOATE 10 MG/1
10 TABLET ORAL AS NEEDED
Qty: 10 TABLET | Refills: 3 | Status: SHIPPED | OUTPATIENT
Start: 2021-05-17 | End: 2021-09-17

## 2021-05-17 NOTE — TELEPHONE ENCOUNTER
Pt last seen 2/24/2021. Follow up scheduled 5/26/2021.       Quantity 10 for 30 days.      Please review and approve or advise.     Thank you.

## 2021-05-26 ENCOUNTER — OFFICE VISIT (OUTPATIENT)
Dept: NEUROLOGY | Facility: CLINIC | Age: 36
End: 2021-05-26

## 2021-05-26 VITALS
SYSTOLIC BLOOD PRESSURE: 110 MMHG | OXYGEN SATURATION: 98 % | BODY MASS INDEX: 29.25 KG/M2 | DIASTOLIC BLOOD PRESSURE: 66 MMHG | WEIGHT: 182 LBS | HEART RATE: 74 BPM | HEIGHT: 66 IN

## 2021-05-26 DIAGNOSIS — G43.109 MIGRAINE WITH AURA AND WITHOUT STATUS MIGRAINOSUS, NOT INTRACTABLE: Primary | ICD-10-CM

## 2021-05-26 PROCEDURE — 99213 OFFICE O/P EST LOW 20 MIN: CPT | Performed by: NURSE PRACTITIONER

## 2021-05-26 NOTE — PROGRESS NOTES
DOS: 2021  NAME: Stephanie Cobb   : 1985  PCP: Mary Kuhn APRN    Chief Complaint   Patient presents with   • Migraine      SUBJECTIVE  Neurological Problem:  35 y.o. RHW female with chronic migraines, h/o fluctuating BP, HLD who presents today for migraines.  She is unaccompanied.    Interval History:   **For previous history, please see progress note dated 2020.    Ms. Cobb has a h/o chronic migraine with aura, last seen in 2021 with some worsening despite regimen of Aimovig 70 mg/ML, mag, riboflavin for prevention and Maxalt/ibuprofen for abortive therapy.  Her Aimovig was increased to 140 mg/mL monthly.   Aso given samples of Nurtec for abortive treatment to try and lieu of triptan. Recommended to continue daily mag and riboflavin.      She presents today, doing well on her current regimen; however, Nurtec not particularly helpful when she tried it.  She reports having about 8-10 headaches in the last 30 days, 6 of these migrainous types requiring rescue treatment.  She did have Covid this past March, lost taste and smell, no sequela of issues following this.  No worsening of headaches.  Review of labs from 2021 shows a unremarkable CMP, CBC; TSH 1.21; iron studies normal.  She denies any other changes in her health since her last visit.     Current preventive therapy: Aimovig 140 mg/ml  Current abortive treatment: Maxalt 10 mg  Failed medications: Nurtec  Headaches in past month: 8-10 headaches, 6 migraine type.        Review of Systems:Review of Systems   Constitutional: Positive for fatigue. Negative for chills and fever.   HENT: Negative for hearing loss, tinnitus and trouble swallowing.    Eyes: Positive for photophobia and visual disturbance. Negative for pain.   Respiratory: Negative for cough, shortness of breath and wheezing.    Cardiovascular: Negative for chest pain, palpitations and leg swelling.   Gastrointestinal: Positive for nausea. Negative for diarrhea and  vomiting.   Endocrine: Negative for cold intolerance, heat intolerance and polydipsia.   Genitourinary: Negative for decreased urine volume, difficulty urinating and urgency.   Musculoskeletal: Positive for neck pain and neck stiffness. Negative for back pain.   Skin: Negative for color change, rash and wound.   Allergic/Immunologic: Negative for environmental allergies, food allergies and immunocompromised state.   Neurological: Positive for headaches. Negative for dizziness, tremors, seizures, syncope, facial asymmetry, speech difficulty, light-headedness and numbness.   Hematological: Negative for adenopathy. Does not bruise/bleed easily.   Psychiatric/Behavioral: Negative for confusion and sleep disturbance. The patient is not nervous/anxious.     Above ROS reviewed    The following portions of the patient's history were reviewed and updated as appropriate: allergies, current medications, past family history, past medical history, past social history, past surgical history and problem list.    Current Medications:   Current Outpatient Medications:   •  Ascorbic Acid (VITAMIN C PO), Take  by mouth., Disp: , Rfl:   •  coenzyme Q10 100 MG capsule, Take 100 mg by mouth Daily., Disp: , Rfl:   •  Cyanocobalamin (B-12) 1000 MCG tablet, , Disp: , Rfl:   •  Erenumab-aooe (AIMOVIG, 140 MG DOSE, SC), Inject  under the skin into the appropriate area as directed., Disp: , Rfl:   •  ferric gluconate in sodium chloride 0.9 % 100 mL IVPB, Infuse  into a venous catheter 1 (One) Time., Disp: , Rfl:   •  fluconazole (Diflucan) 150 MG tablet, Take 1 tablet by mouth Every 72 (Seventy-Two) Hours As Needed (yeast)., Disp: 2 tablet, Rfl: 0  •  Magnesium Oxide (Mag-Oxide) 200 MG tablet, , Disp: , Rfl:   •  Omega-3 Fatty Acids (FISH OIL) 1000 MG capsule capsule, Take  by mouth Daily With Breakfast., Disp: , Rfl:   •  Riboflavin 100 MG capsule, Take  by mouth., Disp: , Rfl:   •  rizatriptan (MAXALT) 10 MG tablet, Take 1 tablet by mouth As  Needed for Migraine. Okay to repeat one time for persistent migraine. No more than 2 tabs in 24 hrs, Disp: 10 tablet, Rfl: 3  •  VITAMIN D PO, Take  by mouth., Disp: , Rfl:   **I did not stop or change the above medications.  Patient's medication list was updated to reflect medications they have reported as currently taking, including medication changes made by other providers.    OBJECTIVE  Vitals:    05/26/21 0731   BP: 110/66   Pulse: 74   SpO2: 98%     Body mass index is 29.38 kg/m².    Diagnostics:    Laboratory Results:         Lab Results   Component Value Date    WBC 3.84 (L) 04/01/2021    HGB 12.8 04/01/2021    HCT 40.4 04/01/2021    MCV 94.6 04/01/2021     04/01/2021     Lab Results   Component Value Date    BUN 11 04/01/2021    CREATININE 0.6 (L) 04/01/2021    EGFRIFNONA 100 06/24/2020    EGFRIFAFRI 121 06/24/2020    BCR 20.0 04/01/2021    K 4.7 04/01/2021    CO2 24 04/01/2021    CALCIUM 9.3 04/01/2021    PROTENTOTREF 7.0 06/24/2020    ALBUMIN 4.5 04/01/2021    LABIL2 1.5 04/01/2021    AST 18 04/01/2021    ALT 14 04/01/2021     No results found for: HGBA1C  No results found for: CHOL  Lab Results   Component Value Date    HDL 42 (L) 10/05/2020    HDL 43 06/24/2020     Lab Results   Component Value Date     (H) 10/05/2020     (H) 06/24/2020     Lab Results   Component Value Date    TRIG 96 10/05/2020    TRIG 111 06/24/2020     Lab Results   Component Value Date    RPR Non-Reactive 02/10/2016     Lab Results   Component Value Date    TSH 1.210 04/01/2021     Lab Results   Component Value Date    [id number] 385 10/05/2020       Physical Exam:  GENERAL: NAD  HEENT: Normocephalic, atraumatic   COR: RRR  Resp: Even and unlabored  Extremities: No signs of distal embolization.   Skin: No rashes, lesions or ulcers.  Psychiatric: Normal mood and affect.    Neurological:   MS: AO. Language normal. No neglect. Follows all commands.  CN: II-XII grossly normal  Motor: Normal strength and tone  throughout.  Sensory: Intact to light touch in arms and legs  Station and Gait: Normal gait and station.    Coordination: Normal finger to nose bilaterally    Impression/Plan:  Ms. Cobb presents for f/u of migraine with aura, doing well on current regimen noted above, will continue the same.  He has Nurtec samples were not helpful, will have given Ubrelvy samples today, instructed on proper use, possible SEs.  Continue Aimovig for prevention, Maxalt 10 mg for rescue.  We again discussed important lifestyle modifications, increasing water, avoiding triggers.  She will follow-up here in 6 months, sooner symptoms warrant.      Lifestyle modifications for headache prevention:  -Increase water intake, decrease caffeine  -Get plenty of rest, practice good sleep hygiene -- Consider sleep evaluation for HILDA if indicated  -Maintain a stable daily schedule (going to sleep and waking up the same time each day; eating regular meals; maintaining a low stress lifestyle)  -Get regular physical activity  -Minimize OTC use to avoid medication overuse headaches            I spent a total of 20 minutes today in reviewing records, prior diagnostics, examination of patient as well as counseling and educating patient regarding diagnoses, symptoms, reviewing diagnostics with patient, pharmacologic treatment options including purpose, risk, benefits, possible side-effects, recommendations, lifestyle modifications, coordination of care and documenting plan of care.        There are no diagnoses linked to this encounter.    Coding      Dictated using Dragon

## 2021-07-06 PROBLEM — O09.529 ANTEPARTUM MULTIGRAVIDA OF ADVANCED MATERNAL AGE: Status: RESOLVED | Noted: 2018-12-19 | Resolved: 2021-07-06

## 2021-07-06 PROBLEM — Z34.90 PREGNANCY: Status: RESOLVED | Noted: 2019-05-02 | Resolved: 2021-07-06

## 2021-07-19 ENCOUNTER — OFFICE VISIT (OUTPATIENT)
Dept: OBSTETRICS AND GYNECOLOGY | Age: 36
End: 2021-07-19

## 2021-07-19 VITALS
SYSTOLIC BLOOD PRESSURE: 114 MMHG | BODY MASS INDEX: 29.57 KG/M2 | WEIGHT: 184 LBS | HEIGHT: 66 IN | DIASTOLIC BLOOD PRESSURE: 70 MMHG

## 2021-07-19 DIAGNOSIS — N76.3 SUBACUTE VULVITIS: ICD-10-CM

## 2021-07-19 DIAGNOSIS — Z01.419 ENCOUNTER FOR GYNECOLOGICAL EXAMINATION: Primary | ICD-10-CM

## 2021-07-19 PROCEDURE — 99395 PREV VISIT EST AGE 18-39: CPT | Performed by: OBSTETRICS & GYNECOLOGY

## 2021-07-19 RX ORDER — CLOTRIMAZOLE AND BETAMETHASONE DIPROPIONATE 10; .64 MG/G; MG/G
CREAM TOPICAL 2 TIMES DAILY
Qty: 45 G | Refills: 1 | Status: SHIPPED | OUTPATIENT
Start: 2021-07-19 | End: 2021-11-24 | Stop reason: HOSPADM

## 2021-07-22 LAB
CYTOLOGIST CVX/VAG CYTO: NORMAL
CYTOLOGY CVX/VAG DOC CYTO: NORMAL
CYTOLOGY CVX/VAG DOC THIN PREP: NORMAL
DX ICD CODE: NORMAL
HIV 1 & 2 AB SER-IMP: NORMAL
HPV I/H RISK 4 DNA CVX QL PROBE+SIG AMP: NEGATIVE
Lab: NORMAL
OTHER STN SPEC: NORMAL
STAT OF ADQ CVX/VAG CYTO-IMP: NORMAL

## 2021-09-17 RX ORDER — RIZATRIPTAN BENZOATE 10 MG/1
TABLET ORAL
Qty: 10 TABLET | Refills: 3 | Status: SHIPPED | OUTPATIENT
Start: 2021-09-17 | End: 2022-03-30

## 2021-09-17 NOTE — TELEPHONE ENCOUNTER
Pt last seen. Follow up scheduled.       Quantity 30 for 90 days.      Per LQ, continue Maxalt 10 mg for rescue.    Thank you.

## 2021-11-24 ENCOUNTER — OFFICE VISIT (OUTPATIENT)
Dept: NEUROLOGY | Facility: CLINIC | Age: 36
End: 2021-11-24

## 2021-11-24 VITALS
RESPIRATION RATE: 16 BRPM | WEIGHT: 170 LBS | DIASTOLIC BLOOD PRESSURE: 76 MMHG | OXYGEN SATURATION: 99 % | SYSTOLIC BLOOD PRESSURE: 120 MMHG | HEIGHT: 66 IN | HEART RATE: 80 BPM | BODY MASS INDEX: 27.32 KG/M2

## 2021-11-24 DIAGNOSIS — G43.109 MIGRAINE WITH AURA AND WITHOUT STATUS MIGRAINOSUS, NOT INTRACTABLE: Primary | ICD-10-CM

## 2021-11-24 DIAGNOSIS — H93.A9 PULSATILE TINNITUS: ICD-10-CM

## 2021-11-24 PROCEDURE — 99214 OFFICE O/P EST MOD 30 MIN: CPT | Performed by: NURSE PRACTITIONER

## 2021-11-24 RX ORDER — FEXOFENADINE HCL AND PSEUDOEPHEDRINE HCI 180; 240 MG/1; MG/1
1 TABLET, EXTENDED RELEASE ORAL DAILY
COMMUNITY
End: 2022-07-19

## 2021-11-24 RX ORDER — FLUTICASONE PROPIONATE 50 MCG
SPRAY, SUSPENSION (ML) NASAL
COMMUNITY
End: 2022-07-19

## 2021-11-24 NOTE — PROGRESS NOTES
"DOS: 2021  NAME: Stephanie Cobb   : 1985  PCP: Mary Kuhn APRN    Chief Complaint   Patient presents with   • Migraine      SUBJECTIVE  Neurological Problem:  36 y.o. RHW female with chronic migraines, h/o fluctuating BP, HLD who presents today for migraines.  She is unaccompanied.    Interval History:   **For previous history, please see progress note dated 2020.    Ms. Cobb has a h/o chronic migraine with aura, last seen in May. 2021 doing well on increased dose of Aimovig 140 mg/ml for prevention and Maxalt/ibuprofen for abortive therapy.   Nurtec samples not particularly helpful.    She presents today, continues to have good control of baseline migraines with above regimen; however, she reports an episode that was a little unusual for her with worsening ringing in her ears, headache more on the top of her head, BP was elevated, she did take maxalt x 2 with relief. She has baseline ringing in the ears that has been a long-time issue she describes as a \"high pitched scream\". Along with that she mentions \"whooshing\" sound that she thinks may be in both ears, has a \"pressure changing feeling\" when she walks into [facility], She also mentions some vision changes, blurry episodes, no vision loss or double vision. She has chronic dry eye, some days things are \"blurrier\", more halos around lights, some days there is more squinting.  She denies any family history of cerebral aneurysms.    Current preventive therapy: Aimovig 140 mg/ml   Current abortive treatment: Maxalt 10 mg  Failed medications: Nurtec    Review of Systems:Review of Systems   Constitutional: Negative for activity change, appetite change and fatigue.   HENT: Positive for tinnitus. Negative for ear pain and trouble swallowing.    Eyes: Positive for photophobia. Negative for pain and visual disturbance.   Musculoskeletal: Positive for neck pain. Negative for back pain and gait problem.   Neurological: Positive for light-headedness " and headaches. Negative for dizziness, tremors, seizures, syncope, facial asymmetry, speech difficulty, weakness and numbness.   Psychiatric/Behavioral: Negative for agitation, behavioral problems, confusion, decreased concentration, dysphoric mood, hallucinations, self-injury, sleep disturbance and suicidal ideas. The patient is not nervous/anxious and is not hyperactive.     Above ROS reviewed    The following portions of the patient's history were reviewed and updated as appropriate: allergies, current medications, past family history, past medical history, past social history, past surgical history and problem list.    Current Medications:   Current Outpatient Medications:   •  Ascorbic Acid (VITAMIN C PO), Take  by mouth., Disp: , Rfl:   •  Erenumab-aooe (AIMOVIG, 140 MG DOSE, SC), Inject  under the skin into the appropriate area as directed., Disp: , Rfl:   •  fexofenadine-pseudoephedrine (ALLEGRA-D 24) 180-240 MG per 24 hr tablet, Take 1 tablet by mouth Daily., Disp: , Rfl:   •  rizatriptan (MAXALT) 10 MG tablet, TAKE 1 TABLET BY MOUTH AS NEEDED FOR MIGRAINE MAY REPEAT ONE TIME FOR PERSISTENT MIGRAINE. NO MORE THAN 2 TABS PER 24 HOURS, Disp: 10 tablet, Rfl: 3  •  fluticasone (Flonase) 50 MCG/ACT nasal spray, Flonase Allergy Relief 50 mcg/actuation nasal spray,suspension  Spray 2 sprays every day by intranasal route., Disp: , Rfl:   **I did not stop or change the above medications.  Patient's medication list was updated to reflect medications they have reported as currently taking, including medication changes made by other providers.    OBJECTIVE  Vitals:    11/24/21 0730   BP: 120/76   Pulse: 80   Resp: 16   SpO2: 99%     Body mass index is 27.44 kg/m².    Diagnostics:    Laboratory Results:         Lab Results   Component Value Date    WBC 3.84 (L) 04/01/2021    HGB 12.8 04/01/2021    HCT 40.4 04/01/2021    MCV 94.6 04/01/2021     04/01/2021     Lab Results   Component Value Date    GLUCOSE 101 (H)  "06/24/2020    BUN 11 04/01/2021    CREATININE 0.6 (L) 04/01/2021    EGFRIFNONA 100 06/24/2020    EGFRIFAFRI 121 06/24/2020    BCR 20.0 04/01/2021    K 4.7 04/01/2021    CO2 24 04/01/2021    CALCIUM 9.3 04/01/2021    PROTENTOTREF 7.0 06/24/2020    ALBUMIN 4.5 04/01/2021    LABIL2 1.5 04/01/2021    AST 18 04/01/2021    ALT 14 04/01/2021     No results found for: HGBA1C  No results found for: CHOL  Lab Results   Component Value Date    HDL 42 (L) 10/05/2020    HDL 43 06/24/2020     Lab Results   Component Value Date     (H) 10/05/2020     (H) 06/24/2020     Lab Results   Component Value Date    TRIG 96 10/05/2020    TRIG 111 06/24/2020     Lab Results   Component Value Date    RPR Non-Reactive 02/10/2016     Lab Results   Component Value Date    TSH 1.210 04/01/2021     Lab Results   Component Value Date    [id number] 385 10/05/2020     Physical Exam:  GENERAL: NAD  HEENT: Normocephalic, atraumatic   COR: RRR  Resp: Even and unlabored  Extremities: No signs of distal embolization.   Skin: No rashes, lesions or ulcers.  Psychiatric: Normal mood and affect.    Neurological:   MS: AO. Language normal. No neglect. Follows all commands.  CN: II-XII grossly normal  Motor: Normal strength and tone throughout.  Sensory: Intact to light touch in arms and legs  Station and Gait: Normal gait and station.    Coordination: Normal finger to nose bilaterally    Impression/Plan: Ms. Cobb presents for f/u of migraine with aura, continues to do well on above regimen; however, she does note some new \"whooshing sounds \"in her ears that are different from her baseline chronic tinnitus as well as a headache episode that was different from her baseline headaches. Will check an MRA head to rule out aneurysm. She will f/u here in 3 months pending results of scan, sooner if symptoms worsen.       I spent a total of 30 minutes today in reviewing records, prior diagnostics, examination of patient as well as counseling and educating " patient regarding migraines, imaging, reviewing diagnostic recommendations with patient,  lifestyle modifications, coordination of care and documenting plan of care.        Diagnoses and all orders for this visit:    1. Migraine with aura and without status migrainosus, not intractable (Primary)  -     MRI Angiogram Head Without Contrast; Future    2. Pulsatile tinnitus  -     MRI Angiogram Head Without Contrast; Future        Coding      Dictated using Dragon

## 2021-12-03 ENCOUNTER — PATIENT MESSAGE (OUTPATIENT)
Dept: NEUROLOGY | Facility: CLINIC | Age: 36
End: 2021-12-03

## 2021-12-06 ENCOUNTER — TELEPHONE (OUTPATIENT)
Dept: NEUROLOGY | Facility: CLINIC | Age: 36
End: 2021-12-06

## 2021-12-06 NOTE — TELEPHONE ENCOUNTER
LM LETTING THEM KNOW THAT THERE IS A SCHEDULING NOTE IN THE REFERRAL STATING THAT SCAN IS TO BE SCHEDULED NEXT AVAIL AND NOT IN APRIL

## 2021-12-06 NOTE — TELEPHONE ENCOUNTER
"BLANQUITA LAWRENCE    SELF    [phone]    ORDER FOR MRI SAYS \"EXPECTED DATE 4-22-22\". CENTRAL SCHEDULING TOLD PATIENT TO CALL AND SEE IF THIS WAS INTENTIONAL OR AN ERROR? PLEASE ADVISE  "

## 2022-01-05 ENCOUNTER — HOSPITAL ENCOUNTER (OUTPATIENT)
Dept: MRI IMAGING | Facility: HOSPITAL | Age: 37
Discharge: HOME | End: 2022-01-05
Admitting: NURSE PRACTITIONER

## 2022-01-05 DIAGNOSIS — G43.109 MIGRAINE WITH AURA AND WITHOUT STATUS MIGRAINOSUS, NOT INTRACTABLE: ICD-10-CM

## 2022-01-05 DIAGNOSIS — H93.A9 PULSATILE TINNITUS: ICD-10-CM

## 2022-01-05 PROCEDURE — 70544 MR ANGIOGRAPHY HEAD W/O DYE: CPT

## 2022-01-13 ENCOUNTER — TELEPHONE (OUTPATIENT)
Dept: NEUROLOGY | Facility: CLINIC | Age: 37
End: 2022-01-13

## 2022-01-13 NOTE — TELEPHONE ENCOUNTER
Pt returned my call. I informed her of Huma's message in regards to the MRA (she verbalized understanding). She will also reach out to [employer] about showing prove of income.

## 2022-01-13 NOTE — TELEPHONE ENCOUNTER
ADANM for pt to call office. I need to inform her of Huma's message.     I also need to inform her that [employer] sent a fax for her Aimovig assistance application stating its pending due to proof of each household members gross income for the most recent year.

## 2022-01-13 NOTE — TELEPHONE ENCOUNTER
----- Message from KAHLIL Richmond sent at 1/12/2022  2:41 PM EST -----  Can you let patient know that her recent MRA head showed no evidence of aneurysms, no structural abnormalities to explain the whooshing in her ear. We will go over test results in more detail at her upcoming appointment. Thanks  LQ

## 2022-03-09 ENCOUNTER — OFFICE VISIT (OUTPATIENT)
Dept: NEUROLOGY | Facility: CLINIC | Age: 37
End: 2022-03-09

## 2022-03-09 VITALS
WEIGHT: 176 LBS | HEIGHT: 66 IN | BODY MASS INDEX: 28.28 KG/M2 | OXYGEN SATURATION: 98 % | SYSTOLIC BLOOD PRESSURE: 130 MMHG | DIASTOLIC BLOOD PRESSURE: 88 MMHG | HEART RATE: 80 BPM

## 2022-03-09 DIAGNOSIS — G43.109 MIGRAINE WITH AURA AND WITHOUT STATUS MIGRAINOSUS, NOT INTRACTABLE: Primary | ICD-10-CM

## 2022-03-09 DIAGNOSIS — H93.A9 PULSATILE TINNITUS: ICD-10-CM

## 2022-03-09 DIAGNOSIS — R03.0 ELEVATED BP WITHOUT DIAGNOSIS OF HYPERTENSION: ICD-10-CM

## 2022-03-09 PROCEDURE — 99214 OFFICE O/P EST MOD 30 MIN: CPT | Performed by: NURSE PRACTITIONER

## 2022-03-09 NOTE — PROGRESS NOTES
DOS: 3/9/2022  NAME: Stephanie Cobb   : 1985  PCP: Mary Kuhn APRN    Chief Complaint   Patient presents with   • Migraine      SUBJECTIVE  Neurological Problem:  36 y.o.  RHW female with chronic migraines, h/o fluctuating BP, HLD and pulsatile tinnitus  who presents today for migraines.  She is unaccompanied.    Interval History:   **For previous history, please see progress note dated 2020.    Ms. Cobb has a h/o chronic migraine with aura doing well on increased dose of Aimovig 140 mg/ml for prevention and Maxalt/ibuprofen for abortive therapy.  She was last seen by me in 2021, had some complaints of worsening ringing in her ears, headache more on top of her head.  No family history of aneurysms; however an MRA of the head was ordered, done on 2021, reviewed and showed no evidence of aneurysms or stenosis.    She presents today, continues on Aimovig 140 mg/mL for preventive therapy and Maxalt 10 mg as needed for rescue.  She unfortunately had some issues getting her Aimovig for a couple months, noticed that her headaches may have been worse but also increased nausea.  She is now back on injections and noticed that this improved her nausea.  Over the last month she had about 5-7 headaches with one being debilitating, which she attributes to be being behind in her Aimovig injections.  He does continue with intermittent tinnitus-like sounds, denies any hearing loss.  She denies any other changes in her health since her last visit.  BP today is somewhat elevated compared to previous.  She does have a history of fluctuations.    Current preventive therapy: Aimovig 140 mg/ml   Current abortive treatment: Maxalt 10 mg  [facility]    Review of Systems:Review of Systems   Musculoskeletal: Negative for gait problem.   Neurological: Negative for dizziness, tremors, seizures, syncope, facial asymmetry, speech difficulty, weakness, light-headedness, numbness and headaches.    Psychiatric/Behavioral: Negative for agitation, behavioral problems, confusion, decreased concentration, dysphoric mood, hallucinations, self-injury, sleep disturbance and suicidal ideas. The patient is not nervous/anxious and is not hyperactive.     Above ROS reviewed    The following portions of the patient's history were reviewed and updated as appropriate: allergies, current medications, past family history, past medical history, past social history, past surgical history and problem list.    Current Medications:   Current Outpatient Medications:   •  Ascorbic Acid (VITAMIN C PO), Take  by mouth., Disp: , Rfl:   •  Erenumab-aooe (AIMOVIG) 140 MG/ML prefilled syringe, Inject 1 mL under the skin into the appropriate area as directed Every 30 (Thirty) Days., Disp: 1 mL, Rfl: 0  •  fexofenadine-pseudoephedrine (ALLEGRA-D 24) 180-240 MG per 24 hr tablet, Take 1 tablet by mouth Daily., Disp: , Rfl:   •  fluticasone (FLONASE) 50 MCG/ACT nasal spray, Flonase Allergy Relief 50 mcg/actuation nasal spray,suspension  Spray 2 sprays every day by intranasal route., Disp: , Rfl:   •  rizatriptan (MAXALT) 10 MG tablet, TAKE 1 TABLET BY MOUTH AS NEEDED FOR MIGRAINE MAY REPEAT ONE TIME FOR PERSISTENT MIGRAINE. NO MORE THAN 2 TABS PER 24 HOURS, Disp: 10 tablet, Rfl: 3  **I did not stop or change the above medications.  Patient's medication list was updated to reflect medications they have reported as currently taking, including medication changes made by other providers.    OBJECTIVE  Vitals:    03/09/22 0728   BP: 130/88   Pulse: 80   SpO2: 98%     Body mass index is 28.41 kg/m².    Diagnostics:  MRA head 1/6/2022: FINDINGS: There is no evidence of restricted diffusion to suggest acute  infarction. The brain and ventricles are symmetrical. Axial T2 FLAIR  sequence demonstrates a small area of increased signal intensity  involving the subcortical white matter of the right parietal lobe  superiolaterally measuring approximately 4  mm in size, nonspecific.     There is signal present within the distal aspect of the vertebral  arteries bilaterally which are codominant. The basilar artery and the  proximal aspects of the posterior cerebral arteries appear unremarkable.     The distal aspects of the internal carotid arteries are of normal  caliber. The proximal aspects of the anterior and middle cerebral  arteries appear unremarkable.     IMPRESSION:  No evidence of aneurysm or stenosis. 4 mm area of increased  signal intensity involving the subcortical white matter of the right  parietal lobe superolaterally, nonspecific. This may be idiopathic or  represent early small vessel ischemic changes. The appearance does not  fulfill Alarcon supervised criteria for a demyelinating process.    Laboratory Results:         Lab Results   Component Value Date    WBC 3.84 (L) 04/01/2021    HGB 12.8 04/01/2021    HCT 40.4 04/01/2021    MCV 94.6 04/01/2021     04/01/2021     Lab Results   Component Value Date    GLUCOSE 101 (H) 06/24/2020    BUN 11 04/01/2021    CREATININE 0.6 (L) 04/01/2021    EGFRIFNONA 100 06/24/2020    EGFRIFAFRI 121 06/24/2020    BCR 20.0 04/01/2021    K 4.7 04/01/2021    CO2 24 04/01/2021    CALCIUM 9.3 04/01/2021    PROTENTOTREF 7.0 06/24/2020    ALBUMIN 4.5 04/01/2021    LABIL2 1.5 04/01/2021    AST 18 04/01/2021    ALT 14 04/01/2021     No results found for: HGBA1C  No results found for: CHOL  Lab Results   Component Value Date    HDL 42 (L) 10/05/2020    HDL 43 06/24/2020     Lab Results   Component Value Date     (H) 10/05/2020     (H) 06/24/2020     Lab Results   Component Value Date    TRIG 96 10/05/2020    TRIG 111 06/24/2020     Lab Results   Component Value Date    RPR Non-Reactive 02/10/2016     Lab Results   Component Value Date    TSH 1.210 04/01/2021     Lab Results   Component Value Date    [id number] 385 10/05/2020       Physical Exam:  GENERAL: NAD  HEENT: Normocephalic, atraumatic   COR: RRR  Resp:  Even and unlabored  Extremities: No signs of distal embolization.   Skin: No rashes, lesions or ulcers.  Psychiatric: Normal mood and affect.    Neurological:   MS: AO. Language normal. No neglect. Follows all commands.  CN: II-XII grossly normal  Motor: Normal strength and tone throughout.  Sensory: Intact to light touch in arms and legs  Station and Gait: Normal gait and station.    Coordination: Normal finger to nose bilaterally    Impression/Plan: Ms. Cobb presents for f/u of migraine with aura, continues to do well on above regimen with some increasing headaches due to missing a few doses of her Aimovig.  Now that she is back on track symptoms are improving.  She does continue with some baseline chronic tinnitus, we reviewed her MRA results today.  She will continue current regimen, I have also advised her to spot check her BP is over the last year it has shown some trending upward.  She will be following up with her PCP for BP management.  She will follow-up here in 1 year, sooner if symptoms warrant.     I spent a total of 30 minutes today in reviewing records, recent MRA head, examination of patient as well as counseling and educating patient regarding diagnoses, symptoms, reviewing diagnostics with patient, pharmacologic treatment options including purpose, risk, benefits, possible side-effects, recommendations, lifestyle modifications, coordination of care and documenting plan of care.          Diagnoses and all orders for this visit:    1. Migraine with aura and without status migrainosus, not intractable (Primary)    2. Pulsatile tinnitus        Coding      Dictated using Dragon

## 2022-03-30 RX ORDER — RIZATRIPTAN BENZOATE 10 MG/1
TABLET ORAL
Qty: 10 TABLET | Refills: 0 | Status: SHIPPED | OUTPATIENT
Start: 2022-03-30 | End: 2022-05-18

## 2022-05-18 RX ORDER — RIZATRIPTAN BENZOATE 10 MG/1
TABLET ORAL
Qty: 10 TABLET | Refills: 0 | Status: SHIPPED | OUTPATIENT
Start: 2022-05-18 | End: 2022-07-19

## 2022-05-19 RX ORDER — ONDANSETRON 4 MG/1
4 TABLET, FILM COATED ORAL DAILY PRN
Qty: 30 TABLET | Refills: 1 | Status: SHIPPED | OUTPATIENT
Start: 2022-05-19 | End: 2023-05-19

## 2022-05-19 RX ORDER — ONDANSETRON 4 MG/1
4 TABLET, FILM COATED ORAL DAILY PRN
Qty: 30 TABLET | Refills: 1 | Status: SHIPPED | OUTPATIENT
Start: 2022-05-19 | End: 2022-05-19 | Stop reason: SDUPTHER

## 2022-06-30 ENCOUNTER — APPOINTMENT (OUTPATIENT)
Dept: GENERAL RADIOLOGY | Facility: HOSPITAL | Age: 37
End: 2022-06-30

## 2022-06-30 ENCOUNTER — HOSPITAL ENCOUNTER (EMERGENCY)
Facility: HOSPITAL | Age: 37
Discharge: HOME | End: 2022-06-30
Attending: EMERGENCY MEDICINE | Admitting: EMERGENCY MEDICINE

## 2022-06-30 VITALS
DIASTOLIC BLOOD PRESSURE: 85 MMHG | SYSTOLIC BLOOD PRESSURE: 120 MMHG | HEART RATE: 74 BPM | RESPIRATION RATE: 16 BRPM | TEMPERATURE: 97.6 F | OXYGEN SATURATION: 100 %

## 2022-06-30 DIAGNOSIS — S46.911A STRAIN OF RIGHT SHOULDER, INITIAL ENCOUNTER: Primary | ICD-10-CM

## 2022-06-30 DIAGNOSIS — S46.009A ROTATOR CUFF INJURY, INITIAL ENCOUNTER: ICD-10-CM

## 2022-06-30 PROCEDURE — 73030 X-RAY EXAM OF SHOULDER: CPT

## 2022-06-30 PROCEDURE — 99282 EMERGENCY DEPT VISIT SF MDM: CPT

## 2022-06-30 RX ORDER — HYDROCODONE BITARTRATE AND ACETAMINOPHEN 5; 325 MG/1; MG/1
1 TABLET ORAL EVERY 6 HOURS PRN
Qty: 8 TABLET | Refills: 0 | Status: SHIPPED | OUTPATIENT
Start: 2022-06-30 | End: 2022-06-30 | Stop reason: SDUPTHER

## 2022-06-30 RX ORDER — HYDROCODONE BITARTRATE AND ACETAMINOPHEN 5; 325 MG/1; MG/1
1 TABLET ORAL EVERY 6 HOURS PRN
Qty: 8 TABLET | Refills: 0 | Status: SHIPPED | OUTPATIENT
Start: 2022-06-30 | End: 2022-07-19

## 2022-07-06 ENCOUNTER — TRANSCRIBE ORDERS (OUTPATIENT)
Dept: ADMINISTRATIVE | Facility: HOSPITAL | Age: 37
End: 2022-07-06

## 2022-07-06 DIAGNOSIS — M24.411 CHRONIC DISLOCATION OF RIGHT SHOULDER: Primary | ICD-10-CM

## 2022-07-18 ENCOUNTER — TRANSCRIBE ORDERS (OUTPATIENT)
Dept: LAB | Facility: HOSPITAL | Age: 37
End: 2022-07-18

## 2022-07-18 ENCOUNTER — LAB (OUTPATIENT)
Dept: LAB | Facility: HOSPITAL | Age: 37
End: 2022-07-18

## 2022-07-18 DIAGNOSIS — Z01.818 PRE-OP TESTING: ICD-10-CM

## 2022-07-18 DIAGNOSIS — Z01.818 PRE-OP TESTING: Primary | ICD-10-CM

## 2022-07-18 LAB
BASOPHILS # BLD AUTO: 0.02 10*3/MM3 (ref 0–0.2)
BASOPHILS NFR BLD AUTO: 0.6 % (ref 0–1.5)
BILIRUB UR QL STRIP: NEGATIVE
CLARITY UR: CLEAR
COLOR UR: YELLOW
DEPRECATED RDW RBC AUTO: 40 FL (ref 37–54)
EOSINOPHIL # BLD AUTO: 0.08 10*3/MM3 (ref 0–0.4)
EOSINOPHIL NFR BLD AUTO: 2.2 % (ref 0.3–6.2)
ERYTHROCYTE [DISTWIDTH] IN BLOOD BY AUTOMATED COUNT: 11.9 % (ref 12.3–15.4)
GLUCOSE UR STRIP-MCNC: NEGATIVE MG/DL
HCT VFR BLD AUTO: 37.5 % (ref 34–46.6)
HGB BLD-MCNC: 12.5 G/DL (ref 12–15.9)
HGB UR QL STRIP.AUTO: NEGATIVE
IMM GRANULOCYTES # BLD AUTO: 0 10*3/MM3 (ref 0–0.05)
IMM GRANULOCYTES NFR BLD AUTO: 0 % (ref 0–0.5)
KETONES UR QL STRIP: NEGATIVE
LEUKOCYTE ESTERASE UR QL STRIP.AUTO: NEGATIVE
LYMPHOCYTES # BLD AUTO: 1.69 10*3/MM3 (ref 0.7–3.1)
LYMPHOCYTES NFR BLD AUTO: 46.9 % (ref 19.6–45.3)
MCH RBC QN AUTO: 30.5 PG (ref 26.6–33)
MCHC RBC AUTO-ENTMCNC: 33.3 G/DL (ref 31.5–35.7)
MCV RBC AUTO: 91.5 FL (ref 79–97)
MONOCYTES # BLD AUTO: 0.31 10*3/MM3 (ref 0.1–0.9)
MONOCYTES NFR BLD AUTO: 8.6 % (ref 5–12)
NEUTROPHILS NFR BLD AUTO: 1.5 10*3/MM3 (ref 1.7–7)
NEUTROPHILS NFR BLD AUTO: 41.7 % (ref 42.7–76)
NITRITE UR QL STRIP: NEGATIVE
NRBC BLD AUTO-RTO: 0 /100 WBC (ref 0–0.2)
PH UR STRIP.AUTO: 6.5 [PH] (ref 5–8)
PLATELET # BLD AUTO: 252 10*3/MM3 (ref 140–450)
PMV BLD AUTO: 9.3 FL (ref 6–12)
PROT UR QL STRIP: NEGATIVE
RBC # BLD AUTO: 4.1 10*6/MM3 (ref 3.77–5.28)
SARS-COV-2 ORF1AB RESP QL NAA+PROBE: NOT DETECTED
SP GR UR STRIP: 1.01 (ref 1–1.03)
UROBILINOGEN UR QL STRIP: NORMAL
WBC NRBC COR # BLD: 3.6 10*3/MM3 (ref 3.4–10.8)

## 2022-07-18 PROCEDURE — C9803 HOPD COVID-19 SPEC COLLECT: HCPCS

## 2022-07-18 PROCEDURE — 85025 COMPLETE CBC W/AUTO DIFF WBC: CPT

## 2022-07-18 PROCEDURE — U0004 COV-19 TEST NON-CDC HGH THRU: HCPCS

## 2022-07-18 PROCEDURE — 81003 URINALYSIS AUTO W/O SCOPE: CPT

## 2022-07-18 PROCEDURE — 36415 COLL VENOUS BLD VENIPUNCTURE: CPT

## 2022-07-19 RX ORDER — IBUPROFEN 200 MG
200 TABLET ORAL EVERY 6 HOURS PRN
COMMUNITY

## 2022-07-19 RX ORDER — RIZATRIPTAN BENZOATE 10 MG/1
TABLET ORAL
Qty: 10 TABLET | Refills: 0 | Status: SHIPPED | OUTPATIENT
Start: 2022-07-19 | End: 2022-09-19

## 2022-07-20 ENCOUNTER — APPOINTMENT (OUTPATIENT)
Dept: GENERAL RADIOLOGY | Facility: HOSPITAL | Age: 37
End: 2022-07-20

## 2022-07-20 ENCOUNTER — ANESTHESIA (OUTPATIENT)
Dept: PERIOP | Facility: HOSPITAL | Age: 37
End: 2022-07-20

## 2022-07-20 ENCOUNTER — ANESTHESIA EVENT (OUTPATIENT)
Dept: PERIOP | Facility: HOSPITAL | Age: 37
End: 2022-07-20

## 2022-07-20 ENCOUNTER — HOSPITAL ENCOUNTER (OUTPATIENT)
Facility: HOSPITAL | Age: 37
Setting detail: HOSPITAL OUTPATIENT SURGERY
Discharge: HOME | End: 2022-07-20
Attending: ORTHOPAEDIC SURGERY | Admitting: ORTHOPAEDIC SURGERY

## 2022-07-20 VITALS
HEIGHT: 65 IN | TEMPERATURE: 97.5 F | BODY MASS INDEX: 28.32 KG/M2 | SYSTOLIC BLOOD PRESSURE: 109 MMHG | HEART RATE: 62 BPM | WEIGHT: 170 LBS | RESPIRATION RATE: 18 BRPM | DIASTOLIC BLOOD PRESSURE: 82 MMHG | OXYGEN SATURATION: 100 %

## 2022-07-20 LAB
B-HCG UR QL: NEGATIVE
EXPIRATION DATE: NORMAL
INTERNAL NEGATIVE CONTROL: NEGATIVE
INTERNAL POSITIVE CONTROL: POSITIVE
Lab: NORMAL

## 2022-07-20 PROCEDURE — 25010000002 FENTANYL CITRATE (PF) 50 MCG/ML SOLUTION: Performed by: ANESTHESIOLOGY

## 2022-07-20 PROCEDURE — 25010000002 ROPIVACAINE PER 1 MG: Performed by: ANESTHESIOLOGY

## 2022-07-20 PROCEDURE — 25010000002 PROPOFOL 10 MG/ML EMULSION: Performed by: NURSE ANESTHETIST, CERTIFIED REGISTERED

## 2022-07-20 PROCEDURE — C1713 ANCHOR/SCREW BN/BN,TIS/BN: HCPCS | Performed by: ORTHOPAEDIC SURGERY

## 2022-07-20 PROCEDURE — 76942 ECHO GUIDE FOR BIOPSY: CPT | Performed by: ORTHOPAEDIC SURGERY

## 2022-07-20 PROCEDURE — 25010000002 EPINEPHRINE PER 0.1 MG: Performed by: ORTHOPAEDIC SURGERY

## 2022-07-20 PROCEDURE — 25010000002 NEOSTIGMINE 5 MG/10ML SOLUTION: Performed by: NURSE ANESTHETIST, CERTIFIED REGISTERED

## 2022-07-20 PROCEDURE — 25010000002 ONDANSETRON PER 1 MG: Performed by: NURSE ANESTHETIST, CERTIFIED REGISTERED

## 2022-07-20 PROCEDURE — 25010000002 FENTANYL CITRATE (PF) 50 MCG/ML SOLUTION: Performed by: NURSE ANESTHETIST, CERTIFIED REGISTERED

## 2022-07-20 PROCEDURE — 25010000002 CEFAZOLIN IN DEXTROSE 2-4 GM/100ML-% SOLUTION: Performed by: ORTHOPAEDIC SURGERY

## 2022-07-20 PROCEDURE — 25010000002 DEXAMETHASONE PER 1 MG: Performed by: NURSE ANESTHETIST, CERTIFIED REGISTERED

## 2022-07-20 PROCEDURE — L3670 SO ACRO/CLAV CAN WEB PRE OTS: HCPCS | Performed by: ORTHOPAEDIC SURGERY

## 2022-07-20 PROCEDURE — 25010000002 PHENYLEPHRINE 10 MG/ML SOLUTION: Performed by: NURSE ANESTHETIST, CERTIFIED REGISTERED

## 2022-07-20 PROCEDURE — 81025 URINE PREGNANCY TEST: CPT | Performed by: ORTHOPAEDIC SURGERY

## 2022-07-20 PROCEDURE — 25010000002 MIDAZOLAM PER 1 MG: Performed by: ANESTHESIOLOGY

## 2022-07-20 DEVICE — IMPLANTABLE DEVICE
Type: IMPLANTABLE DEVICE | Site: SHOULDER | Status: FUNCTIONAL
Brand: JUGGERKNOT SOFT ANCHORS

## 2022-07-20 RX ORDER — NALOXONE HCL 0.4 MG/ML
0.2 VIAL (ML) INJECTION AS NEEDED
Status: DISCONTINUED | OUTPATIENT
Start: 2022-07-20 | End: 2022-07-20 | Stop reason: HOSPADM

## 2022-07-20 RX ORDER — GLYCOPYRROLATE 0.2 MG/ML
INJECTION INTRAMUSCULAR; INTRAVENOUS AS NEEDED
Status: DISCONTINUED | OUTPATIENT
Start: 2022-07-20 | End: 2022-07-20 | Stop reason: SURG

## 2022-07-20 RX ORDER — FLUMAZENIL 0.1 MG/ML
0.2 INJECTION INTRAVENOUS AS NEEDED
Status: DISCONTINUED | OUTPATIENT
Start: 2022-07-20 | End: 2022-07-20 | Stop reason: HOSPADM

## 2022-07-20 RX ORDER — SODIUM CHLORIDE 0.9 % (FLUSH) 0.9 %
3-10 SYRINGE (ML) INJECTION AS NEEDED
Status: DISCONTINUED | OUTPATIENT
Start: 2022-07-20 | End: 2022-07-20 | Stop reason: HOSPADM

## 2022-07-20 RX ORDER — EPHEDRINE SULFATE 50 MG/ML
5 INJECTION, SOLUTION INTRAVENOUS ONCE AS NEEDED
Status: DISCONTINUED | OUTPATIENT
Start: 2022-07-20 | End: 2022-07-20 | Stop reason: HOSPADM

## 2022-07-20 RX ORDER — DEXAMETHASONE SODIUM PHOSPHATE 10 MG/ML
INJECTION INTRAMUSCULAR; INTRAVENOUS AS NEEDED
Status: DISCONTINUED | OUTPATIENT
Start: 2022-07-20 | End: 2022-07-20 | Stop reason: SURG

## 2022-07-20 RX ORDER — FENTANYL CITRATE 50 UG/ML
INJECTION, SOLUTION INTRAMUSCULAR; INTRAVENOUS AS NEEDED
Status: DISCONTINUED | OUTPATIENT
Start: 2022-07-20 | End: 2022-07-20 | Stop reason: SURG

## 2022-07-20 RX ORDER — ROCURONIUM BROMIDE 10 MG/ML
INJECTION, SOLUTION INTRAVENOUS AS NEEDED
Status: DISCONTINUED | OUTPATIENT
Start: 2022-07-20 | End: 2022-07-20 | Stop reason: SURG

## 2022-07-20 RX ORDER — HYDRALAZINE HYDROCHLORIDE 20 MG/ML
5 INJECTION INTRAMUSCULAR; INTRAVENOUS
Status: DISCONTINUED | OUTPATIENT
Start: 2022-07-20 | End: 2022-07-20 | Stop reason: HOSPADM

## 2022-07-20 RX ORDER — NEOSTIGMINE METHYLSULFATE 0.5 MG/ML
INJECTION, SOLUTION INTRAVENOUS AS NEEDED
Status: DISCONTINUED | OUTPATIENT
Start: 2022-07-20 | End: 2022-07-20 | Stop reason: SURG

## 2022-07-20 RX ORDER — ONDANSETRON 2 MG/ML
4 INJECTION INTRAMUSCULAR; INTRAVENOUS ONCE AS NEEDED
Status: COMPLETED | OUTPATIENT
Start: 2022-07-20 | End: 2022-07-20

## 2022-07-20 RX ORDER — ONDANSETRON 2 MG/ML
INJECTION INTRAMUSCULAR; INTRAVENOUS AS NEEDED
Status: DISCONTINUED | OUTPATIENT
Start: 2022-07-20 | End: 2022-07-20 | Stop reason: SURG

## 2022-07-20 RX ORDER — ROPIVACAINE HYDROCHLORIDE 5 MG/ML
INJECTION, SOLUTION EPIDURAL; INFILTRATION; PERINEURAL
Status: COMPLETED | OUTPATIENT
Start: 2022-07-20 | End: 2022-07-20

## 2022-07-20 RX ORDER — PROPOFOL 10 MG/ML
VIAL (ML) INTRAVENOUS AS NEEDED
Status: DISCONTINUED | OUTPATIENT
Start: 2022-07-20 | End: 2022-07-20 | Stop reason: SURG

## 2022-07-20 RX ORDER — FAMOTIDINE 10 MG/ML
20 INJECTION, SOLUTION INTRAVENOUS ONCE
Status: COMPLETED | OUTPATIENT
Start: 2022-07-20 | End: 2022-07-20

## 2022-07-20 RX ORDER — PROMETHAZINE HYDROCHLORIDE 12.5 MG/1
12.5 TABLET ORAL EVERY 6 HOURS PRN
Qty: 20 TABLET | Refills: 0 | Status: SHIPPED | OUTPATIENT
Start: 2022-07-20

## 2022-07-20 RX ORDER — DIPHENHYDRAMINE HCL 25 MG
25 CAPSULE ORAL
Status: DISCONTINUED | OUTPATIENT
Start: 2022-07-20 | End: 2022-07-20 | Stop reason: HOSPADM

## 2022-07-20 RX ORDER — LIDOCAINE HYDROCHLORIDE 20 MG/ML
INJECTION, SOLUTION INFILTRATION; PERINEURAL AS NEEDED
Status: DISCONTINUED | OUTPATIENT
Start: 2022-07-20 | End: 2022-07-20 | Stop reason: SURG

## 2022-07-20 RX ORDER — PROMETHAZINE HYDROCHLORIDE 25 MG/1
25 TABLET ORAL ONCE AS NEEDED
Status: DISCONTINUED | OUTPATIENT
Start: 2022-07-20 | End: 2022-07-20 | Stop reason: HOSPADM

## 2022-07-20 RX ORDER — CEFAZOLIN SODIUM 2 G/100ML
2 INJECTION, SOLUTION INTRAVENOUS ONCE
Status: COMPLETED | OUTPATIENT
Start: 2022-07-20 | End: 2022-07-20

## 2022-07-20 RX ORDER — PROMETHAZINE HYDROCHLORIDE 25 MG/1
25 SUPPOSITORY RECTAL ONCE AS NEEDED
Status: DISCONTINUED | OUTPATIENT
Start: 2022-07-20 | End: 2022-07-20 | Stop reason: HOSPADM

## 2022-07-20 RX ORDER — HYDROMORPHONE HYDROCHLORIDE 1 MG/ML
0.5 INJECTION, SOLUTION INTRAMUSCULAR; INTRAVENOUS; SUBCUTANEOUS
Status: DISCONTINUED | OUTPATIENT
Start: 2022-07-20 | End: 2022-07-20 | Stop reason: HOSPADM

## 2022-07-20 RX ORDER — LIDOCAINE HYDROCHLORIDE 10 MG/ML
0.5 INJECTION, SOLUTION EPIDURAL; INFILTRATION; INTRACAUDAL; PERINEURAL ONCE AS NEEDED
Status: DISCONTINUED | OUTPATIENT
Start: 2022-07-20 | End: 2022-07-20 | Stop reason: HOSPADM

## 2022-07-20 RX ORDER — PHENYLEPHRINE HYDROCHLORIDE 10 MG/ML
INJECTION INTRAVENOUS AS NEEDED
Status: DISCONTINUED | OUTPATIENT
Start: 2022-07-20 | End: 2022-07-20 | Stop reason: SURG

## 2022-07-20 RX ORDER — FENTANYL CITRATE 50 UG/ML
50 INJECTION, SOLUTION INTRAMUSCULAR; INTRAVENOUS
Status: DISCONTINUED | OUTPATIENT
Start: 2022-07-20 | End: 2022-07-20 | Stop reason: HOSPADM

## 2022-07-20 RX ORDER — IBUPROFEN 600 MG/1
600 TABLET ORAL ONCE AS NEEDED
Status: DISCONTINUED | OUTPATIENT
Start: 2022-07-20 | End: 2022-07-20 | Stop reason: HOSPADM

## 2022-07-20 RX ORDER — DIPHENHYDRAMINE HYDROCHLORIDE 50 MG/ML
12.5 INJECTION INTRAMUSCULAR; INTRAVENOUS
Status: DISCONTINUED | OUTPATIENT
Start: 2022-07-20 | End: 2022-07-20 | Stop reason: HOSPADM

## 2022-07-20 RX ORDER — HYDROCODONE BITARTRATE AND ACETAMINOPHEN 7.5; 325 MG/1; MG/1
1 TABLET ORAL ONCE AS NEEDED
Status: DISCONTINUED | OUTPATIENT
Start: 2022-07-20 | End: 2022-07-20 | Stop reason: HOSPADM

## 2022-07-20 RX ORDER — HYDROCODONE BITARTRATE AND ACETAMINOPHEN 5; 325 MG/1; MG/1
1-2 TABLET ORAL EVERY 6 HOURS PRN
Qty: 40 TABLET | Refills: 0 | Status: SHIPPED | OUTPATIENT
Start: 2022-07-20 | End: 2023-03-08

## 2022-07-20 RX ORDER — SODIUM CHLORIDE, SODIUM LACTATE, POTASSIUM CHLORIDE, CALCIUM CHLORIDE 600; 310; 30; 20 MG/100ML; MG/100ML; MG/100ML; MG/100ML
9 INJECTION, SOLUTION INTRAVENOUS CONTINUOUS
Status: DISCONTINUED | OUTPATIENT
Start: 2022-07-20 | End: 2022-07-20 | Stop reason: HOSPADM

## 2022-07-20 RX ORDER — SODIUM CHLORIDE 0.9 % (FLUSH) 0.9 %
3 SYRINGE (ML) INJECTION EVERY 12 HOURS SCHEDULED
Status: DISCONTINUED | OUTPATIENT
Start: 2022-07-20 | End: 2022-07-20 | Stop reason: HOSPADM

## 2022-07-20 RX ORDER — LABETALOL HYDROCHLORIDE 5 MG/ML
5 INJECTION, SOLUTION INTRAVENOUS
Status: DISCONTINUED | OUTPATIENT
Start: 2022-07-20 | End: 2022-07-20 | Stop reason: HOSPADM

## 2022-07-20 RX ORDER — EPHEDRINE SULFATE 50 MG/ML
INJECTION, SOLUTION INTRAVENOUS AS NEEDED
Status: DISCONTINUED | OUTPATIENT
Start: 2022-07-20 | End: 2022-07-20 | Stop reason: SURG

## 2022-07-20 RX ORDER — MIDAZOLAM HYDROCHLORIDE 1 MG/ML
1 INJECTION INTRAMUSCULAR; INTRAVENOUS
Status: COMPLETED | OUTPATIENT
Start: 2022-07-20 | End: 2022-07-20

## 2022-07-20 RX ADMIN — LIDOCAINE HYDROCHLORIDE 80 MG: 20 INJECTION, SOLUTION INFILTRATION; PERINEURAL at 08:25

## 2022-07-20 RX ADMIN — PHENYLEPHRINE HYDROCHLORIDE 100 MCG: 10 INJECTION, SOLUTION INTRAVENOUS at 08:51

## 2022-07-20 RX ADMIN — MIDAZOLAM 1 MG: 1 INJECTION INTRAMUSCULAR; INTRAVENOUS at 07:40

## 2022-07-20 RX ADMIN — NEOSTIGMINE METHYLSULFATE 3 MG: 0.5 INJECTION INTRAVENOUS at 09:24

## 2022-07-20 RX ADMIN — PROPOFOL 200 MG: 10 INJECTION, EMULSION INTRAVENOUS at 08:25

## 2022-07-20 RX ADMIN — GLYCOPYRROLATE 0.4 MG: 0.2 INJECTION INTRAMUSCULAR; INTRAVENOUS at 09:24

## 2022-07-20 RX ADMIN — FENTANYL CITRATE 50 MCG: 50 INJECTION INTRAMUSCULAR; INTRAVENOUS at 08:23

## 2022-07-20 RX ADMIN — FENTANYL CITRATE 50 MCG: 50 INJECTION INTRAMUSCULAR; INTRAVENOUS at 07:23

## 2022-07-20 RX ADMIN — CEFAZOLIN SODIUM 2 G: 2 INJECTION, SOLUTION INTRAVENOUS at 08:15

## 2022-07-20 RX ADMIN — EPHEDRINE SULFATE 10 MG: 50 INJECTION INTRAVENOUS at 09:17

## 2022-07-20 RX ADMIN — MIDAZOLAM 1 MG: 1 INJECTION INTRAMUSCULAR; INTRAVENOUS at 07:23

## 2022-07-20 RX ADMIN — ROCURONIUM BROMIDE 40 MG: 50 INJECTION INTRAVENOUS at 08:25

## 2022-07-20 RX ADMIN — EPHEDRINE SULFATE 10 MG: 50 INJECTION INTRAVENOUS at 09:27

## 2022-07-20 RX ADMIN — ONDANSETRON 4 MG: 2 INJECTION INTRAMUSCULAR; INTRAVENOUS at 08:48

## 2022-07-20 RX ADMIN — PHENYLEPHRINE HYDROCHLORIDE 100 MCG: 10 INJECTION, SOLUTION INTRAVENOUS at 09:09

## 2022-07-20 RX ADMIN — ONDANSETRON 4 MG: 2 INJECTION INTRAMUSCULAR; INTRAVENOUS at 10:10

## 2022-07-20 RX ADMIN — SODIUM CHLORIDE, POTASSIUM CHLORIDE, SODIUM LACTATE AND CALCIUM CHLORIDE: 600; 310; 30; 20 INJECTION, SOLUTION INTRAVENOUS at 08:47

## 2022-07-20 RX ADMIN — DEXAMETHASONE SODIUM PHOSPHATE 8 MG: 10 INJECTION INTRAMUSCULAR; INTRAVENOUS at 08:29

## 2022-07-20 RX ADMIN — ROPIVACAINE HYDROCHLORIDE 30 ML: 5 INJECTION EPIDURAL; INFILTRATION; PERINEURAL at 07:31

## 2022-07-20 RX ADMIN — FAMOTIDINE 20 MG: 10 INJECTION INTRAVENOUS at 07:51

## 2022-07-20 NOTE — ANESTHESIA PROCEDURE NOTES
Peripheral Block      Patient reassessed immediately prior to procedure    Patient location during procedure: holding area  Start time: 7/20/2022 7:31 AM  Stop time: 7/20/2022 7:35 AM  Reason for block: at surgeon's request and post-op pain management  Performed by  Anesthesiologist: Magen Santiago MD  Preanesthetic Checklist  Completed: patient identified, IV checked, site marked, risks and benefits discussed, surgical consent, monitors and equipment checked, pre-op evaluation and timeout performed  Prep:  Sterile barriers:cap, gloves, mask and sterile barriers  Prep: ChloraPrep  Patient monitoring: blood pressure monitoring, continuous pulse oximetry and EKG  Procedure    Sedation: yes  Performed under: local infiltration  Guidance:ultrasound guided    ULTRASOUND INTERPRETATION. Using ultrasound guidance a 21 G gauge needle was placed in close proximity to the nerve, at which point, under ultrasound guidance anesthetic was injected in the area of the nerve and spread of the anesthesia was seen on ultrasound in close proximity thereto.  There were no abnormalities seen on ultrasound; a digital image was taken; and the patient tolerated the procedure with no complications. Images:still images obtained    Laterality:right  Block Type:interscalene  Injection Technique:single-shot  Needle Type:echogenic  Needle Gauge:21 G  Resistance on Injection: none    Medications Used: ropivacaine (NAROPIN) 0.5 % injection, 30 mL  Med administered at 7/20/2022 7:31 AM      Post Assessment  Injection Assessment: negative aspiration for heme, no paresthesia on injection and incremental injection  Patient Tolerance:comfortable throughout block  Complications:no  Additional Notes  Ultrasound interpretation note:  Under ultrasound guidance, needle seen near nerves, local seen spreading around.  No abnormalities noted.  Block for postop pain per surgeon request.

## 2022-07-20 NOTE — H&P
Patient Care Team:  Mary Kuhn APRN as PCP - General (Family Medicine)  Juan Ramon Dias MD as PCP - Ob/Gyn (Obstetrics and Gynecology)  Brooklynn Macario PA as Physician Assistant (Obstetrics and Gynecology)    CHIEF COMPLAINT right shoulder pain      Subjective   Patient is a 37 y.o. female presents with I am seeing for acute injury to her right shoulder.  She had a dislocation which occurred on June 30.  This was self reduced.  The same thing happened about 3 years ago.  She went to the emergency room and x-ray showed an anterior dislocation with no signs of fracture.    Review of Systems   All systems were reviewed and negative except for:  Musculoskeletal: positive for  joint pain, joint stiffness and joint swelling    History  Past Medical History:   Diagnosis Date   • Contact dermatitis     FOLLOWED BY DERMATOLOGY - CHRONIC   • History of COVID-19 04/2021   • Hx of blood clots 2018    S/P OVARIAN CYSTECTOMY  - RIGHT LEG SUPERFICIAL   • Migraine    • Tear of right glenoid labrum        Current Facility-Administered Medications:   •  ceFAZolin in dextrose (ANCEF) IVPB solution 2 g, 2 g, Intravenous, Once, Kristian Lang MD    Objective   Vital Signs  Temp:  [98 °F (36.7 °C)] 98 °F (36.7 °C)  Heart Rate:  [78] 78  Resp:  [17] 17  BP: (133)/(99) 133/99    Physical Exam:    Lungs: clear to auscultation, respirations regular, respirations even and respirations unlabored  Heart: regular rhythm & normal rate, normal S1, S2, no murmur, no gallop, no rub and no click    Results Review:    I reviewed the patient's new clinical results.    Assessment & Plan   1.  Dislocation of the shoulder joint  Plan  This is her second dislocation and she is symptomatic recommendations proceed with a right shoulder arthroscopy for capsular labral repair.    * No active hospital problems. *          I discussed the patients findings and my recommendations with patient.     Kristian Lang MD  07/20/22  07:05 EDT

## 2022-07-20 NOTE — ANESTHESIA PREPROCEDURE EVALUATION
Anesthesia Evaluation     Patient summary reviewed and Nursing notes reviewed   NPO Solid Status: > 8 hours  NPO Liquid Status: > 2 hours           Airway   Mallampati: II  TM distance: >3 FB  Neck ROM: full  no difficulty expected  Dental - normal exam     Pulmonary - negative pulmonary ROS and normal exam   (-) decreased breath sounds, wheezes  Cardiovascular - normal exam  Exercise tolerance: good (4-7 METS)    (-) hypertension      Neuro/Psych- negative ROS  (-) seizures, CVA  GI/Hepatic/Renal/Endo - negative ROS   (-) diabetes    Musculoskeletal (-) negative ROS    Abdominal  - normal exam   Substance History - negative use  (-) alcohol use, drug use     OB/GYN negative ob/gyn ROS         Other - negative ROS                       Anesthesia Plan    ASA 1     general     intravenous induction     Anesthetic plan, risks, benefits, and alternatives have been provided, discussed and informed consent has been obtained with: patient.    Plan discussed with CRNA.        CODE STATUS:

## 2022-07-20 NOTE — OP NOTE
SHOULDER ARTHROSCOPY  Procedure Note    Stephanie Cobb  7/20/2022    Pre-op Diagnosis:   1.  Right shoulder instability  2.   3.     Post-op Diagnosis:     1.  Same  2.   3.     Procedure(s):  1.  Right shoulder arthroscopic capsular and labral repair with anchor x4  2.   3.   4.     Surgeon(s):  Kristian Lang MD    Anesthesia: General  Anesthesiologist: Deuce Reina MD  CRNA: Pastora Hameed CRNA    Staff:   Circulator: Gracy Armstrong RN; Emma Webb RN  Scrub Person: Telma Contreras  Assistant: Humberto Jacobo PA-C      Drains: None    Estimated Blood Loss: minimal    Specimen: * No orders in the log *    Description of Procedure: Following induction of anesthesia and examination of the right shoulder under anesthesia was performed.  This revealed significant anterior and anterior inferior translation and near subluxation with light pressure.  The right shoulder was in prepped and draped in a sterile fashion.  I created the posterior portal and inserted the cannula into the joint.  She was found to have a very patulous capsule with tearing of the labrum from 2:00 down the 6 o'clock position.  She also had a positive drive-through sign indicating significant instability.  The undersurface of the rotator cuff appeared normal.  I created the anterior inferior portal just above the subscapularis tendon and inserted the 8 mm cannula.  I inserted the 6 mm cannula just above this as an accessory portal.  I then used a small periosteal elevator to elevate the capsule labral sleeve from the 2:00 down to the 6 o'clock position.  There is a very thin ribbonlike remnant of the labrum that was debrided using the shaver.  I then used the shaver to abrade the edge of the glenoid from the 2:00 down to 6 o'clock position in preparation for our repair.  I then sequentially placed 4 Biomet 1.4 mm juggernaut suture anchors beginning at the 5 o'clock position.  I used the [employer] lasso to pass 1 limb of the suture  through the capsule labral tissue which I then tied with an SMC sliding knot oversewn with a half hitch.  We repeated this technique with anchors at the 4 3 and 2 o'clock position we succeeded in restoring the soft tissue bumper and retensioning the anterior inferior axillary pouch.  We then removed the cannulas and closed the portals with a 3-0 nylon.  She was then placed into a soft sterile dressing in her postop sling.  She will be discharged to recovery and then to home and her prognosis is good.    Humberto Jacobo PA-C assisted me in this procedure.  His presence was necessary to help with holding the patient's limb and the camera.  He allowed me to perform the procedure in a much quicker fashion resulting in less morbidity and risk for the patient.  An extra set of skilled sugical hands was necessary to effectively perform this procedure.    Findings: See Dictation    Complications: None      Kristian Lang MD     Date: 7/20/2022  Time: 09:26 EDT

## 2022-07-20 NOTE — DISCHARGE INSTRUCTIONS
What to expect after a Nerve Block    Nerve blocks administered to block pain affect many types of nerves, including those nerves that control movement, pain, and normal sensation. Following a nerve block, you may notice some bruising at the site where the block was given. You may experience sensations such as: numbness of the affected area or limb, tingling, heaviness (that is the limb feels heavy to you), weakness or inability to move the affected arm or leg, or a feeling as if your arm or leg has “fallen asleep.”     A nerve block can last from 2 to 36 hours depending on the medications used.  Usually the weakness wears off first followed by the tingling and heaviness. As the block wears off, you may begin to notice pain; however, this sequence of events may occur in any order. Typically, you will be able to move your limb before you will feel it. Once a nerve block begins to wear off, the effects are usually completely gone within 60 minutes.  If you experience continued side effects that you believe are block related for longer than 48 hours, please call your healthcare provider. Please see block-specific instructions below.      If you have had any kind of shoulder/arm block, you will go home with your arm in a sling. Wear the sling until the block has completely worn off. You may be required to wear it for a longer period of time per your surgeon’s recommendations    If you have had a shoulder/arm block, it is a good idea to sleep on a recliner with pillows under your arm.      You may experience symptoms such as:  Shortness of breath  Hoarseness   Blurry vision  Unequal pupils  Drooping of your face on the same side as the block was performed          *These are side effects associated with this kind of block and should go away within 12 hours.         Note: If you have severe or prolonged shortness of breath, seek medical assistance as soon as possible.         Protection of a “blocked” arm or leg  (limb)  After a nerve block, you cannot feel pain, pressure, or extremes of temperature in the affected limb. And because of this, your blocked limb is at more risk for injury. For example, it is possible to burn your limb on an extremely hot surface without feeling it.     When resting, it is important to reposition your limb periodically to avoid prolonged pressure on it. This may require the use of pillows and padding.    While sleeping, you should avoid rolling onto the affected limb or putting too much pressure on it.     If you have a cast or tight dressing, check the color of your fingers or toes of the affected limb. Call your surgeon if they look discolored (that is, dusky, dark colored).    Use caution in cold weather. Cover your limb appropriately to protect it from the cold.      Pain Management:  Your surgeon will give you a prescription for pain medication. Begin taking this before the nerve block wears off. Bear in mind that sometimes the block can wear off in the middle of the night.      _________________________________________________________________________________________________    Pendulum Exercises for Post Op Shoulder Surgery      Lean over with your good arm supported on a table or chair.  Relax the injured arm and allow it to hang straight down at your side.  Slowly begin to swing the relaxed arm back and forth.  Then swing it side to side.  Next, move it in a [place]. Now,  reverse the direction.      Generally, you should spend about 5 minutes doing this exercise.  It should be done 2-3 times daily or as directed by your physician.

## 2022-07-20 NOTE — ANESTHESIA POSTPROCEDURE EVALUATION
"Patient: Stephanie Cobb    Procedure Summary     Date: 07/20/22 Room / Location: [facility] OR [address] [place] [address] [facility] [place] [facility]    Anesthesia Start: 0818 Anesthesia Stop: 0947    Procedure: RIGHT SHOULDER ARTHROSCOPY WITH CAPSULAR AND LABRAL REPAIR (Right Shoulder) Diagnosis:     Surgeons: Kristian Lang MD Provider: Deuce Reina MD    Anesthesia Type: general ASA Status: 1          Anesthesia Type: general    Vitals  Vitals Value Taken Time   /83 07/20/22 1015   Temp 36.4 °C (97.5 °F) 07/20/22 0943   Pulse 76 07/20/22 1023   Resp 16 07/20/22 1015   SpO2 100 % 07/20/22 1023   Vitals shown include unvalidated device data.        Post Anesthesia Care and Evaluation    Patient location during evaluation: PHASE II  Patient participation: complete - patient participated  Level of consciousness: awake  Pain management: adequate    Airway patency: patent  Anesthetic complications: No anesthetic complications    Cardiovascular status: acceptable  Respiratory status: acceptable  Hydration status: acceptable    Comments: /82 (BP Location: Left arm, Patient Position: Lying)   Pulse 62   Temp 36.4 °C (97.5 °F) (Oral)   Resp 18   Ht 165.1 cm (65\")   Wt 77.1 kg (170 lb)   LMP 06/20/2022 (Exact Date)   SpO2 100%   BMI 28.29 kg/m²         "

## 2022-07-20 NOTE — ANESTHESIA PROCEDURE NOTES
Airway  Urgency: elective    Date/Time: 7/20/2022 8:28 AM  Airway not difficult    General Information and Staff    Patient location during procedure: OR  Anesthesiologist: Deuce Reina MD  CRNA/CAA: Pastora Hameed CRNA    Indications and Patient Condition  Indications for airway management: airway protection    Preoxygenated: yes  MILS not maintained throughout  Mask difficulty assessment: 2 - vent by mask + OA or adjuvant +/- NMBA    Final Airway Details  Final airway type: endotracheal airway      Successful airway: ETT  Cuffed: yes   Successful intubation technique: direct laryngoscopy  Facilitating devices/methods: intubating stylet and anterior pressure/BURP  Endotracheal tube insertion site: oral  Blade: Tori  ETT size (mm): 7.0  Cormack-Lehane Classification: grade I - full view of glottis  Placement verified by: chest auscultation and capnometry   Measured from: lips  ETT/EBT  to lips (cm): 21  Number of attempts at approach: 1  Assessment: lips, teeth, and gum same as pre-op and atraumatic intubation

## 2022-07-28 ENCOUNTER — OFFICE VISIT (OUTPATIENT)
Dept: OBSTETRICS AND GYNECOLOGY | Age: 37
End: 2022-07-28

## 2022-07-28 VITALS
DIASTOLIC BLOOD PRESSURE: 70 MMHG | HEIGHT: 65 IN | WEIGHT: 169 LBS | BODY MASS INDEX: 28.16 KG/M2 | SYSTOLIC BLOOD PRESSURE: 120 MMHG

## 2022-07-28 DIAGNOSIS — Z01.419 ENCOUNTER FOR GYNECOLOGICAL EXAMINATION: Primary | ICD-10-CM

## 2022-07-28 DIAGNOSIS — Z12.4 SCREENING FOR CERVICAL CANCER: ICD-10-CM

## 2022-07-28 PROCEDURE — 99395 PREV VISIT EST AGE 18-39: CPT | Performed by: OBSTETRICS & GYNECOLOGY

## 2022-07-28 NOTE — PROGRESS NOTES
Subjective     Chief Complaint   Patient presents with   • Gynecologic Exam     Annual:last pap          History of Present Illness      Stephanie Cobb is a very pleasant  37 y.o. female who presents for annual exam.  Mammo Exam next year, Contraception condoms, Exercise 4 times a week  Patient without gynecological concerns or complaints she has her own PCP where she is getting wellness labs..      Obstetric History:  OB History        4    Para   4    Term   4            AB        Living   4       SAB        IAB        Ectopic        Molar        Multiple   0    Live Births   4               Menstrual History:     Patient's last menstrual period was 2022 (exact date).       Sexual History:       Past Medical History:   Diagnosis Date   • Contact dermatitis     FOLLOWED BY DERMATOLOGY - CHRONIC   • History of COVID-19 2021   • Hx of blood clots 2018    S/P OVARIAN CYSTECTOMY  - RIGHT LEG SUPERFICIAL   • Migraine    • Tear of right glenoid labrum      Past Surgical History:   Procedure Laterality Date   • DIAGNOSTIC LAPAROSCOPY Left 2018    Procedure: DIAGNOSTIC LAPAROSCOPY/LT.OVARIAN CYSTECTOMY;  Surgeon: Juan Ramon Dias MD;  Location: [facility] MAIN OR;  Service: Obstetrics/Gynecology   • SHOULDER ARTHROSCOPY Right 2022    Procedure: RIGHT SHOULDER ARTHROSCOPY WITH CAPSULAR AND LABRAL REPAIR;  Surgeon: Kristian Lang MD;  Location: [facility] OR [facility];  Service: Orthopedics;  Laterality: Right;   • TUBAL COAGULATION LAPAROSCOPIC Bilateral 2019    Procedure: POSTPARTUM TUBAL LIGATION;  Surgeon: Juan Ramon Dias MD;  Location: [facility] LABOR DELIVERY;  Service: Obstetrics/Gynecology   • WISDOM TOOTH EXTRACTION         SOCIAL Hx:      The following portions of the patient's history were reviewed and updated as appropriate: allergies, current medications, past family history, past medical history, past social history, past surgical history and problem list.    Review of Systems         "Except as outlined in history of physical illness, patient denies any changes in her GYN, , GI systems.  All other systems reviewed were negative.         Current Outpatient Medications:   •  Ascorbic Acid (VITAMIN C PO), Take 1 tablet by mouth Daily., Disp: , Rfl:   •  Erenumab-aooe (AIMOVIG) 140 MG/ML prefilled syringe, Inject 1 mL under the skin into the appropriate area as directed Every 30 (Thirty) Days., Disp: 1 mL, Rfl: 0  •  HYDROcodone-acetaminophen (NORCO) 5-325 MG per tablet, Take 1-2 tablets by mouth Every 6 (Six) Hours As Needed (Pain)., Disp: 40 tablet, Rfl: 0  •  ibuprofen (ADVIL,MOTRIN) 200 MG tablet, Take 200 mg by mouth Every 6 (Six) Hours As Needed for Mild Pain . HELD FOR OR, Disp: , Rfl:   •  ondansetron (Zofran) 4 MG tablet, Take 1 tablet by mouth Daily As Needed for Nausea or Vomiting., Disp: 30 tablet, Rfl: 1  •  promethazine (PHENERGAN) 12.5 MG tablet, Take 1 tablet by mouth Every 6 (Six) Hours As Needed for Nausea or Vomiting., Disp: 20 tablet, Rfl: 0  •  rizatriptan (MAXALT) 10 MG tablet, TAKE 1 TABLET BY MOUTH AT THE ONSET OF HEADACHE, MAY REPEAT 1 DOSE IN 2 HOURS IF HEADACHE IS NOT RESOLVED. MAX DAILY AMOUNT IS 2 TALBETS PER 24 HOURS. (Patient taking differently: Take 10 mg by mouth As Needed.), Disp: 10 tablet, Rfl: 0   Objective   Physical Exam    /70   Ht 165.1 cm (65\")   Wt 76.7 kg (169 lb)   LMP 07/20/2022 (Exact Date)   Breastfeeding No   BMI 28.12 kg/m²     General: Patient is alert and oriented and appears overall healthy  Neck: Is supple without thyromegaly, no carotid bruits and no lymphadenopathy  Lungs: Clear bilaterally, no wheezing, rhonchi, or rales.  Respiratory rate is normal  Breast: Even, symmetrical, no lymphadenopathy, no retraction, no masses or cysts  Heart: Regular rate and rhythm are appreciated, no murmurs or rubs are heard  Abdomen: Is soft, without organomegaly, bowel sounds are positive, there is no rebound or guarding and palpation does not " produce any discomfort  Back: Nontender without CVA tenderness  Pelvic: External genitalia appear normal and consistent with mature female.  BUS normal                            Vagina is clean dry without discharge and appears adequately estrogenized, no lesions or masses are present                         Cervix is noninflamed without discharge or lesions.  There is no cervical motion tenderness.                Uterus is nonenlarged, without tenderness, and no masses or abnormalities are  present               Adnexa are non-enlarged, non tender               Rectal exam reveals adequate sphincter tone and no masses or lesions are appreciated on digital rectal examination.      Annual Well Woman Exam  Patient Active Problem List   Diagnosis   • Migraine with aura and without status migrainosus, not intractable   • Stress                 Assessment & Plan   Diagnoses and all orders for this visit:    1. Encounter for gynecological examination (Primary)  -     IGP, Apt HPV,rfx 16 / 18,45    2. Screening for cervical cancer      Discussed today's findings and concerns with patient.  Continue to recommend regular exercise including cardiovascular and resistance training as well as  breast self-exam. Wellness lab, mammography, & pap smear, in accordance with age guidelines.    I have encouraged her to call for today's test results if she has not received them within 10 days.  Patient is advised to call with any change in her condition or with any other questions, otherwise return  for annual examination.

## 2022-08-03 LAB
CYTOLOGIST CVX/VAG CYTO: ABNORMAL
CYTOLOGY CVX/VAG DOC CYTO: ABNORMAL
CYTOLOGY CVX/VAG DOC THIN PREP: ABNORMAL
DX ICD CODE: ABNORMAL
HIV 1 & 2 AB SER-IMP: ABNORMAL
HPV I/H RISK 4 DNA CVX QL PROBE+SIG AMP: POSITIVE
HPV16 DNA CVX QL PROBE+SIG AMP: NEGATIVE
HPV18+45 E6+E7 MRNA CVX QL NAA+PROBE: NEGATIVE
Lab: ABNORMAL
OTHER STN SPEC: ABNORMAL
STAT OF ADQ CVX/VAG CYTO-IMP: ABNORMAL

## 2022-08-19 ENCOUNTER — TELEPHONE (OUTPATIENT)
Dept: FAMILY MEDICINE CLINIC | Facility: CLINIC | Age: 37
End: 2022-08-19

## 2022-08-19 NOTE — TELEPHONE ENCOUNTER
Okay for patient to establish, as I am already booked out for physicals through March she will need to wait until the spring for a new patient appointment.

## 2022-08-19 NOTE — TELEPHONE ENCOUNTER
THE PATIENT WOULD LIKE TO BECOME ESTABLISHED WITH DR. REED. THE PATIENT STATES THAT A GRANT WITHERS RECOMMENDED HER TO DR. REED. THE PATIENT STATES THAT SHE IS FRIENDS WITH A LOT OF DR. REED'S PATIENTS AND [employer] FRIENDS AND THEY ALL RECOMMENDED HER AS WELL. PLEASE ADVISE BY CALLING [phone].

## 2022-09-12 NOTE — PROGRESS NOTES
[address] [address]  Phone - [phone]      Chief Complaint:     Skip Bowens is a 15 year old male who is here for:    1. Viral URI with cough    2. Allergic rhinitis, unspecified seasonality, unspecified trigger      Here with mother  Was having some URI symptoms  Got better, was having more chest congestion   Dry cough but now having more productive cough  Cough is better  Able to get phlegm out  No fever, breathing issues    Does have year around allergies  - does well with daily Zyrtec and has Flonase to use as needed     History:     History reviewed. No pertinent past medical history.    Social History     Socioeconomic History   • Marital status: Single     Spouse name: Not on file   • Number of children: Not on file   • Years of education: Not on file   • Highest education level: Not on file   Occupational History   • Not on file   Tobacco Use   • Smoking status: Current Every Day Smoker   • Smokeless tobacco: Never Used   Substance and Sexual Activity   • Alcohol use: Not on file   • Drug use: Not on file   • Sexual activity: Not on file   Other Topics Concern   • Not on file   Social History Narrative   • Not on file     Social Determinants of Health     Financial Resource Strain: Not on file   Food Insecurity: Not on file   Transportation Needs: Not on file   Physical Activity: Not on file   Stress: Not on file   Social Connections: Not on file   Intimate Partner Violence: Not on file       History reviewed. No pertinent family history.      Review of Systems:     Negative except as noted above    Physical Examination:     Vital Signs:   Visit Vitals  /66   Pulse 79   Temp 98.5 °F (36.9 °C) (Oral)   Wt 73.4 kg (161 lb 12.8 oz)   SpO2 100%     General: Well-developed, well-nourished male , in no acute distress. Pt is alert and oriented x 3.   HEENT:   Ears-TMs without erythema, bulging, retraction. Canals without cerumen or  Subjective     Chief Complaint   Patient presents with   • Gynecologic Exam     Annual:last pap ,discuss baseline mammogram,increased itching in hairline         History of Present Illness      Stephanie Cobb is a very pleasant  36 y.o. female who presents for annual exam.  Mammo Exam none wants to wait until age 38, Contraception tubal, Exercise 4 times a week  Patient had intermittent itching underneath her breast and sometimes in her perilabial area.  No unusual discharge..      Obstetric History:  OB History        4    Para   4    Term   4            AB        Living   4       SAB        TAB        Ectopic        Molar        Multiple   0    Live Births   4               Menstrual History:     Patient's last menstrual period was 2021 (approximate).       Sexual History:       Past Medical History:   Diagnosis Date   • COVID-19 2021   • Headache, tension-type    • Hx of blood clots     Rt.leg,superficial   • Hypertension    • Migraine    • Urinary tract infection      Past Surgical History:   Procedure Laterality Date   • CYSTECTOMY      Left   • DENTAL PROCEDURE Left    • DIAGNOSTIC LAPAROSCOPY Left 2018    Procedure: DIAGNOSTIC LAPAROSCOPY/LT.OVARIAN CYSTECTOMY;  Surgeon: Juan Ramon Dias MD;  Location: [facility] OR;  Service: Obstetrics/Gynecology   • TUBAL ABDOMINAL LIGATION     • TUBAL COAGULATION LAPAROSCOPIC Bilateral 2019    Procedure: POSTPARTUM TUBAL LIGATION;  Surgeon: Juan Ramon Dias MD;  Location: [facility] LABOR DELIVERY;  Service: Obstetrics/Gynecology   • WISDOM TOOTH EXTRACTION         SOCIAL Hx:      The following portions of the patient's history were reviewed and updated as appropriate: allergies, current medications, past family history, past medical history, past social history, past surgical history and problem list.    Review of Systems        Except as outlined in history of physical illness, patient denies any changes in her GYN, , GI systems.  All other  "systems reviewed were negative.         Current Outpatient Medications:   •  Ascorbic Acid (VITAMIN C PO), Take  by mouth., Disp: , Rfl:   •  coenzyme Q10 100 MG capsule, Take 100 mg by mouth Daily., Disp: , Rfl:   •  Cyanocobalamin (B-12) 1000 MCG tablet, , Disp: , Rfl:   •  Erenumab-aooe (AIMOVIG, 140 MG DOSE, SC), Inject  under the skin into the appropriate area as directed., Disp: , Rfl:   •  ferric gluconate in sodium chloride 0.9 % 100 mL IVPB, Infuse  into a venous catheter 1 (One) Time., Disp: , Rfl:   •  Magnesium Oxide (Mag-Oxide) 200 MG tablet, , Disp: , Rfl:   •  Omega-3 Fatty Acids (FISH OIL) 1000 MG capsule capsule, Take  by mouth Daily With Breakfast., Disp: , Rfl:   •  Riboflavin 100 MG capsule, Take  by mouth., Disp: , Rfl:   •  rizatriptan (MAXALT) 10 MG tablet, Take 1 tablet by mouth As Needed for Migraine. Okay to repeat one time for persistent migraine. No more than 2 tabs in 24 hrs, Disp: 10 tablet, Rfl: 3  •  VITAMIN D PO, Take  by mouth., Disp: , Rfl:   •  clotrimazole-betamethasone (LOTRISONE) 1-0.05 % cream, Apply  topically to the appropriate area as directed 2 (Two) Times a Day. Apply to affected area twice daily 7 days, Disp: 45 g, Rfl: 1  •  fluconazole (Diflucan) 150 MG tablet, Take 1 tablet by mouth Every 72 (Seventy-Two) Hours As Needed (yeast)., Disp: 2 tablet, Rfl: 0   Objective   Physical Exam    /70   Ht 167.6 cm (66\")   Wt 83.5 kg (184 lb)   LMP 07/12/2021 (Approximate)   Breastfeeding No   BMI 29.70 kg/m²     General: Patient is alert and oriented and appears overall healthy  Neck: Is supple without thyromegaly, no carotid bruits and no lymphadenopathy  Lungs: Clear bilaterally, no wheezing, rhonchi, or rales.  Respiratory rate is normal  Breast: Even, symmetrical, no lymphadenopathy, no retraction, no masses or cysts  Heart: Regular rate and rhythm are appreciated, no murmurs or rubs are heard  Abdomen: Is soft, without organomegaly, bowel sounds are positive, there " drainage.   Sinuses-nontender to palpation or percussion.   Nose-no rhinorrhea, no swelling of the turbinates, septum midline.   Oropharynx-mucous membranes moist, posterior oropharynx with mild erythema  Neck: No cervical or submandibular or supraclavicular lymphadenopathy noted  Cardiovascular: Regular rate and rhythm without murmurs, rubs or gallops.   Lungs: Clear to auscultation bilaterally with good aeration and normal respiratory effort, without retractions appreciated.    Assessment and Plan:   1. Viral URI with cough  - home care discussed   - promethazine-dextromethorphan (PROMETHAZINE-DM) 6.25-15 MG/5ML syrup; Take 5 mLs by mouth 4 times daily as needed for Cough.  Dispense: 118 mL; Refill: 0  - school excuse letter given for today     2. Allergic rhinitis, unspecified seasonality, unspecified trigger  - daily Cetirizine, as needed Flonase   - albuterol 108 (90 Base) MCG/ACT inhaler; Inhale 2 puffs into the lungs every 4 hours as needed for Shortness of Breath or Wheezing.  Dispense: 1 each; Refill: 2      Follow up as needed    Instructions provided as documented in the after visit summary.    The patient and mother indicated understanding of the diagnosis and agreed with the plan of care.    Medication side effects, risk and benefits discussed. Patient is aware of that and is willing to take the medication.    Patient instructions. Advised the patient if symptoms get worse, develops any new symptoms, concerns, questions, problems, to give me a call or come in. If it is after hours the patient should call the on-call physician or go to the emergency room. The patient knows when to call us and when to go to the ER. Follow up with me after above testing. All plans discussed with the patient at length. The patient understands and agrees.    Any time labs are done,  please remember to check your lab results via [facility] 48 hrs after you get labs done or  to call our office if you have not gotten notice of  is no                                rebound or guarding and palpation does not produce any discomfort  Back: Nontender without CVA tenderness  Pelvic: External genitalia appear normal and consistent with mature female.  BUS normal                            Vagina is clean dry without discharge and appears adequately estrogenized, no               lesions or masses are present                         Cervix is noninflamed without discharge or lesions.  There is no cervical motion             tenderness.                Uterus is nonenlarged, without tenderness, and no masses or abnormalities are  present               Adnexa are non-enlarged, non tender               Rectal exam reveals adequate sphincter tone and no masses or lesions are                     appreciated on digital rectal examination.      Annual Well Woman Exam  Patient Active Problem List   Diagnosis   • Migraine with aura and without status migrainosus, not intractable   • Stress                 Assessment/Plan   Diagnoses and all orders for this visit:    1. Encounter for gynecological examination (Primary)  -     IGP, Apt HPV,rfx 16 / 18,45    2. Subacute vulvitis    Other orders  -     clotrimazole-betamethasone (LOTRISONE) 1-0.05 % cream; Apply  topically to the appropriate area as directed 2 (Two) Times a Day. Apply to affected area twice daily 7 days  Dispense: 45 g; Refill: 1      Discussed today's findings and concerns with patient.  Continue to recommend regular exercise including cardiovascular and resistance training as well as  breast self-exam. Wellness lab, mammography, & pap smear, in accordance with age guidelines.    I have encouraged her to call for today's test results if she has not received them within 10 days.  Patient is advised to call with any change in her condition or with any other questions, otherwise return  for annual examination.              your lab results within 2 weeks, thanks.    Continue medicines per Epic as applicable.    Please see After Visit Summary for other details of the remaining discussion.     Marcel De La Fuente MD  Family Practice

## 2022-09-19 DIAGNOSIS — G43.109 MIGRAINE WITH AURA AND WITHOUT STATUS MIGRAINOSUS, NOT INTRACTABLE: Primary | ICD-10-CM

## 2022-09-20 RX ORDER — RIZATRIPTAN BENZOATE 10 MG/1
TABLET ORAL
Qty: 10 TABLET | Refills: 11 | Status: SHIPPED | OUTPATIENT
Start: 2022-09-20

## 2023-03-08 ENCOUNTER — OFFICE VISIT (OUTPATIENT)
Dept: NEUROLOGY | Facility: CLINIC | Age: 38
End: 2023-03-08

## 2023-03-08 VITALS
HEART RATE: 73 BPM | OXYGEN SATURATION: 99 % | SYSTOLIC BLOOD PRESSURE: 110 MMHG | DIASTOLIC BLOOD PRESSURE: 74 MMHG | WEIGHT: 174 LBS | HEIGHT: 65 IN | BODY MASS INDEX: 28.99 KG/M2

## 2023-03-08 DIAGNOSIS — G43.109 MIGRAINE WITH AURA AND WITHOUT STATUS MIGRAINOSUS, NOT INTRACTABLE: Primary | ICD-10-CM

## 2023-03-08 PROCEDURE — 99213 OFFICE O/P EST LOW 20 MIN: CPT | Performed by: NURSE PRACTITIONER

## 2023-03-08 NOTE — PROGRESS NOTES
DOS: 3/8/2023  NAME: Stephanie Cobb   : 1985  PCP: Mary Kuhn APRN    Chief Complaint   Patient presents with   • Migraine      SUBJECTIVE  Neurological Problem:  37 y.o. RHW female with chronic migraines, h/o fluctuating BP, HLD and pulsatile tinnitus  who presents today for migraines.  She is unaccompanied.    Interval History:   **For previous history, please see progress note dated 2020.    Ms. Cobb has a h/o chronic migraine with aura doing well on increased dose of Aimovig 140 mg/ml for prevention and Maxalt/ibuprofen for abortive therapy.  She was last seen by me in 2021, had some complaints of worsening ringing in her ears, headache more on top of her head.  No family history of aneurysms; however an MRA of the head was ordered, done on 2021, reviewed and showed no evidence of aneurysms or stenosis.     She presents today and continues on Aimovig 140 mg/ml for preventive therapy and Maxalt 10 mg as needed for rescue.  She continues to do well on this regimen.  Mentions that she has somewhat of a 2-week cycle, headache free for 2 weeks and then several headaches in the next 2 weeks, she has not associated this with her menstrual cycle.  She denies any medication adverse side effects.  She denies any new neurologic symptoms.  Seldom has to use second dose of Maxalt.    Current preventive therapy: Aimovig 140 mg/ml   Current abortive treatment: Maxalt 10 mg  [facility]    Review of Systems:Review of Systems   Eyes: Positive for photophobia and visual disturbance. Negative for itching.   Gastrointestinal: Positive for nausea. Negative for diarrhea and vomiting.   Endocrine: Negative for cold intolerance, heat intolerance and polydipsia.   Musculoskeletal: Positive for neck pain and neck stiffness. Negative for back pain.   Allergic/Immunologic: Negative for environmental allergies, food allergies and immunocompromised state.   Neurological: Positive for  dizziness and headaches. Negative for tremors, seizures, syncope, facial asymmetry, speech difficulty, weakness, light-headedness and numbness.    Above ROS reviewed    The following portions of the patient's history were reviewed and updated as appropriate: allergies, current medications, past family history, past medical history, past social history, past surgical history and problem list.    Current Medications:   Current Outpatient Medications:   •  Ascorbic Acid (VITAMIN C PO), Take 1 tablet by mouth Daily., Disp: , Rfl:   •  Erenumab-aooe (AIMOVIG) 140 MG/ML prefilled syringe, Inject 1 mL under the skin into the appropriate area as directed Every 30 (Thirty) Days., Disp: 1 mL, Rfl: 0  •  ibuprofen (ADVIL,MOTRIN) 200 MG tablet, Take 1 tablet by mouth Every 6 (Six) Hours As Needed for Mild Pain. HELD FOR OR, Disp: , Rfl:   •  ondansetron (Zofran) 4 MG tablet, Take 1 tablet by mouth Daily As Needed for Nausea or Vomiting., Disp: 30 tablet, Rfl: 1  •  promethazine (PHENERGAN) 12.5 MG tablet, Take 1 tablet by mouth Every 6 (Six) Hours As Needed for Nausea or Vomiting., Disp: 20 tablet, Rfl: 0  •  rizatriptan (MAXALT) 10 MG tablet, TAKE 1 TABLET BY MOUTH AT THE ONSET OF HEADACHE, MAY REPEAT 1 DOSE IN 2 HOURS IF HEADACHE IS NOT RESOLVED. MAX DAILY AMOUNT IS 2 TABLETS., Disp: 10 tablet, Rfl: 11  **I did not stop or change the above medications.  Patient's medication list was updated to reflect medications they have reported as currently taking, including medication changes made by other providers.    OBJECTIVE  Vitals:    03/08/23 0723   BP: 110/74   Pulse: 73   SpO2: 99%     Body mass index is 28.96 kg/m².    Diagnostics:    Laboratory Results:         Lab Results   Component Value Date    WBC 3.60 07/18/2022    HGB 12.5 07/18/2022    HCT 37.5 07/18/2022    MCV 91.5 07/18/2022     07/18/2022     Lab Results   Component Value Date    GLUCOSE 101 (H) 06/24/2020    BUN 11 04/01/2021    CREATININE 0.6 (L)  04/01/2021    EGFRIFNONA 100 06/24/2020    EGFRIFAFRI 121 06/24/2020    BCR 20.0 04/01/2021    K 4.7 04/01/2021    CO2 24 04/01/2021    CALCIUM 9.3 04/01/2021    PROTENTOTREF 7.0 06/24/2020    ALBUMIN 4.5 04/01/2021    LABIL2 1.5 04/01/2021    AST 18 04/01/2021    ALT 14 04/01/2021     No results found for: HGBA1C  No results found for: CHOL  Lab Results   Component Value Date    HDL 42 (L) 10/05/2020    HDL 43 06/24/2020     Lab Results   Component Value Date     (H) 10/05/2020     (H) 06/24/2020     Lab Results   Component Value Date    TRIG 96 10/05/2020    TRIG 111 06/24/2020     Lab Results   Component Value Date    RPR Non-Reactive 02/10/2016     Lab Results   Component Value Date    TSH 1.210 04/01/2021     Lab Results   Component Value Date    [id number] 385 10/05/2020       Physical Exam:  GENERAL: NAD  HEENT: Normocephalic, atraumatic   COR: RRR  Resp: Even and unlabored  Extremities: No signs of distal embolization.   Skin: No rashes, lesions or ulcers.  Psychiatric: Normal mood and affect.    Neurological:   MS: AO. Language normal. No neglect. Follows all commands.  CN: II-XII grossly normal  Motor: Normal strength and tone throughout.  Sensory: Intact to light touch in arms and legs  Station and Gait: Normal gait and station.    Coordination: Normal finger to nose bilaterally    Impression/Plan:  1. Migraines with aura  Aimovig 140 mg monthly for prevention  Maxalt 10 mg as needed for rescue  Lifestyle modifications reviewed   Follow-up in 6 months with Marcell De La Rosa sooner if symptoms warrant.    Diagnoses and all orders for this visit:    1. Migraine with aura and without status migrainosus, not intractable (Primary)        Coding      Dictated using Dragon

## 2023-04-06 ENCOUNTER — TELEPHONE (OUTPATIENT)
Dept: NEUROLOGY | Facility: CLINIC | Age: 38
End: 2023-04-06

## 2023-04-06 NOTE — TELEPHONE ENCOUNTER
SPOKE WITH PATIENT AND INFORMED HER THAT [facility] ARE NEEDING PROOF OF INCOME FAXED TO THEM BEFORE THEY CAN PROCESS HER APPLICATION FOR PATIENT ASSISTANCE FOR AIMOVIG    PATIENT STATES THAT SHE IS AWARE AND WILL BE SUBMITTING THAT DOCUMENTATION

## 2023-04-14 ENCOUNTER — OFFICE VISIT (OUTPATIENT)
Dept: FAMILY MEDICINE CLINIC | Facility: CLINIC | Age: 38
End: 2023-04-14

## 2023-04-14 VITALS
BODY MASS INDEX: 28.99 KG/M2 | SYSTOLIC BLOOD PRESSURE: 108 MMHG | WEIGHT: 174 LBS | HEART RATE: 72 BPM | HEIGHT: 65 IN | OXYGEN SATURATION: 98 % | TEMPERATURE: 97.6 F | DIASTOLIC BLOOD PRESSURE: 78 MMHG

## 2023-04-14 DIAGNOSIS — E04.9 THYROID ENLARGEMENT: ICD-10-CM

## 2023-04-14 DIAGNOSIS — I49.9 IRREGULAR HEARTBEAT: ICD-10-CM

## 2023-04-14 DIAGNOSIS — R03.0 ELEVATED BLOOD PRESSURE READING IN OFFICE WITHOUT DIAGNOSIS OF HYPERTENSION: Primary | ICD-10-CM

## 2023-04-14 DIAGNOSIS — R42 DIZZINESS: ICD-10-CM

## 2023-04-14 PROBLEM — M75.120 FULL THICKNESS ROTATOR CUFF TEAR: Status: RESOLVED | Noted: 2022-07-15 | Resolved: 2023-04-14

## 2023-04-14 PROBLEM — M19.019 OSTEOARTHRITIS OF ACROMIOCLAVICULAR JOINT: Status: ACTIVE | Noted: 2022-07-15

## 2023-04-14 PROBLEM — S43.439A GLENOID LABRUM TEAR: Status: ACTIVE | Noted: 2022-07-15

## 2023-04-14 PROBLEM — K21.9 GASTROESOPHAGEAL REFLUX DISEASE WITHOUT ESOPHAGITIS: Status: RESOLVED | Noted: 2020-02-19 | Resolved: 2023-04-14

## 2023-04-14 PROBLEM — M75.120 FULL THICKNESS ROTATOR CUFF TEAR: Status: ACTIVE | Noted: 2022-07-15

## 2023-04-14 PROBLEM — K21.9 GASTROESOPHAGEAL REFLUX DISEASE WITHOUT ESOPHAGITIS: Status: ACTIVE | Noted: 2020-02-19

## 2023-04-14 PROBLEM — S43.016A ANTERIOR SHOULDER DISLOCATION: Status: ACTIVE | Noted: 2022-07-01

## 2023-04-14 NOTE — PROGRESS NOTES
"Chief Complaint  Establish Care (New pt establishing today with dr enriquez ), Migraine ( No complains doing well ), and Irregular Heart Beat (Low heart rate and has feeling is missing beat sometimes  her bp is irregular too  some days 140/90 and then another weeks con go 110/70)    Subjective        Stephanie Cobb presents to [facility]  History of Present Illness  Pleasant 37-year-old female here as a new patient.  She is here to establish care, she does have known chronic migraines, seen by neurology.  Currently on Aimovig 140 mg monthly and Maxalt as needed.  She does use Phenergan and Zofran as well.    She has been having trouble with irregular heartbeats, her blood pressure does oscillate between normal and elevated blood pressures.  Has been present since birth of her son, has had BPs 160/120 after post partum. Has episodes of it coming up and down and she cn feel the fluctuations. EKG in 2018 and Holter monitor in 2018 - she did have SVT, 67 PACs.  When walking her heart rt will be 179 with walking with dizziness,  She will get a HR that will very from 79 to 52.     Objective   Vital Signs:  /78   Pulse 72   Temp 97.6 °F (36.4 °C)   Ht 165.1 cm (65\")   Wt 78.9 kg (174 lb)   SpO2 98%   BMI 28.96 kg/m²   Estimated body mass index is 28.96 kg/m² as calculated from the following:    Height as of this encounter: 165.1 cm (65\").    Weight as of this encounter: 78.9 kg (174 lb).             Physical Exam  Vitals and nursing note reviewed.   Constitutional:       General: She is not in acute distress.     Appearance: She is well-developed.   HENT:      Head: Normocephalic.      Nose: Nose normal.   Cardiovascular:      Rate and Rhythm: Normal rate and regular rhythm.      Heart sounds: Normal heart sounds. No murmur heard.  Pulmonary:      Effort: Pulmonary effort is normal. No respiratory distress.      Breath sounds: Normal breath sounds.   Musculoskeletal:         " General: Normal range of motion.   Skin:     General: Skin is warm and dry.      Findings: No rash.   Neurological:      Mental Status: She is alert and oriented to person, place, and time.   Psychiatric:         Behavior: Behavior normal.         Thought Content: Thought content normal.         Judgment: Judgment normal.        Result Review :              ECG 12 Lead    Date/Time: 4/14/2023 10:25 AM  Performed by: Joana Ray MD  Authorized by: Joana Ray MD   Rhythm: sinus rhythm  Rate: normal  Conduction: conduction normal  ST Segments: ST segments normal  T Waves: T waves normal  QRS axis: normal  Other: no other findings    Clinical impression: abnormal EKG  Comments: Borderline prolonged OR interval              Assessment and Plan   Diagnoses and all orders for this visit:    1. Elevated blood pressure reading in office without diagnosis of hypertension (Primary)  -     Lipid Panel    2. Irregular heartbeat  -     ECG 12 Lead  -     Comprehensive Metabolic Panel  -     CBC & Differential  -     Cancel: TSH Rfx On Abnormal To Free T4  -     Ferritin  -     Thyroid Antibodies  -     TSH  -     T4, free  -     Holter Monitor - 72 Hour Up To 15 Days; Future  -     Adult Transthoracic Echo Complete W/ Cont if Necessary Per Protocol; Future    3. Thyroid enlargement  -     US Thyroid; Future    4. Dizziness  -     Holter Monitor - 72 Hour Up To 15 Days; Future  -     Adult Transthoracic Echo Complete W/ Cont if Necessary Per Protocol; Future    HTN:goal <130/90, within range today continue to monitor at home.  She does have some labile blood pressures.  We will get a better trend of this over time.    Irregular heartbeat that is been present for years, she did have a Holter monitor in 2018 that showed SVT and 68 runs of PACs.  Her symptoms do seem to be progressing, she has had some heart rate sustained at 170 and some at 140.  She does have a family history of A-fib, EKG which does show some borderline  intra atrial conduction delay.  We discussed that it probably would be ideal to start cardiac work-up we will get Holter monitor and echocardiogram as above, depending on findings will likely need to see cardiology.  For now she will continue to monitor symptoms, limit caffeine intake, and maintain good hydration.         Follow Up   Return in about 6 weeks (around 5/26/2023), or if symptoms worsen or fail to improve, for Recheck irregular heart rate .  Patient was given instructions and counseling regarding her condition or for health maintenance advice. Please see specific information pulled into the AVS if appropriate.     Joana Ray MD

## 2023-04-14 NOTE — Clinical Note
Do mind calling patient to let her know that the EKG is borderline abnormal, there does seem to be some slowing of the electrical impulse between the top and the bottom heart as we discussed.  I would like to get an echocardiogram with these findings as well.  I wanted to make her aware.

## 2023-04-21 ENCOUNTER — PATIENT ROUNDING (BHMG ONLY) (OUTPATIENT)
Dept: FAMILY MEDICINE CLINIC | Facility: CLINIC | Age: 38
End: 2023-04-21

## 2023-04-21 LAB
ALBUMIN SERPL-MCNC: 4.6 G/DL (ref 3.5–5.2)
ALBUMIN/GLOB SERPL: 1.9 G/DL
ALP SERPL-CCNC: 44 U/L (ref 39–117)
ALT SERPL-CCNC: 10 U/L (ref 1–33)
AST SERPL-CCNC: 10 U/L (ref 1–32)
BASOPHILS # BLD AUTO: 0.02 10*3/MM3 (ref 0–0.2)
BASOPHILS NFR BLD AUTO: 0.5 % (ref 0–1.5)
BILIRUB SERPL-MCNC: 0.4 MG/DL (ref 0–1.2)
BUN SERPL-MCNC: 9 MG/DL (ref 6–20)
BUN/CREAT SERPL: 12.9 (ref 7–25)
CALCIUM SERPL-MCNC: 9.5 MG/DL (ref 8.6–10.5)
CHLORIDE SERPL-SCNC: 105 MMOL/L (ref 98–107)
CHOLEST SERPL-MCNC: 213 MG/DL (ref 0–200)
CO2 SERPL-SCNC: 25.1 MMOL/L (ref 22–29)
CREAT SERPL-MCNC: 0.7 MG/DL (ref 0.57–1)
EGFRCR SERPLBLD CKD-EPI 2021: 113 ML/MIN/1.73
EOSINOPHIL # BLD AUTO: 0.05 10*3/MM3 (ref 0–0.4)
EOSINOPHIL NFR BLD AUTO: 1.4 % (ref 0.3–6.2)
ERYTHROCYTE [DISTWIDTH] IN BLOOD BY AUTOMATED COUNT: 12.1 % (ref 12.3–15.4)
FERRITIN SERPL-MCNC: 53 NG/ML (ref 13–150)
GLOBULIN SER CALC-MCNC: 2.4 GM/DL
GLUCOSE SERPL-MCNC: 88 MG/DL (ref 65–99)
HCT VFR BLD AUTO: 38.4 % (ref 34–46.6)
HDLC SERPL-MCNC: 44 MG/DL (ref 40–60)
HGB BLD-MCNC: 13 G/DL (ref 12–15.9)
IMM GRANULOCYTES # BLD AUTO: 0.01 10*3/MM3 (ref 0–0.05)
IMM GRANULOCYTES NFR BLD AUTO: 0.3 % (ref 0–0.5)
LDLC SERPL CALC-MCNC: 152 MG/DL (ref 0–100)
LYMPHOCYTES # BLD AUTO: 1.66 10*3/MM3 (ref 0.7–3.1)
LYMPHOCYTES NFR BLD AUTO: 44.9 % (ref 19.6–45.3)
MCH RBC QN AUTO: 31.3 PG (ref 26.6–33)
MCHC RBC AUTO-ENTMCNC: 33.9 G/DL (ref 31.5–35.7)
MCV RBC AUTO: 92.3 FL (ref 79–97)
MONOCYTES # BLD AUTO: 0.28 10*3/MM3 (ref 0.1–0.9)
MONOCYTES NFR BLD AUTO: 7.6 % (ref 5–12)
NEUTROPHILS # BLD AUTO: 1.68 10*3/MM3 (ref 1.7–7)
NEUTROPHILS NFR BLD AUTO: 45.3 % (ref 42.7–76)
NRBC BLD AUTO-RTO: 0 /100 WBC (ref 0–0.2)
PLATELET # BLD AUTO: 278 10*3/MM3 (ref 140–450)
POTASSIUM SERPL-SCNC: 4.4 MMOL/L (ref 3.5–5.2)
PROT SERPL-MCNC: 7 G/DL (ref 6–8.5)
RBC # BLD AUTO: 4.16 10*6/MM3 (ref 3.77–5.28)
SODIUM SERPL-SCNC: 140 MMOL/L (ref 136–145)
TRIGL SERPL-MCNC: 94 MG/DL (ref 0–150)
VLDLC SERPL CALC-MCNC: 17 MG/DL (ref 5–40)
WBC # BLD AUTO: 3.7 10*3/MM3 (ref 3.4–10.8)

## 2023-04-21 NOTE — PROGRESS NOTES
April 21, 2023    Hello, may I speak with Stephanie Cobb?    My name is Brooklynn Boo      [facility] am  [facility]  [address] [place] [id number] [phone].    Before we get started may I verify your date of birth? 1985    I am calling to officially welcome you to our practice and ask about your recent visit. Is this a good time to talk? No, my chart message sent

## 2023-04-24 LAB
T4 FREE SERPL-MCNC: 1.3 NG/DL (ref 0.93–1.7)
THYROGLOB AB SERPL-ACNC: <1 IU/ML (ref 0–0.9)
THYROPEROXIDASE AB SERPL-ACNC: <9 IU/ML (ref 0–34)
TSH SERPL DL<=0.005 MIU/L-ACNC: 1.21 UIU/ML (ref 0.27–4.2)

## 2023-04-26 ENCOUNTER — HOSPITAL ENCOUNTER (OUTPATIENT)
Dept: CARDIOLOGY | Facility: HOSPITAL | Age: 38
Discharge: HOME | End: 2023-04-26
Payer: MEDICAID

## 2023-04-26 VITALS — WEIGHT: 174 LBS | BODY MASS INDEX: 28.99 KG/M2 | HEIGHT: 65 IN

## 2023-04-26 DIAGNOSIS — I49.9 IRREGULAR HEARTBEAT: ICD-10-CM

## 2023-04-26 DIAGNOSIS — R42 DIZZINESS: ICD-10-CM

## 2023-04-26 LAB
AORTIC DIMENSIONLESS INDEX: 0.9 (DI)
BH CV ECHO MEAS - ACS: 2.26 CM
BH CV ECHO MEAS - AO MAX PG: 3.6 MMHG
BH CV ECHO MEAS - AO MEAN PG: 2.17 MMHG
BH CV ECHO MEAS - AO ROOT DIAM: 3.5 CM
BH CV ECHO MEAS - AO V2 MAX: 95.3 CM/SEC
BH CV ECHO MEAS - AO V2 VTI: 17.4 CM
BH CV ECHO MEAS - AVA(I,D): 3.2 CM2
BH CV ECHO MEAS - EDV(CUBED): 67.8 ML
BH CV ECHO MEAS - EDV(MOD-SP2): 68 ML
BH CV ECHO MEAS - EDV(MOD-SP4): 69 ML
BH CV ECHO MEAS - EF(MOD-BP): 58.2 %
BH CV ECHO MEAS - EF(MOD-SP2): 57.4 %
BH CV ECHO MEAS - EF(MOD-SP4): 59.4 %
BH CV ECHO MEAS - ESV(CUBED): 21.8 ML
BH CV ECHO MEAS - ESV(MOD-SP2): 29 ML
BH CV ECHO MEAS - ESV(MOD-SP4): 28 ML
BH CV ECHO MEAS - FS: 31.5 %
BH CV ECHO MEAS - IVS/LVPW: 1.08 CM
BH CV ECHO MEAS - IVSD: 0.8 CM
BH CV ECHO MEAS - LV DIASTOLIC VOL/BSA (35-75): 37 CM2
BH CV ECHO MEAS - LV MASS(C)D: 91.4 GRAMS
BH CV ECHO MEAS - LV MAX PG: 3.7 MMHG
BH CV ECHO MEAS - LV MEAN PG: 2.17 MMHG
BH CV ECHO MEAS - LV SYSTOLIC VOL/BSA (12-30): 15 CM2
BH CV ECHO MEAS - LV V1 MAX: 96.8 CM/SEC
BH CV ECHO MEAS - LV V1 VTI: 16.5 CM
BH CV ECHO MEAS - LVIDD: 4.1 CM
BH CV ECHO MEAS - LVIDS: 2.8 CM
BH CV ECHO MEAS - LVOT AREA: 3.4 CM2
BH CV ECHO MEAS - LVOT DIAM: 2.07 CM
BH CV ECHO MEAS - LVPWD: 0.74 CM
BH CV ECHO MEAS - MV A DUR: 0.12 SEC
BH CV ECHO MEAS - MV A MAX VEL: 72.8 CM/SEC
BH CV ECHO MEAS - MV DEC SLOPE: 256.7 CM/SEC2
BH CV ECHO MEAS - MV DEC TIME: 0.24 MSEC
BH CV ECHO MEAS - MV E MAX VEL: 61.7 CM/SEC
BH CV ECHO MEAS - MV E/A: 0.85
BH CV ECHO MEAS - PA ACC TIME: 0.07 SEC
BH CV ECHO MEAS - PA PR(ACCEL): 48.1 MMHG
BH CV ECHO MEAS - PA V2 MAX: 84.4 CM/SEC
BH CV ECHO MEAS - PULM A REVS DUR: 0.14 SEC
BH CV ECHO MEAS - PULM A REVS VEL: 47.4 CM/SEC
BH CV ECHO MEAS - PULM DIAS VEL: 93.1 CM/SEC
BH CV ECHO MEAS - PULM S/D: 0.69
BH CV ECHO MEAS - PULM SYS VEL: 64 CM/SEC
BH CV ECHO MEAS - RV MAX PG: 1.79 MMHG
BH CV ECHO MEAS - RV V1 MAX: 66.8 CM/SEC
BH CV ECHO MEAS - RV V1 VTI: 13 CM
BH CV ECHO MEAS - SI(MOD-SP2): 20.9 ML/M2
BH CV ECHO MEAS - SI(MOD-SP4): 22 ML/M2
BH CV ECHO MEAS - SV(LVOT): 55.4 ML
BH CV ECHO MEAS - SV(MOD-SP2): 39 ML
BH CV ECHO MEAS - SV(MOD-SP4): 41 ML
BH CV ECHO MEAS - TAPSE (>1.6): 2 CM
BH CV XLRA - RV BASE: 3.3 CM
BH CV XLRA - RV MID: 2.7 CM
LEFT ATRIUM VOLUME INDEX: 17.2 ML/M2
MAXIMAL PREDICTED HEART RATE: 181 BPM
MAXIMAL PREDICTED HEART RATE: 181 BPM
STRESS TARGET HR: 154 BPM
STRESS TARGET HR: 154 BPM

## 2023-04-26 PROCEDURE — 93306 TTE W/DOPPLER COMPLETE: CPT | Performed by: INTERNAL MEDICINE

## 2023-04-26 PROCEDURE — 93306 TTE W/DOPPLER COMPLETE: CPT

## 2023-04-26 PROCEDURE — 93246 EXT ECG>7D<15D RECORDING: CPT

## 2023-04-28 NOTE — PROGRESS NOTES
Please call patient back with results.  The echocardiogram has resulted as normal squeezing and slightly impaired relaxing function of the heart, no fluid surrounding the heart.  It appears that there is a redundant wall between the 2 top chambers of the heart.  I am not honestly quite certain about the significance of this?  I do not think it is problematic but I do think there could be value in consulting a cardiologist to make sure they do not see any problems with this.  When looking at the different valves, they do look structurally normal, a couple of them have a little bit of leaking but do not seem to be clinically significant.      I do think it would be helpful to consult a cardiologist if you are in agreement.  Mainly because I cannot completely explain all of the findings with an echocardiogram.      Please let us know if you have further questions.     Thank you

## 2023-05-05 DIAGNOSIS — R93.1 ABNORMAL ULTRASOUND CARDIOGRAM: Primary | ICD-10-CM

## 2023-05-18 LAB
MAXIMAL PREDICTED HEART RATE: 181 BPM
STRESS TARGET HR: 154 BPM

## 2023-05-23 ENCOUNTER — OFFICE VISIT (OUTPATIENT)
Dept: FAMILY MEDICINE CLINIC | Facility: CLINIC | Age: 38
End: 2023-05-23
Payer: MEDICAID

## 2023-05-23 VITALS
SYSTOLIC BLOOD PRESSURE: 110 MMHG | HEART RATE: 75 BPM | DIASTOLIC BLOOD PRESSURE: 72 MMHG | WEIGHT: 173 LBS | BODY MASS INDEX: 28.82 KG/M2 | OXYGEN SATURATION: 100 % | HEIGHT: 65 IN | TEMPERATURE: 97.8 F

## 2023-05-23 DIAGNOSIS — G43.109 MIGRAINE WITH AURA AND WITHOUT STATUS MIGRAINOSUS, NOT INTRACTABLE: ICD-10-CM

## 2023-05-23 DIAGNOSIS — I49.9 IRREGULAR HEARTBEAT: Primary | ICD-10-CM

## 2023-05-23 DIAGNOSIS — R42 DIZZINESS: ICD-10-CM

## 2023-05-23 PROCEDURE — 99214 OFFICE O/P EST MOD 30 MIN: CPT | Performed by: FAMILY MEDICINE

## 2023-05-23 NOTE — PROGRESS NOTES
"Chief Complaint  Irregular Heart Beat (Follow up ,doing so much better ,when hr goes up is chest discomfort starts )    Subjective        Stephanie Cobb presents to [facility]  History of Present Illness  Pleasant 37-year-old female here with symptoms of palpitations that are associated with dizziness.  She has had 2 episodes of significant palpitations when she is walking for a longer period of time, both of these episodes she has had more pronounced dizziness and chest pain that is different than her more muscular chest pain that we have been able to attribute not to the heart.  She does have a family history of A-fib with maternal grandmother, mother does not go to the doctor but she highly suspects A-fib.    Objective   Vital Signs:  /72   Pulse 75   Temp 97.8 °F (36.6 °C)   Ht 165.1 cm (65\")   Wt 78.5 kg (173 lb)   SpO2 100%   BMI 28.79 kg/m²   Estimated body mass index is 28.79 kg/m² as calculated from the following:    Height as of this encounter: 165.1 cm (65\").    Weight as of this encounter: 78.5 kg (173 lb).             Physical Exam  Vitals and nursing note reviewed.   Constitutional:       General: She is not in acute distress.     Appearance: She is well-developed.   HENT:      Head: Normocephalic.      Nose: Nose normal.   Cardiovascular:      Rate and Rhythm: Normal rate and regular rhythm.      Heart sounds: Normal heart sounds. No murmur heard.  Pulmonary:      Effort: Pulmonary effort is normal. No respiratory distress.      Breath sounds: Normal breath sounds.   Musculoskeletal:         General: Normal range of motion.   Skin:     General: Skin is warm and dry.      Findings: No rash.   Neurological:      Mental Status: She is alert and oriented to person, place, and time.   Psychiatric:         Behavior: Behavior normal.         Thought Content: Thought content normal.         Judgment: Judgment normal.        Result Review :                 "   Assessment and Plan   Diagnoses and all orders for this visit:    1. Irregular heartbeat (Primary)    2. Dizziness    3. Migraine with aura and without status migrainosus, not intractable    Very pleasant 37-year-old female here to discuss a  years of irregular heartbeat and dizziness with a couple episodes of pain on exertion.  She does have musculoskeletal pain which she attributes to rebuilding muscles after her shoulder replacement.    We discussed her recent cardiac testing and labs,  she does have PVCs and PACs on the Holter monitor, no SVT although some of her recorded heart rates into the upper 160s could be SVT.  She does have a strong family history of A-fib with her grandmother and what she suspects is her mom he does not like going to the doctor.  She is working to eat healthfully, we discussed regular exercise limiting caffeine.    She does have an appointment coming up in a couple weeks with a cardiologist.  I think that would be helpful to get their input.  There may be some benefit to consider a beta-blocker but she would prefer not to be on a medication if its only can improve her symptoms.  If it would decrease her risk of getting A-fib she would consider this.       I spent 32 minutes caring for Stephanie on this date of service. This time includes time spent by me in the following activities:preparing for the visit, reviewing tests, performing a medically appropriate examination and/or evaluation , counseling and educating the patient/family/caregiver, referring and communicating with other health care professionals  and documenting information in the medical record  Follow Up   Return in about 1 year (around 5/23/2024), or if symptoms worsen or fail to improve, for Annual Physical with fasting labs prior.  Patient was given instructions and counseling regarding her condition or for health maintenance advice. Please see specific information pulled into the AVS if appropriate.     Joana Ray,  MD

## 2023-05-24 ENCOUNTER — TELEPHONE (OUTPATIENT)
Dept: FAMILY MEDICINE CLINIC | Facility: CLINIC | Age: 38
End: 2023-05-24
Payer: MEDICAID

## 2023-05-24 DIAGNOSIS — Z13.1 SCREENING FOR DIABETES MELLITUS: ICD-10-CM

## 2023-05-24 DIAGNOSIS — Z13.29 SCREENING FOR THYROID DISORDER: ICD-10-CM

## 2023-05-24 DIAGNOSIS — Z13.0 SCREENING, ANEMIA, DEFICIENCY, IRON: ICD-10-CM

## 2023-05-24 DIAGNOSIS — Z13.220 SCREENING FOR LIPID DISORDERS: Primary | ICD-10-CM

## 2023-05-24 NOTE — TELEPHONE ENCOUNTER
[facility] Estimates called, may clinical place lab orders when possible, to proceed with estimate. Please advise.

## 2023-05-26 DIAGNOSIS — Z13.1 SCREENING FOR DIABETES MELLITUS: ICD-10-CM

## 2023-05-26 DIAGNOSIS — Z13.0 SCREENING, ANEMIA, DEFICIENCY, IRON: ICD-10-CM

## 2023-05-26 DIAGNOSIS — Z13.29 SCREENING FOR THYROID DISORDER: ICD-10-CM

## 2023-05-26 DIAGNOSIS — Z13.220 SCREENING FOR LIPID DISORDERS: ICD-10-CM

## 2023-06-05 ENCOUNTER — OFFICE VISIT (OUTPATIENT)
Dept: CARDIOLOGY | Facility: CLINIC | Age: 38
End: 2023-06-05

## 2023-06-05 VITALS
HEIGHT: 65 IN | BODY MASS INDEX: 29.49 KG/M2 | SYSTOLIC BLOOD PRESSURE: 128 MMHG | WEIGHT: 177 LBS | HEART RATE: 77 BPM | DIASTOLIC BLOOD PRESSURE: 84 MMHG | OXYGEN SATURATION: 100 %

## 2023-06-05 DIAGNOSIS — I49.3 PREMATURE VENTRICULAR CONTRACTIONS (PVCS) (VPCS): Primary | ICD-10-CM

## 2023-06-05 PROCEDURE — 99244 OFF/OP CNSLTJ NEW/EST MOD 40: CPT | Performed by: INTERNAL MEDICINE

## 2023-06-06 PROBLEM — I49.3 PREMATURE VENTRICULAR CONTRACTIONS (PVCS) (VPCS): Status: ACTIVE | Noted: 2023-06-06

## 2023-06-06 NOTE — PROGRESS NOTES
"      CARDIOLOGY    Tha Harrison MD    ENCOUNTER DATE:  06/05/2023    Stephanie Cobb / 37 y.o. / female        CHIEF COMPLAINT / REASON FOR OFFICE VISIT     Abnormal ECG      HISTORY OF PRESENT ILLNESS       HPI  Stephanie Cobb is a 37 y.o. female who presents today for consultation.  Patient been having episodes of palpitations.  She said this goes back to about 2016.  At one point she had a monitor and was told she had some PACs and PVCs.  She also recently had a monitor on 5/18/2023 in which again it showed some PACs and PVCs.  Patient also had an echocardiogram done on 4/26/2023.  There was several findings there that she was concerned about so reviewed the findings of her echocardiogram.      The following portions of the patient's history were reviewed and updated as appropriate: allergies, current medications, past family history, past medical history, past social history, past surgical history and problem list.      VITAL SIGNS     Visit Vitals  /84 (BP Location: Left arm)   Pulse 77   Ht 165.1 cm (65\")   Wt 80.3 kg (177 lb)   SpO2 100%   BMI 29.45 kg/m²         Wt Readings from Last 3 Encounters:   06/05/23 80.3 kg (177 lb)   05/23/23 78.5 kg (173 lb)   04/26/23 78.9 kg (174 lb)     Body mass index is 29.45 kg/m².      REVIEW OF SYSTEMS   ROS        PHYSICAL EXAMINATION     Vitals reviewed.   Constitutional:       Appearance: Healthy appearance.   Pulmonary:      Effort: Pulmonary effort is normal.   Cardiovascular:      Normal rate. Regular rhythm. Normal S1. Normal S2.       Murmurs: There is no murmur.      No gallop.  No click. No rub.   Pulses:     Intact distal pulses.   Edema:     Peripheral edema absent.   Neurological:      Mental Status: Alert and oriented to person, place and time.         REVIEWED DATA     Procedures    Cardiac Procedures:      Lipid Panel          4/21/2023    10:21   Lipid Panel   Total Cholesterol 213    Triglycerides 94    HDL Cholesterol 44    VLDL Cholesterol " 17    LDL Cholesterol  152          ASSESSMENT & PLAN      Diagnosis Plan   1. Premature ventricular contractions (PVCs) (VPCs)              SUMMARY/DISCUSSION  Patient with both PACs and PVCs.  Patient had a essentially normal echo.  She was called grade 1 diastolic dysfunction although she is not having any symptoms she exercises on a routine basis.  Therefore patient clinically does not have grade 1 diastolic dysfunction.  She is having episodes where her Apple Watch is showing that her heart rates going up to 160s 170 beats a minute.  This is probably the most concerning thing she said.  She is going download rhythm strips when she has further episodes and send them to me and we will review them from there.  I did review her echocardiogram findings are essentially not remarkable.  I did explain everything to her to the best of my ability.        MEDICATIONS         Discharge Medications            Accurate as of June 5, 2023 11:59 PM. If you have any questions, ask your nurse or doctor.                Continue These Medications        Instructions Start Date   Erenumab-aooe 140 MG/ML auto-injector  Commonly known as: AIMOVIG   140 mg, Subcutaneous, Every 30 Days      ibuprofen 200 MG tablet  Commonly known as: ADVIL,MOTRIN   200 mg, Oral, Every 6 Hours PRN, HELD FOR OR      promethazine 12.5 MG tablet  Commonly known as: PHENERGAN   12.5 mg, Oral, Every 6 Hours PRN      rizatriptan 10 MG tablet  Commonly known as: MAXALT   TAKE 1 TABLET BY MOUTH AT THE ONSET OF HEADACHE, MAY REPEAT 1 DOSE IN 2 HOURS IF HEADACHE IS NOT RESOLVED. MAX DAILY AMOUNT IS 2 TABLETS.      VITAMIN C PO   1 tablet, Oral, Daily                   **Ilyaon Disclaimer:   Much of this encounter note is an electronic transcription/translation of spoken language to printed text. The electronic translation of spoken language may permit erroneous, or at times, nonsensical words or phrases to be inadvertently transcribed. Although I have reviewed  the note for such errors, some may still exist.

## 2023-08-01 ENCOUNTER — OFFICE VISIT (OUTPATIENT)
Dept: NEUROLOGY | Facility: CLINIC | Age: 38
End: 2023-08-01
Payer: MEDICAID

## 2023-08-01 VITALS
BODY MASS INDEX: 29.45 KG/M2 | DIASTOLIC BLOOD PRESSURE: 74 MMHG | HEART RATE: 76 BPM | OXYGEN SATURATION: 99 % | HEIGHT: 65 IN | SYSTOLIC BLOOD PRESSURE: 122 MMHG

## 2023-08-01 DIAGNOSIS — G43.109 MIGRAINE WITH AURA AND WITHOUT STATUS MIGRAINOSUS, NOT INTRACTABLE: Primary | ICD-10-CM

## 2023-08-01 DIAGNOSIS — H92.01 POSTERIOR AURICULAR PAIN, RIGHT: ICD-10-CM

## 2023-08-01 PROCEDURE — 99214 OFFICE O/P EST MOD 30 MIN: CPT | Performed by: NURSE PRACTITIONER

## 2023-08-01 RX ORDER — RIZATRIPTAN BENZOATE 10 MG/1
TABLET ORAL
Qty: 10 TABLET | Refills: 11 | Status: SHIPPED | OUTPATIENT
Start: 2023-08-01

## 2023-08-01 RX ORDER — PROMETHAZINE HYDROCHLORIDE 12.5 MG/1
12.5 TABLET ORAL EVERY 6 HOURS PRN
Qty: 20 TABLET | Refills: 0 | Status: SHIPPED | OUTPATIENT
Start: 2023-08-01

## 2023-08-02 ENCOUNTER — TELEPHONE (OUTPATIENT)
Dept: OBSTETRICS AND GYNECOLOGY | Age: 38
End: 2023-08-02

## 2023-08-09 ENCOUNTER — OFFICE VISIT (OUTPATIENT)
Dept: OBSTETRICS AND GYNECOLOGY | Age: 38
End: 2023-08-09

## 2023-08-09 VITALS
DIASTOLIC BLOOD PRESSURE: 64 MMHG | WEIGHT: 176 LBS | BODY MASS INDEX: 29.32 KG/M2 | SYSTOLIC BLOOD PRESSURE: 110 MMHG | HEIGHT: 65 IN

## 2023-08-09 DIAGNOSIS — Z12.31 BREAST CANCER SCREENING BY MAMMOGRAM: ICD-10-CM

## 2023-08-09 DIAGNOSIS — Z01.419 ENCOUNTER FOR GYNECOLOGICAL EXAMINATION: Primary | ICD-10-CM

## 2023-08-09 DIAGNOSIS — N81.6 RECTOCELE: ICD-10-CM

## 2023-08-09 DIAGNOSIS — Z12.4 SCREENING FOR CERVICAL CANCER: ICD-10-CM

## 2023-08-09 DIAGNOSIS — R30.0 DYSURIA: ICD-10-CM

## 2023-08-09 RX ORDER — NITROFURANTOIN 25; 75 MG/1; MG/1
100 CAPSULE ORAL 2 TIMES DAILY
Qty: 14 CAPSULE | Refills: 0 | Status: SHIPPED | OUTPATIENT
Start: 2023-08-09 | End: 2023-08-16

## 2023-08-09 NOTE — PROGRESS NOTES
Subjective     Chief Complaint   Patient presents with    Gynecologic Exam     Annual:Last pap ,screening mammograms not covered,discuss discomfort during intercourse and after voiding         History of Present Illness    Wellness exam  Stephanie Cobb is a very pleasant  38 y.o. female who presents for annual exam.  Mammo Exam patient wants to wait until next year and that has been placed, Contraception tubal ligation, Exercise yes    Patient's had a little bit of dysuria intermittently.  No fever no chills no flank pain    Also had intermittent discomfort with relations.    Cycles are changing a little bit..      Obstetric History:  OB History          4    Para   4    Term   4            AB        Living   4         SAB        IAB        Ectopic        Molar        Multiple   0    Live Births   4               Menstrual History:     Patient's last menstrual period was 2023 (approximate).       Sexual History:       Past Medical History:   Diagnosis Date    Abnormal ultrasound cardiogram     Contact dermatitis     FOLLOWED BY DERMATOLOGY - CHRONIC    Full thickness rotator cuff tear 07/15/2022    Gastroesophageal reflux disease without esophagitis 2020    History of COVID-19 2021    Hx of blood clots     S/P OVARIAN CYSTECTOMY  - RIGHT LEG SUPERFICIAL    Migraine     Tear of right glenoid labrum      Past Surgical History:   Procedure Laterality Date    DIAGNOSTIC LAPAROSCOPY Left 2018    Procedure: DIAGNOSTIC LAPAROSCOPY/LT.OVARIAN CYSTECTOMY;  Surgeon: Juan Ramon Dias MD;  Location: [facility] OR;  Service: Obstetrics/Gynecology    SHOULDER ARTHROSCOPY Right 2022    Procedure: RIGHT SHOULDER ARTHROSCOPY WITH CAPSULAR AND LABRAL REPAIR;  Surgeon: Kristian Lang MD;  Location: [facility];  Service: Orthopedics;  Laterality: Right;    TUBAL COAGULATION LAPAROSCOPIC Bilateral 2019    Procedure: POSTPARTUM TUBAL LIGATION;  Surgeon: Juan Ramon Dias MD;   "Location: [facility] LABOR DELIVERY;  Service: Obstetrics/Gynecology    WISDOM TOOTH EXTRACTION         SOCIAL Hx:      The following portions of the patient's history were reviewed and updated as appropriate: allergies, current medications, past family history, past medical history, past social history, past surgical history and problem list.    Review of Systems        Except as outlined in history of physical illness, patient denies any changes in her GYN, , GI systems.  All other systems reviewed were negative.         Current Outpatient Medications:     Ascorbic Acid (VITAMIN C PO), Take 1 tablet by mouth Daily., Disp: , Rfl:     Erenumab-aooe (AIMOVIG) 140 MG/ML auto-injector, Inject 1 mL under the skin into the appropriate area as directed Every 30 (Thirty) Days., Disp: 1 mL, Rfl: 11    promethazine (PHENERGAN) 12.5 MG tablet, Take 1 tablet by mouth Every 6 (Six) Hours As Needed for Nausea or Vomiting., Disp: 20 tablet, Rfl: 0    rizatriptan (MAXALT) 10 MG tablet, Take one tablet at the onset of migraine. May repeat in 2 hours if needed. Not to exceed 2 tablets in 24 hours., Disp: 10 tablet, Rfl: 11    ubrogepant (Ubrelvy) 50 MG tablet, Take 1 tablet by mouth., Disp: , Rfl:     nitrofurantoin, macrocrystal-monohydrate, (Macrobid) 100 MG capsule, Take 1 capsule by mouth 2 (Two) Times a Day for 7 days., Disp: 14 capsule, Rfl: 0   Objective   Physical Exam    /64   Ht 165.1 cm (65\")   Wt 79.8 kg (176 lb)   LMP 07/20/2023 (Approximate)   BMI 29.29 kg/mý     General: Patient is alert and oriented and appears overall healthy  Neck: Is supple without thyromegaly, no carotid bruits and no lymphadenopathy  Lungs: Clear bilaterally, no wheezing, rhonchi, or rales.  Respiratory rate is normal  Breast: Even, symmetrical, no lymphadenopathy, no retraction, no masses or cysts  Heart: Regular rate and rhythm are appreciated, no murmurs or rubs are heard  Abdomen: Is soft, without organomegaly, bowel sounds are " positive, there is no rebound or guarding and palpation does not produce any discomfort  Back: Nontender without CVA tenderness  Pelvic: External genitalia appear normal and consistent with mature female.  BUS normal                            Vagina is clean dry without discharge and appears adequately estrogenized, there is a second-degree rectocele present, this was confirmed with the speculum disassembled.  A mirror was given to the patient so she could see or visualize what I was describing.                         Cervix is noninflamed without discharge or lesions.  There is no cervical motion tenderness.                Uterus is nonenlarged, without tenderness, and no masses or abnormalities are  present               Adnexa are non-enlarged, non tender               Rectal exam reveals adequate sphincter tone and no masses or lesions are appreciated on digital rectal examination.      Annual Well Woman Exam  Patient Active Problem List   Diagnosis    Migraine with aura and without status migrainosus, not intractable    Stress    Anterior shoulder dislocation    Glenoid labrum tear    Osteoarthritis of acromioclavicular joint    Premature ventricular contractions (PVCs) (VPCs)    Rectocele                 Assessment & Plan   Diagnoses and all orders for this visit:    1. Encounter for gynecological examination (Primary)  -     IGP, Apt HPV,rfx 16 / 18,45    2. Dysuria  Patient does not want to run a UA, will empirically treat with some Macrobid which has been effective in the past  3. Breast cancer screening by mammogram    4. Screening for cervical cancer    5. Rectocele  Second-degree we will continue to observe diagramming discussions regarding etiology and anatomy of rectocele were had.  Patient voices understanding  Other orders  -     nitrofurantoin, macrocrystal-monohydrate, (Macrobid) 100 MG capsule; Take 1 capsule by mouth 2 (Two) Times a Day for 7 days.  Dispense: 14 capsule; Refill:  0      Discussed today's findings and concerns with patient.  Continue to recommend regular exercise including cardiovascular and resistance training as well as  breast self-exam. Wellness lab, mammography, & pap smear, in accordance with age guidelines.    I have encouraged her to call for today's test results if she has not received them within 10 days.  Patient is advised to call with any change in her condition or with any other questions, otherwise return  for annual examination.

## 2023-08-15 ENCOUNTER — HOSPITAL ENCOUNTER (OUTPATIENT)
Facility: HOSPITAL | Age: 38
Discharge: HOME | End: 2023-08-15
Admitting: NURSE PRACTITIONER
Payer: MEDICAID

## 2023-08-15 ENCOUNTER — SPECIALTY PHARMACY (OUTPATIENT)
Dept: NEUROLOGY | Facility: CLINIC | Age: 38
End: 2023-08-15

## 2023-08-15 ENCOUNTER — TRANSCRIBE ORDERS (OUTPATIENT)
Dept: ADMINISTRATIVE | Facility: HOSPITAL | Age: 38
End: 2023-08-15

## 2023-08-15 DIAGNOSIS — G43.109 MIGRAINE WITH AURA AND WITHOUT STATUS MIGRAINOSUS, NOT INTRACTABLE: ICD-10-CM

## 2023-08-15 DIAGNOSIS — H92.01 POSTERIOR AURICULAR PAIN, RIGHT: ICD-10-CM

## 2023-08-15 DIAGNOSIS — H92.01 POSTERIOR AURICULAR PAIN, RIGHT: Primary | ICD-10-CM

## 2023-08-15 PROCEDURE — 70450 CT HEAD/BRAIN W/O DYE: CPT

## 2023-08-16 ENCOUNTER — HOSPITAL ENCOUNTER (OUTPATIENT)
Facility: HOSPITAL | Age: 38
Discharge: HOME | End: 2023-08-16

## 2023-08-16 DIAGNOSIS — H92.01 POSTERIOR AURICULAR PAIN, RIGHT: ICD-10-CM

## 2023-08-16 DIAGNOSIS — G43.109 MIGRAINE WITH AURA AND WITHOUT STATUS MIGRAINOSUS, NOT INTRACTABLE: ICD-10-CM

## 2023-08-17 DIAGNOSIS — H92.01 POSTERIOR AURICULAR PAIN OF RIGHT EAR: Primary | ICD-10-CM

## 2023-08-17 RX ORDER — SUMATRIPTAN 50 MG/1
50 TABLET, FILM COATED ORAL ONCE AS NEEDED
Qty: 9 TABLET | Refills: 3 | Status: SHIPPED | OUTPATIENT
Start: 2023-08-17

## 2023-08-18 NOTE — PROGRESS NOTES
Specialty Clinical Pharmacist Note    Ubrogepant (Ubrelvy) initially ordered for patient for as needed treatment of migraines. With current insurance situation, not financially viable at this time. Per discussion with patient's Neurology provider, plan to try sumatriptan (Imitrex) as an alternative to rizatriptan, which is not always 100% effective in treating migraine symptoms.     Called and discussed this with patient who is agreeable to plan. Answered questions and she knows to call us with any questions or concerns in the future.    Jose Angel Sanchez, PharmD, BCPS  10:28 EDT  08/18/23

## 2023-08-27 ENCOUNTER — APPOINTMENT (OUTPATIENT)
Facility: HOSPITAL | Age: 38
End: 2023-08-27

## 2023-08-27 PROCEDURE — 73630 X-RAY EXAM OF FOOT: CPT

## 2023-10-11 ENCOUNTER — SPECIALTY PHARMACY (OUTPATIENT)
Dept: NEUROLOGY | Facility: CLINIC | Age: 38
End: 2023-10-11

## 2023-11-09 NOTE — H&P
Post-Procedure Pain Evaluation Only:    Telephone call to patient to evaluate pain response to Medial Branch/Dorsal Ramus Block, Bilateral, Lumbar L3, L4, and L5, First Diagnostic Block performed by Bridgette Horta MD on 2023.     Pre-procedure pain score: 5    Nerve block pain diary:   Immediate post-procedure pain: 1   Hour 1: 5   Hour 2: 5   Hour 3: 4   Hour 4: 4   Hour 5: 3   Next day: 5      % Pain Relief - 80% relief    Improvement in functionality with activities of daily livin%  \" I felt that the walking was better. Getting into and out of the vehicle was also easier.\"     Next office visit: 2023 Medial Branch Block/Dorsal Ramus, Bilateral, Lumbar, L3, L4, L5 Second Diagnostic Block       [facility]  Stephanie Cobb  : 1985  MRN: [id number]  CSN: [id number]    History and Physical  No chief complaint on file.    Subjective   Stephanie Cobb is a 32 y.o. year old  who present for surgery due to persistent pelvic pain over the last 3 days and presence of a enlarged left adnexa. Patient has had several negative pregnancy tests including one today. She is a couple weeks late for her period and was seen in the office today where pelvic exam revealed tenderness in enlarged left adnexa. Subsequent ultrasound revealed what appeared to be a tender 11 cm ovarian cyst on that side. After reviewing different treatment options patient would like to proceed with surgery. Risk of bleeding infection injury to other organs possible need for follow-up corrective surgeries were reviewed. Low likelihood of cancer has been discussed. Possibility that a hysterectomy might have to be performed on that side is also reviewed..    Past Medical History:   Diagnosis Date   • Urinary tract infection      Past Surgical History:   Procedure Laterality Date   • DENTAL PROCEDURE Left    • WISDOM TOOTH EXTRACTION       Smoking status: Never Smoker                                                              Smokeless tobacco: Never Used                        No current facility-administered medications for this encounter.     Current Outpatient Prescriptions:   •  ibuprofen (ADVIL,MOTRIN) 600 MG tablet, Take 1 tablet by mouth every 8 (eight) hours as needed for mild pain (1-3)., Disp: 40 tablet, Rfl: 3  •  Prenatal Vit-Fe Fumarate-FA (PRENATAL, CLASSIC, VITAMIN) 28-0.8 MG tablet tablet, Take  by mouth daily., Disp: , Rfl:     Allergies   Allergen Reactions   • Latex Rash     Tested positive on allergy test  Reaction while doing routine allergy testing       Review of Systems      Except as outlined in history of physical illness, patient denies any changes in her GYN, , GI systems. All other systems reviewed are  negative.        Objective   There were no vitals taken for this visit.  General: well developed; well nourished  no acute distress  appears stated age   Heart: regular rate and rhythm, S1, S2 normal, no murmur, click, rub or gallop   Lungs: breathing is unlabored  clear to auscultation bilaterally   Abdomen: soft, non-tender; no masses  no umbilical or inginual hernias are present  no hepato-splenomegaly   Pelvis:: Clinical staff was present for exam  Extra genitalia normal vagina clean dry and intact, well estrogenized, normal rugae. Cervix uterus and right adnexa normal nonenlarged nontender. Left adnexa is enlarged and very tender to touch. Clinically it felt 8-10 weeks size. Rectal exam confirms   Labs  Lab Results   Component Value Date     09/24/2016    HGB 10.4 (L) 09/24/2016    HCT 30.9 (L) 09/24/2016    WBC 6.90 09/24/2016        Assessment   1. Left adnexal mass most likely a cyst of left ovary.     Plan   1. After reviewing options and with negative pregnancy test and ultrasound confirmation patient wishes to proceed with laparoscopy and probable cystectomy possible oophorectomy. Risk of bleeding infection injury to other organs, anesthetic complications and potential need for corrective surgeries down the road have been reviewed    Juan Ramon Dias MD  5/21/2018  1:07 PM       EMR Dragon/ Transcription disclaimer:  Much of the encounter note is an electronic transcription/translation of spoken language to printed text. The electronic translation of spoken language may permit erroneous, or at times, nonessential words or phrases to be inadvertently transcribes; Although i have reviewed the note for such errors, some may still exist.

## 2023-11-20 ENCOUNTER — TELEPHONE (OUTPATIENT)
Dept: OBSTETRICS AND GYNECOLOGY | Age: 38
End: 2023-11-20

## 2023-11-21 ENCOUNTER — PREP FOR SURGERY (OUTPATIENT)
Dept: OTHER | Facility: HOSPITAL | Age: 38
End: 2023-11-21
Payer: MEDICAID

## 2023-11-21 ENCOUNTER — OFFICE VISIT (OUTPATIENT)
Dept: OBSTETRICS AND GYNECOLOGY | Age: 38
End: 2023-11-21
Payer: MEDICAID

## 2023-11-21 VITALS
BODY MASS INDEX: 27.32 KG/M2 | HEIGHT: 66 IN | WEIGHT: 170 LBS | DIASTOLIC BLOOD PRESSURE: 72 MMHG | SYSTOLIC BLOOD PRESSURE: 114 MMHG

## 2023-11-21 DIAGNOSIS — N94.10 FEMALE DYSPAREUNIA: ICD-10-CM

## 2023-11-21 DIAGNOSIS — N81.6 RECTOCELE: ICD-10-CM

## 2023-11-21 DIAGNOSIS — N81.6 RECTOCELE: Primary | ICD-10-CM

## 2023-11-21 DIAGNOSIS — N92.0 MENORRHAGIA WITH REGULAR CYCLE: ICD-10-CM

## 2023-11-21 DIAGNOSIS — N81.4 UTERINE PROLAPSE: Primary | ICD-10-CM

## 2023-11-21 PROBLEM — N81.2 CYSTOCELE AND RECTOCELE WITH INCOMPLETE UTEROVAGINAL PROLAPSE: Status: ACTIVE | Noted: 2023-11-21

## 2023-11-21 RX ORDER — SODIUM CHLORIDE 0.9 % (FLUSH) 0.9 %
1-10 SYRINGE (ML) INJECTION AS NEEDED
OUTPATIENT
Start: 2023-11-21

## 2023-11-21 RX ORDER — SODIUM CHLORIDE 0.9 % (FLUSH) 0.9 %
10 SYRINGE (ML) INJECTION EVERY 12 HOURS SCHEDULED
OUTPATIENT
Start: 2023-11-21

## 2023-11-21 RX ORDER — PHENAZOPYRIDINE HYDROCHLORIDE 200 MG/1
200 TABLET, FILM COATED ORAL ONCE
OUTPATIENT
Start: 2023-11-21 | End: 2023-11-21

## 2023-11-21 RX ORDER — SODIUM CHLORIDE 9 MG/ML
40 INJECTION, SOLUTION INTRAVENOUS AS NEEDED
OUTPATIENT
Start: 2023-11-21

## 2023-11-21 NOTE — PROGRESS NOTES
Subjective       History of Present Illness  Stephanie Cobb is a 38 y.o. female is being seen today for further evaluation of her rectocele  Chief Complaint   Patient presents with    Gynecologic Exam     Gyn problem: difficulty with bowel movements,painful intercourse,? Worsening prolapse   .        The following portions of the patient's history were reviewed and updated as appropriate: allergies, current medications, past family history, past medical history, past social history, past surgical history and problem list.    PAST MEDICAL HISTORY  Past Medical History:   Diagnosis Date    Abnormal ultrasound cardiogram     Contact dermatitis     FOLLOWED BY DERMATOLOGY - CHRONIC    Full thickness rotator cuff tear 07/15/2022    Gastroesophageal reflux disease without esophagitis 2020    History of COVID-19 2021    Hx of blood clots     S/P OVARIAN CYSTECTOMY  - RIGHT LEG SUPERFICIAL    Migraine     Tear of right glenoid labrum      OB History    Para Term  AB Living   4 4 4     4   SAB IAB Ectopic Molar Multiple Live Births           0 4      # Outcome Date GA Lbr Haseeb/2nd Weight Sex Delivery Anes PTL Lv   4 Term 19 39w2d 01:47 / 00:29 3941 g (8 lb 11 oz) M Vag-Spont EPI N BOOGIE      Birth Comments: scale1   3 Term 16 39w3d 04:05 / 01:49 3643 g (8 lb 0.5 oz) F Vag-Spont EPI N BOOGIE   2 Term 09/10/14 38w3d   M Vag-Spont EPI N BOOGIE   1 Term 10/11/12 40w0d   M Vag-Spont EPI N BOOGIE     Past Surgical History:   Procedure Laterality Date    DIAGNOSTIC LAPAROSCOPY Left 2018    Procedure: DIAGNOSTIC LAPAROSCOPY/LT.OVARIAN CYSTECTOMY;  Surgeon: Juan Ramon Dias MD;  Location: [facility] OR;  Service: Obstetrics/Gynecology    SHOULDER ARTHROSCOPY Right 2022    Procedure: RIGHT SHOULDER ARTHROSCOPY WITH CAPSULAR AND LABRAL REPAIR;  Surgeon: Kristian Lang MD;  Location: [facility] OR OSC;  Service: Orthopedics;  Laterality: Right;    TUBAL COAGULATION LAPAROSCOPIC Bilateral 2019     Procedure: POSTPARTUM TUBAL LIGATION;  Surgeon: Juan Ramon Dias MD;  Location: [facility] LABOR DELIVERY;  Service: Obstetrics/Gynecology    WISDOM TOOTH EXTRACTION       Family History   Problem Relation Age of Onset    Hypertension Father     Heart disease Paternal Uncle     Stroke Maternal Grandmother     Atrial fibrillation Maternal Grandmother     Thyroid nodules Maternal Grandmother     Aneurysm Maternal Grandfather     Hypertension Paternal Grandmother     Malig Hyperthermia Neg Hx     Breast cancer Neg Hx     Colon cancer Neg Hx      Social History     Tobacco Use   Smoking Status Never   Smokeless Tobacco Never       Current Outpatient Medications:     Ascorbic Acid (VITAMIN C PO), Take 1 tablet by mouth Daily., Disp: , Rfl:     Erenumab-aooe (AIMOVIG) 140 MG/ML auto-injector, Inject 1 mL under the skin into the appropriate area as directed Every 30 (Thirty) Days., Disp: 1 mL, Rfl: 11    promethazine (PHENERGAN) 12.5 MG tablet, Take 1 tablet by mouth Every 6 (Six) Hours As Needed for Nausea or Vomiting., Disp: 20 tablet, Rfl: 0    SUMAtriptan (IMITREX) 50 MG tablet, Take 1 tablet by mouth 1 (One) Time As Needed for Migraine. May repeat 1 tablet in 2 hours if headache not relieved. Max of 2 doses in 24 hours., Disp: 9 tablet, Rfl: 3    meloxicam (Mobic) 7.5 MG tablet, Take 1 tablet by mouth Daily. (Patient not taking: Reported on 11/21/2023), Disp: 10 tablet, Rfl: 0  Immunization History   Administered Date(s) Administered    COVID-19 (PFIZER) Purple Cap Monovalent 08/17/2021, 09/07/2021    Tdap 03/01/2019       Review of Systems       Except as outlined in history of physical illness, patient denies any changes in her GYN, , GI systems. All other systems reviewed are negative.    Objective   Physical Exam   Alert and oriented, respirations unlabored, heart regular rate and rhythm   Pelvic external genitalia normal a bulge posteriorly can be seen at rest and when patient bears down the rectocele protrudes  slightly past the introitus  Speculum exam with the speculum disarticulated confirm second/third-degree rectocele, very little bladder relaxation and only a first-degree mild uterine prolapse mucosa is otherwise healthy.  Bimanual exam shows cervix uterus normal not enlarged nontender adnexa nonenlarged non tender  Rectal exam confirms third-degree rectocele      Assessment & Plan   Diagnoses and all orders for this visit:    1. Rectocele (Primary)    2. Female dyspareunia    3. Menorrhagia with regular cycle    History and physical exam as outlined above  This was initially a second-degree not third-degree with Valsalva  She occasionally now has to insert a finger to initiate a bowel movement  There is intermittent association with dyspareunia and pelvic pressure  Patient also has first-degree uterine prolapse with worsening menorrhagia that is been unresponsive to other therapies  We had a lengthy discussion regarding recent worsening of her rectocele and recurrent menorrhagia with uterine prolapse.  Patient definitely wants to proceed with rectocele repair and strongly considering a TLH for worsening menorrhagia and first-degree uterine prolapse.  Will let us know which way she wants to proceed.             No orders of the defined types were placed in this encounter.          EMR Dragon/ Transcription disclaimer:  Much of the encounter note is an electronic transcription/translation of spoken language to printed text. The electronic translation of spoken language may permit erroneous, or at times, nonessential words or phrases to be inadvertently transcribes; Although i have reviewed the note for such errors, some may still exist.

## 2023-11-24 ENCOUNTER — PRE-ADMISSION TESTING (OUTPATIENT)
Dept: PREADMISSION TESTING | Facility: HOSPITAL | Age: 38
End: 2023-11-24
Payer: MEDICAID

## 2023-11-24 VITALS
RESPIRATION RATE: 16 BRPM | SYSTOLIC BLOOD PRESSURE: 114 MMHG | TEMPERATURE: 97.5 F | HEIGHT: 67 IN | OXYGEN SATURATION: 100 % | DIASTOLIC BLOOD PRESSURE: 80 MMHG | HEART RATE: 69 BPM | WEIGHT: 175.3 LBS | BODY MASS INDEX: 27.51 KG/M2

## 2023-11-24 DIAGNOSIS — N81.4 UTERINE PROLAPSE: ICD-10-CM

## 2023-11-24 DIAGNOSIS — N81.6 RECTOCELE: ICD-10-CM

## 2023-11-24 LAB
BASOPHILS # BLD AUTO: 0.03 10*3/MM3 (ref 0–0.2)
BASOPHILS NFR BLD AUTO: 0.8 % (ref 0–1.5)
DEPRECATED RDW RBC AUTO: 36.5 FL (ref 37–54)
EOSINOPHIL # BLD AUTO: 0.07 10*3/MM3 (ref 0–0.4)
EOSINOPHIL NFR BLD AUTO: 1.8 % (ref 0.3–6.2)
ERYTHROCYTE [DISTWIDTH] IN BLOOD BY AUTOMATED COUNT: 11.7 % (ref 12.3–15.4)
HCG SERPL QL: NEGATIVE
HCT VFR BLD AUTO: 37 % (ref 34–46.6)
HGB BLD-MCNC: 12.3 G/DL (ref 12–15.9)
IMM GRANULOCYTES # BLD AUTO: 0.01 10*3/MM3 (ref 0–0.05)
IMM GRANULOCYTES NFR BLD AUTO: 0.3 % (ref 0–0.5)
LYMPHOCYTES # BLD AUTO: 1.8 10*3/MM3 (ref 0.7–3.1)
LYMPHOCYTES NFR BLD AUTO: 45.6 % (ref 19.6–45.3)
MCH RBC QN AUTO: 29.4 PG (ref 26.6–33)
MCHC RBC AUTO-ENTMCNC: 33.2 G/DL (ref 31.5–35.7)
MCV RBC AUTO: 88.3 FL (ref 79–97)
MONOCYTES # BLD AUTO: 0.32 10*3/MM3 (ref 0.1–0.9)
MONOCYTES NFR BLD AUTO: 8.1 % (ref 5–12)
NEUTROPHILS NFR BLD AUTO: 1.72 10*3/MM3 (ref 1.7–7)
NEUTROPHILS NFR BLD AUTO: 43.4 % (ref 42.7–76)
NRBC BLD AUTO-RTO: 0 /100 WBC (ref 0–0.2)
PLATELET # BLD AUTO: 306 10*3/MM3 (ref 140–450)
PMV BLD AUTO: 8.9 FL (ref 6–12)
QT INTERVAL: 379 MS
QTC INTERVAL: 400 MS
RBC # BLD AUTO: 4.19 10*6/MM3 (ref 3.77–5.28)
WBC NRBC COR # BLD AUTO: 3.95 10*3/MM3 (ref 3.4–10.8)

## 2023-11-24 PROCEDURE — 93005 ELECTROCARDIOGRAM TRACING: CPT

## 2023-11-24 PROCEDURE — 36415 COLL VENOUS BLD VENIPUNCTURE: CPT

## 2023-11-24 PROCEDURE — 85025 COMPLETE CBC W/AUTO DIFF WBC: CPT

## 2023-11-24 PROCEDURE — 93010 ELECTROCARDIOGRAM REPORT: CPT | Performed by: INTERNAL MEDICINE

## 2023-11-24 PROCEDURE — 84703 CHORIONIC GONADOTROPIN ASSAY: CPT

## 2023-11-24 RX ORDER — ONDANSETRON 4 MG/1
4 TABLET, FILM COATED ORAL EVERY 8 HOURS PRN
COMMUNITY

## 2023-11-24 NOTE — DISCHARGE INSTRUCTIONS
Arrive to hospital on your day of surgery at 100 PM    Take the following medications the morning of surgery:  NONE      If you are on prescription narcotic pain medication to control your pain you may also take that medication the morning of surgery.    General Instructions:  Do not eat solid food after midnight the night before surgery.  You may drink clear liquids day of surgery but must stop at least one hour before your hospital arrival time.  It is beneficial for you to have a clear drink that contains carbohydrates the day of surgery.  We suggest a 12 to 20 ounce bottle of Gatorade or Powerade for non-diabetic patients or a 12 to 20 ounce bottle of G2 or Powerade Zero for diabetic patients. (Pediatric patients, are not advised to drink a 12 to 20 ounce carbohydrate drink)    Clear liquids are liquids you can see through.  Nothing red in color.     Plain water                               Sports drinks  Sodas                                   Gelatin (Jell-O)  Fruit juices without pulp such as white grape juice and apple juice  Popsicles that contain no fruit or yogurt  Tea or coffee (no cream or milk added)  Gatorade / Powerade  G2 / Powerade Zero    Infants may have breast milk up to four hours before surgery.  Infants drinking formula may drink formula up to six hours before surgery.   Patients who avoid smoking, chewing tobacco and alcohol for 4 weeks prior to surgery have a reduced risk of post-operative complications.  Quit smoking as many days before surgery as you can.  Do not smoke, use chewing tobacco or drink alcohol the day of surgery.   If applicable bring your C-PAP/ BI-PAP machine in with you to preop day of surgery.  Bring any papers given to you in the doctor’s office.  Wear clean comfortable clothes.  Do not wear contact lenses, false eyelashes or make-up.  Bring a case for your glasses.   Bring crutches or walker if applicable.  Remove all piercings.  Leave jewelry and any other valuables  at home.  Hair extensions with metal clips must be removed prior to surgery.  The Pre-Admission Testing nurse will instruct you to bring medications if unable to obtain an accurate list in Pre-Admission Testing.        If you were given a blood bank ID arm band remember to bring it with you the day of surgery.    Preventing a Surgical Site Infection:  For 2 to 3 days before surgery, avoid shaving with a razor because the razor can irritate skin and make it easier to develop an infection.    Any areas of open skin can increase the risk of a post-operative wound infection by allowing bacteria to enter and travel throughout the body.  Notify your surgeon if you have any skin wounds / rashes even if it is not near the expected surgical site.  The area will need assessed to determine if surgery should be delayed until it is healed.  The night prior to surgery shower using a fresh bar of anti-bacterial soap (such as Dial) and clean washcloth.  Sleep in a clean bed with clean clothing.  Do not allow pets to sleep with you.  Shower on the morning of surgery using a fresh bar of anti-bacterial soap (such as Dial) and clean washcloth.  Dry with a clean towel and dress in clean clothing.  Ask your surgeon if you will be receiving antibiotics prior to surgery.  Make sure you, your family, and all healthcare providers clean their hands with soap and water or an alcohol based hand  before caring for you or your wound.    Day of surgery:  Your arrival time is approximately two hours before your scheduled surgery time.  Upon arrival, a Pre-op nurse and Anesthesiologist will review your health history, obtain vital signs, and answer questions you may have.  The only belongings needed at this time will be a list of your home medications and if applicable your C-PAP/BI-PAP machine.  A Pre-op nurse will start an IV and you may receive medication in preparation for surgery, including something to help you relax.     Please be  aware that surgery does come with discomfort.  We want to make every effort to control your discomfort so please discuss any uncontrolled symptoms with your nurse.   Your doctor will most likely have prescribed pain medications.      If you are going home after surgery you will receive individualized written care instructions before being discharged.  A responsible adult must drive you to and from the hospital on the day of your surgery and stay with you for 24 hours.  Discharge prescriptions can be filled by the hospital pharmacy during regular pharmacy hours.  If you are having surgery late in the day/evening your prescription may be e-prescribed to your pharmacy.  Please verify your pharmacy hours or chose a 24 hour pharmacy to avoid not having access to your prescription because your pharmacy has closed for the day.    If you are staying overnight following surgery, you will be transported to your hospital room following the recovery period.  [facility] has all private rooms.    If you have any questions please call Pre-Admission Testing at [phone].  Deductibles and co-payments are collected on the day of service. Please be prepared to pay the required co-pay, deductible or deposit on the day of service as defined by your plan.    Call your surgeon immediately if you experience any of the following symptoms:  Sore Throat  Shortness of Breath or difficulty breathing  Cough  Chills  Body soreness or muscle pain  Headache  Fever  New loss of taste or smell  Do not arrive for your surgery ill.  Your procedure will need to be rescheduled to another time.  You will need to call your physician before the day of surgery to avoid any unnecessary exposure to hospital staff as well as other patients.          CHLORHEXIDINE CLOTH INSTRUCTIONS  The morning of surgery follow these instructions using the Chlorhexidine cloths you've been given.  These steps reduce bacteria on the body.  Do not use the  cloths near your eyes, ears mouth, genitalia or on open wounds.  Throw the cloths away after use but do not try to flush them down a toilet.      Open and remove one cloth at a time from the package.    Leave the cloth unfolded and begin the bathing.  Massage the skin with the cloths using gentle pressure to remove bacteria.  Do not scrub harshly.   Follow the steps below with one 2% CHG cloth per area (6 total cloths).  One cloth for neck, shoulders and chest.  One cloth for both arms, hands, fingers and underarms (do underarms last).  One cloth for the abdomen followed by groin.  One cloth for right leg and foot including between the toes.  One cloth for left leg and foot including between the toes.  The last cloth is to be used for the back of the neck, back and buttocks.    Allow the CHG to air dry 3 minutes on the skin which will give it time to work and decrease the chance of irritation.  The skin may feel sticky until it is dry.  Do not rinse with water or any other liquid or you will lose the beneficial effects of the CHG.  If mild skin irritation occurs, do rinse the skin to remove the CHG.  Report this to the nurse at time of admission.  Do not apply lotions, creams, ointments, deodorants or perfumes after using the clothes. Dress in clean clothes before coming to the hospital.

## 2023-11-29 ENCOUNTER — HOSPITAL ENCOUNTER (OUTPATIENT)
Facility: HOSPITAL | Age: 38
Setting detail: OBSERVATION
Discharge: HOME | End: 2023-11-30
Attending: OBSTETRICS & GYNECOLOGY | Admitting: OBSTETRICS & GYNECOLOGY

## 2023-11-29 ENCOUNTER — ANESTHESIA (OUTPATIENT)
Dept: PERIOP | Facility: HOSPITAL | Age: 38
End: 2023-11-29

## 2023-11-29 ENCOUNTER — ANESTHESIA EVENT (OUTPATIENT)
Dept: PERIOP | Facility: HOSPITAL | Age: 38
End: 2023-11-29

## 2023-11-29 DIAGNOSIS — N81.4 UTERINE PROLAPSE: ICD-10-CM

## 2023-11-29 DIAGNOSIS — N94.10 FEMALE DYSPAREUNIA: ICD-10-CM

## 2023-11-29 DIAGNOSIS — N92.0 MENORRHAGIA WITH REGULAR CYCLE: ICD-10-CM

## 2023-11-29 DIAGNOSIS — N81.2 CYSTOCELE AND RECTOCELE WITH INCOMPLETE UTEROVAGINAL PROLAPSE: Primary | ICD-10-CM

## 2023-11-29 DIAGNOSIS — N81.6 RECTOCELE: ICD-10-CM

## 2023-11-29 PROCEDURE — 57250 REPAIR RECTUM & VAGINA: CPT | Performed by: STUDENT IN AN ORGANIZED HEALTH CARE EDUCATION/TRAINING PROGRAM

## 2023-11-29 PROCEDURE — 58571 TLH W/T/O 250 G OR LESS: CPT | Performed by: STUDENT IN AN ORGANIZED HEALTH CARE EDUCATION/TRAINING PROGRAM

## 2023-11-29 PROCEDURE — 25010000002 PROPOFOL 10 MG/ML EMULSION: Performed by: ANESTHESIOLOGY

## 2023-11-29 PROCEDURE — 25010000002 DEXAMETHASONE SODIUM PHOSPHATE 20 MG/5ML SOLUTION: Performed by: ANESTHESIOLOGY

## 2023-11-29 PROCEDURE — G0378 HOSPITAL OBSERVATION PER HR: HCPCS

## 2023-11-29 PROCEDURE — 25010000002 KETOROLAC TROMETHAMINE PER 15 MG: Performed by: ANESTHESIOLOGY

## 2023-11-29 PROCEDURE — S0260 H&P FOR SURGERY: HCPCS | Performed by: OBSTETRICS & GYNECOLOGY

## 2023-11-29 PROCEDURE — 25010000002 SUGAMMADEX 200 MG/2ML SOLUTION: Performed by: ANESTHESIOLOGY

## 2023-11-29 PROCEDURE — 25010000002 ONDANSETRON PER 1 MG: Performed by: ANESTHESIOLOGY

## 2023-11-29 PROCEDURE — 25010000002 HYDROMORPHONE 1 MG/ML SOLUTION: Performed by: ANESTHESIOLOGY

## 2023-11-29 PROCEDURE — 58571 TLH W/T/O 250 G OR LESS: CPT | Performed by: OBSTETRICS & GYNECOLOGY

## 2023-11-29 PROCEDURE — 57250 REPAIR RECTUM & VAGINA: CPT | Performed by: OBSTETRICS & GYNECOLOGY

## 2023-11-29 PROCEDURE — 25810000003 LACTATED RINGERS PER 1000 ML: Performed by: ANESTHESIOLOGY

## 2023-11-29 PROCEDURE — 25010000002 HYDROMORPHONE PER 4 MG: Performed by: ANESTHESIOLOGY

## 2023-11-29 PROCEDURE — 25010000002 FENTANYL CITRATE (PF) 50 MCG/ML SOLUTION: Performed by: ANESTHESIOLOGY

## 2023-11-29 PROCEDURE — 25010000002 CEFOXITIN PER 1 G: Performed by: OBSTETRICS & GYNECOLOGY

## 2023-11-29 PROCEDURE — 88305 TISSUE EXAM BY PATHOLOGIST: CPT | Performed by: OBSTETRICS & GYNECOLOGY

## 2023-11-29 DEVICE — ARISTA AH ABSORBABLE HEMOSTATIC PARTICLES
Type: IMPLANTABLE DEVICE | Site: PELVIS | Status: FUNCTIONAL
Brand: ARISTA™ AH

## 2023-11-29 RX ORDER — SODIUM CHLORIDE 0.9 % (FLUSH) 0.9 %
10 SYRINGE (ML) INJECTION EVERY 12 HOURS SCHEDULED
Status: DISCONTINUED | OUTPATIENT
Start: 2023-11-29 | End: 2023-11-29 | Stop reason: HOSPADM

## 2023-11-29 RX ORDER — DIPHENHYDRAMINE HYDROCHLORIDE 50 MG/ML
25 INJECTION INTRAMUSCULAR; INTRAVENOUS NIGHTLY PRN
Status: DISCONTINUED | OUTPATIENT
Start: 2023-11-29 | End: 2023-11-30 | Stop reason: HOSPADM

## 2023-11-29 RX ORDER — SODIUM CHLORIDE, SODIUM LACTATE, POTASSIUM CHLORIDE, CALCIUM CHLORIDE 600; 310; 30; 20 MG/100ML; MG/100ML; MG/100ML; MG/100ML
9 INJECTION, SOLUTION INTRAVENOUS CONTINUOUS
Status: DISCONTINUED | OUTPATIENT
Start: 2023-11-29 | End: 2023-11-29

## 2023-11-29 RX ORDER — FLUMAZENIL 0.1 MG/ML
0.2 INJECTION INTRAVENOUS AS NEEDED
Status: DISCONTINUED | OUTPATIENT
Start: 2023-11-29 | End: 2023-11-29 | Stop reason: HOSPADM

## 2023-11-29 RX ORDER — SIMETHICONE 80 MG
80 TABLET,CHEWABLE ORAL 4 TIMES DAILY PRN
Status: DISCONTINUED | OUTPATIENT
Start: 2023-11-29 | End: 2023-11-30 | Stop reason: HOSPADM

## 2023-11-29 RX ORDER — SODIUM CHLORIDE 0.9 % (FLUSH) 0.9 %
1-10 SYRINGE (ML) INJECTION AS NEEDED
Status: DISCONTINUED | OUTPATIENT
Start: 2023-11-29 | End: 2023-11-29 | Stop reason: HOSPADM

## 2023-11-29 RX ORDER — PROMETHAZINE HYDROCHLORIDE 25 MG/1
25 TABLET ORAL ONCE AS NEEDED
Status: DISCONTINUED | OUTPATIENT
Start: 2023-11-29 | End: 2023-11-29 | Stop reason: HOSPADM

## 2023-11-29 RX ORDER — CEFAZOLIN SODIUM 2 G/100ML
2000 INJECTION, SOLUTION INTRAVENOUS EVERY 8 HOURS
Status: COMPLETED | OUTPATIENT
Start: 2023-11-29 | End: 2023-11-30

## 2023-11-29 RX ORDER — HYDROMORPHONE HYDROCHLORIDE 1 MG/ML
0.5 INJECTION, SOLUTION INTRAMUSCULAR; INTRAVENOUS; SUBCUTANEOUS
Status: DISCONTINUED | OUTPATIENT
Start: 2023-11-29 | End: 2023-11-30 | Stop reason: HOSPADM

## 2023-11-29 RX ORDER — ROCURONIUM BROMIDE 10 MG/ML
INJECTION, SOLUTION INTRAVENOUS AS NEEDED
Status: DISCONTINUED | OUTPATIENT
Start: 2023-11-29 | End: 2023-11-29 | Stop reason: SURG

## 2023-11-29 RX ORDER — ONDANSETRON 2 MG/ML
4 INJECTION INTRAMUSCULAR; INTRAVENOUS EVERY 6 HOURS PRN
Status: DISCONTINUED | OUTPATIENT
Start: 2023-11-29 | End: 2023-11-30 | Stop reason: HOSPADM

## 2023-11-29 RX ORDER — ONDANSETRON 2 MG/ML
INJECTION INTRAMUSCULAR; INTRAVENOUS AS NEEDED
Status: DISCONTINUED | OUTPATIENT
Start: 2023-11-29 | End: 2023-11-29 | Stop reason: SURG

## 2023-11-29 RX ORDER — DEXTROSE AND SODIUM CHLORIDE 5; .45 G/100ML; G/100ML
100 INJECTION, SOLUTION INTRAVENOUS CONTINUOUS
Status: DISCONTINUED | OUTPATIENT
Start: 2023-11-29 | End: 2023-11-30 | Stop reason: HOSPADM

## 2023-11-29 RX ORDER — MIDAZOLAM HYDROCHLORIDE 1 MG/ML
1 INJECTION INTRAMUSCULAR; INTRAVENOUS
Status: DISCONTINUED | OUTPATIENT
Start: 2023-11-29 | End: 2023-11-29 | Stop reason: HOSPADM

## 2023-11-29 RX ORDER — NALOXONE HCL 0.4 MG/ML
0.1 VIAL (ML) INJECTION
Status: DISCONTINUED | OUTPATIENT
Start: 2023-11-29 | End: 2023-11-30 | Stop reason: HOSPADM

## 2023-11-29 RX ORDER — DIPHENHYDRAMINE HYDROCHLORIDE 50 MG/ML
12.5 INJECTION INTRAMUSCULAR; INTRAVENOUS
Status: DISCONTINUED | OUTPATIENT
Start: 2023-11-29 | End: 2023-11-29 | Stop reason: HOSPADM

## 2023-11-29 RX ORDER — PROPOFOL 10 MG/ML
VIAL (ML) INTRAVENOUS AS NEEDED
Status: DISCONTINUED | OUTPATIENT
Start: 2023-11-29 | End: 2023-11-29 | Stop reason: SURG

## 2023-11-29 RX ORDER — OXYCODONE AND ACETAMINOPHEN 7.5; 325 MG/1; MG/1
1 TABLET ORAL EVERY 4 HOURS PRN
Status: DISCONTINUED | OUTPATIENT
Start: 2023-11-29 | End: 2023-11-29 | Stop reason: HOSPADM

## 2023-11-29 RX ORDER — SODIUM CHLORIDE 9 MG/ML
40 INJECTION, SOLUTION INTRAVENOUS AS NEEDED
Status: DISCONTINUED | OUTPATIENT
Start: 2023-11-29 | End: 2023-11-29 | Stop reason: HOSPADM

## 2023-11-29 RX ORDER — FAMOTIDINE 10 MG/ML
20 INJECTION, SOLUTION INTRAVENOUS ONCE
Status: COMPLETED | OUTPATIENT
Start: 2023-11-29 | End: 2023-11-29

## 2023-11-29 RX ORDER — NALOXONE HCL 0.4 MG/ML
0.2 VIAL (ML) INJECTION AS NEEDED
Status: DISCONTINUED | OUTPATIENT
Start: 2023-11-29 | End: 2023-11-29 | Stop reason: HOSPADM

## 2023-11-29 RX ORDER — LIDOCAINE HYDROCHLORIDE 20 MG/ML
INJECTION, SOLUTION INFILTRATION; PERINEURAL AS NEEDED
Status: DISCONTINUED | OUTPATIENT
Start: 2023-11-29 | End: 2023-11-29 | Stop reason: SURG

## 2023-11-29 RX ORDER — DIPHENHYDRAMINE HCL 25 MG
25 CAPSULE ORAL NIGHTLY PRN
Status: DISCONTINUED | OUTPATIENT
Start: 2023-11-29 | End: 2023-11-30 | Stop reason: HOSPADM

## 2023-11-29 RX ORDER — MORPHINE SULFATE 2 MG/ML
1 INJECTION, SOLUTION INTRAMUSCULAR; INTRAVENOUS EVERY 4 HOURS PRN
Status: DISCONTINUED | OUTPATIENT
Start: 2023-11-29 | End: 2023-11-30 | Stop reason: HOSPADM

## 2023-11-29 RX ORDER — LABETALOL HYDROCHLORIDE 5 MG/ML
5 INJECTION, SOLUTION INTRAVENOUS
Status: DISCONTINUED | OUTPATIENT
Start: 2023-11-29 | End: 2023-11-29 | Stop reason: HOSPADM

## 2023-11-29 RX ORDER — LIDOCAINE HYDROCHLORIDE AND EPINEPHRINE BITARTRATE 20; .01 MG/ML; MG/ML
INJECTION, SOLUTION SUBCUTANEOUS AS NEEDED
Status: DISCONTINUED | OUTPATIENT
Start: 2023-11-29 | End: 2023-11-29 | Stop reason: HOSPADM

## 2023-11-29 RX ORDER — ONDANSETRON 2 MG/ML
4 INJECTION INTRAMUSCULAR; INTRAVENOUS ONCE AS NEEDED
Status: DISCONTINUED | OUTPATIENT
Start: 2023-11-29 | End: 2023-11-29 | Stop reason: HOSPADM

## 2023-11-29 RX ORDER — KETOROLAC TROMETHAMINE 30 MG/ML
INJECTION, SOLUTION INTRAMUSCULAR; INTRAVENOUS AS NEEDED
Status: DISCONTINUED | OUTPATIENT
Start: 2023-11-29 | End: 2023-11-29 | Stop reason: SURG

## 2023-11-29 RX ORDER — DROPERIDOL 2.5 MG/ML
0.62 INJECTION, SOLUTION INTRAMUSCULAR; INTRAVENOUS
Status: DISCONTINUED | OUTPATIENT
Start: 2023-11-29 | End: 2023-11-29 | Stop reason: HOSPADM

## 2023-11-29 RX ORDER — PROMETHAZINE HYDROCHLORIDE 25 MG/1
25 SUPPOSITORY RECTAL ONCE AS NEEDED
Status: DISCONTINUED | OUTPATIENT
Start: 2023-11-29 | End: 2023-11-29 | Stop reason: HOSPADM

## 2023-11-29 RX ORDER — SODIUM CHLORIDE 0.9 % (FLUSH) 0.9 %
3-10 SYRINGE (ML) INJECTION AS NEEDED
Status: DISCONTINUED | OUTPATIENT
Start: 2023-11-29 | End: 2023-11-29 | Stop reason: HOSPADM

## 2023-11-29 RX ORDER — EPHEDRINE SULFATE 50 MG/ML
5 INJECTION, SOLUTION INTRAVENOUS ONCE AS NEEDED
Status: DISCONTINUED | OUTPATIENT
Start: 2023-11-29 | End: 2023-11-29 | Stop reason: HOSPADM

## 2023-11-29 RX ORDER — DEXAMETHASONE SODIUM PHOSPHATE 4 MG/ML
INJECTION, SOLUTION INTRA-ARTICULAR; INTRALESIONAL; INTRAMUSCULAR; INTRAVENOUS; SOFT TISSUE AS NEEDED
Status: DISCONTINUED | OUTPATIENT
Start: 2023-11-29 | End: 2023-11-29 | Stop reason: SURG

## 2023-11-29 RX ORDER — LIDOCAINE HYDROCHLORIDE 10 MG/ML
0.5 INJECTION, SOLUTION INFILTRATION; PERINEURAL ONCE AS NEEDED
Status: DISCONTINUED | OUTPATIENT
Start: 2023-11-29 | End: 2023-11-29 | Stop reason: HOSPADM

## 2023-11-29 RX ORDER — ACETAMINOPHEN AND CODEINE PHOSPHATE 300; 30 MG/1; MG/1
2 TABLET ORAL EVERY 4 HOURS PRN
Status: DISCONTINUED | OUTPATIENT
Start: 2023-11-29 | End: 2023-11-30 | Stop reason: HOSPADM

## 2023-11-29 RX ORDER — SODIUM CHLORIDE 0.9 % (FLUSH) 0.9 %
3 SYRINGE (ML) INJECTION EVERY 12 HOURS SCHEDULED
Status: DISCONTINUED | OUTPATIENT
Start: 2023-11-29 | End: 2023-11-29 | Stop reason: HOSPADM

## 2023-11-29 RX ORDER — HYDROCODONE BITARTRATE AND ACETAMINOPHEN 5; 325 MG/1; MG/1
1 TABLET ORAL ONCE AS NEEDED
Status: DISCONTINUED | OUTPATIENT
Start: 2023-11-29 | End: 2023-11-29 | Stop reason: HOSPADM

## 2023-11-29 RX ORDER — HYDRALAZINE HYDROCHLORIDE 20 MG/ML
5 INJECTION INTRAMUSCULAR; INTRAVENOUS
Status: DISCONTINUED | OUTPATIENT
Start: 2023-11-29 | End: 2023-11-29 | Stop reason: HOSPADM

## 2023-11-29 RX ORDER — BISACODYL 10 MG
10 SUPPOSITORY, RECTAL RECTAL DAILY PRN
Status: DISCONTINUED | OUTPATIENT
Start: 2023-11-29 | End: 2023-11-30 | Stop reason: HOSPADM

## 2023-11-29 RX ORDER — PHENAZOPYRIDINE HYDROCHLORIDE 200 MG/1
200 TABLET, FILM COATED ORAL ONCE
Status: COMPLETED | OUTPATIENT
Start: 2023-11-29 | End: 2023-11-29

## 2023-11-29 RX ORDER — ONDANSETRON 4 MG/1
4 TABLET, FILM COATED ORAL EVERY 6 HOURS PRN
Status: DISCONTINUED | OUTPATIENT
Start: 2023-11-29 | End: 2023-11-30 | Stop reason: HOSPADM

## 2023-11-29 RX ORDER — DOCUSATE SODIUM 100 MG/1
100 CAPSULE, LIQUID FILLED ORAL 2 TIMES DAILY PRN
Status: DISCONTINUED | OUTPATIENT
Start: 2023-11-29 | End: 2023-11-30 | Stop reason: HOSPADM

## 2023-11-29 RX ORDER — FENTANYL CITRATE 50 UG/ML
50 INJECTION, SOLUTION INTRAMUSCULAR; INTRAVENOUS
Status: DISCONTINUED | OUTPATIENT
Start: 2023-11-29 | End: 2023-11-29 | Stop reason: HOSPADM

## 2023-11-29 RX ORDER — IPRATROPIUM BROMIDE AND ALBUTEROL SULFATE 2.5; .5 MG/3ML; MG/3ML
3 SOLUTION RESPIRATORY (INHALATION) ONCE AS NEEDED
Status: DISCONTINUED | OUTPATIENT
Start: 2023-11-29 | End: 2023-11-29 | Stop reason: HOSPADM

## 2023-11-29 RX ORDER — MAGNESIUM HYDROXIDE 1200 MG/15ML
LIQUID ORAL AS NEEDED
Status: DISCONTINUED | OUTPATIENT
Start: 2023-11-29 | End: 2023-11-29 | Stop reason: HOSPADM

## 2023-11-29 RX ORDER — NALOXONE HCL 0.4 MG/ML
0.4 VIAL (ML) INJECTION
Status: DISCONTINUED | OUTPATIENT
Start: 2023-11-29 | End: 2023-11-30 | Stop reason: HOSPADM

## 2023-11-29 RX ORDER — IBUPROFEN 600 MG/1
600 TABLET ORAL EVERY 6 HOURS PRN
Status: DISCONTINUED | OUTPATIENT
Start: 2023-11-29 | End: 2023-11-30 | Stop reason: HOSPADM

## 2023-11-29 RX ORDER — HYDROMORPHONE HYDROCHLORIDE 1 MG/ML
0.5 INJECTION, SOLUTION INTRAMUSCULAR; INTRAVENOUS; SUBCUTANEOUS
Status: DISCONTINUED | OUTPATIENT
Start: 2023-11-29 | End: 2023-11-29 | Stop reason: HOSPADM

## 2023-11-29 RX ORDER — FENTANYL CITRATE 50 UG/ML
INJECTION, SOLUTION INTRAMUSCULAR; INTRAVENOUS AS NEEDED
Status: DISCONTINUED | OUTPATIENT
Start: 2023-11-29 | End: 2023-11-29 | Stop reason: SURG

## 2023-11-29 RX ORDER — BUPIVACAINE HYDROCHLORIDE AND EPINEPHRINE 2.5; 5 MG/ML; UG/ML
INJECTION, SOLUTION INFILTRATION; PERINEURAL AS NEEDED
Status: DISCONTINUED | OUTPATIENT
Start: 2023-11-29 | End: 2023-11-29 | Stop reason: HOSPADM

## 2023-11-29 RX ADMIN — PROPOFOL 50 MG: 10 INJECTION, EMULSION INTRAVENOUS at 19:24

## 2023-11-29 RX ADMIN — ROCURONIUM BROMIDE 20 MG: 10 INJECTION, SOLUTION INTRAVENOUS at 18:02

## 2023-11-29 RX ADMIN — LIDOCAINE HYDROCHLORIDE 80 MG: 20 INJECTION, SOLUTION INFILTRATION; PERINEURAL at 17:01

## 2023-11-29 RX ADMIN — ONDANSETRON 4 MG: 2 INJECTION INTRAMUSCULAR; INTRAVENOUS at 19:31

## 2023-11-29 RX ADMIN — HYDROMORPHONE HYDROCHLORIDE 0.5 MG: 1 INJECTION, SOLUTION INTRAMUSCULAR; INTRAVENOUS; SUBCUTANEOUS at 19:26

## 2023-11-29 RX ADMIN — HYDROMORPHONE HYDROCHLORIDE 0.5 MG: 1 INJECTION, SOLUTION INTRAMUSCULAR; INTRAVENOUS; SUBCUTANEOUS at 18:19

## 2023-11-29 RX ADMIN — HYDROMORPHONE HYDROCHLORIDE 0.5 MG: 1 INJECTION, SOLUTION INTRAMUSCULAR; INTRAVENOUS; SUBCUTANEOUS at 20:43

## 2023-11-29 RX ADMIN — KETOROLAC TROMETHAMINE 30 MG: 30 INJECTION, SOLUTION INTRAMUSCULAR; INTRAVENOUS at 19:19

## 2023-11-29 RX ADMIN — SUGAMMADEX 200 MG: 100 INJECTION, SOLUTION INTRAVENOUS at 19:32

## 2023-11-29 RX ADMIN — PROPOFOL 200 MG: 10 INJECTION, EMULSION INTRAVENOUS at 17:01

## 2023-11-29 RX ADMIN — PHENAZOPYRIDINE 200 MG: 200 TABLET ORAL at 14:05

## 2023-11-29 RX ADMIN — FAMOTIDINE 20 MG: 10 INJECTION INTRAVENOUS at 14:34

## 2023-11-29 RX ADMIN — SODIUM CHLORIDE, POTASSIUM CHLORIDE, SODIUM LACTATE AND CALCIUM CHLORIDE 9 ML/HR: 600; 310; 30; 20 INJECTION, SOLUTION INTRAVENOUS at 14:35

## 2023-11-29 RX ADMIN — DEXAMETHASONE SODIUM PHOSPHATE 8 MG: 4 INJECTION, SOLUTION INTRAMUSCULAR; INTRAVENOUS at 17:14

## 2023-11-29 RX ADMIN — FENTANYL CITRATE 50 MCG: 50 INJECTION, SOLUTION INTRAMUSCULAR; INTRAVENOUS at 17:00

## 2023-11-29 RX ADMIN — CEFOXITIN SODIUM 2 G: 2 POWDER, FOR SOLUTION INTRAVENOUS at 16:50

## 2023-11-29 RX ADMIN — CEFOXITIN SODIUM 2 G: 2 POWDER, FOR SOLUTION INTRAVENOUS at 18:46

## 2023-11-29 RX ADMIN — ROCURONIUM BROMIDE 50 MG: 10 INJECTION, SOLUTION INTRAVENOUS at 17:02

## 2023-11-29 NOTE — H&P
[facility]  Stephanie Cobb  : 1985  MRN: [id number]  CSN: [id number]    History and Physical  No chief complaint on file.    Subjective   Stephanie Cobb is a 38 y.o. year old  who present for surgery due to worsening and recurrent rectocele.  Patient also has a history of first-degree uterine prolapse menorrhagia abnormal uterine bleeding and intermittent dyspareunia.  She is starting to have to insert a finger into the vaginal canal to initiate bowel movements.  She is admitted for a TLH and posterior vaginal repair risk of bleeding infection injury to other organs possible need for corrective follow-up surgery reviewed risk of anesthetic complications discussed..    Past Medical History:   Diagnosis Date    Abnormal ultrasound cardiogram     F/U DR ABRAMS WAS NEGATIVE    Contact dermatitis     FOLLOWED BY DERMATOLOGY - CHRONIC    Gastroesophageal reflux disease without esophagitis 2020    History of COVID-19 2021    Hx of blood clots     S/P OVARIAN CYSTECTOMY  - RIGHT LEG SUPERFICIAL    Migraine     Palpitations     PACS AND PVC'S    Rectocele     Uterine prolapse      Past Surgical History:   Procedure Laterality Date    DIAGNOSTIC LAPAROSCOPY Left 2018    Procedure: DIAGNOSTIC LAPAROSCOPY/LT.OVARIAN CYSTECTOMY;  Surgeon: Juan Ramon Dias MD;  Location: [facility] MAIN OR;  Service: Obstetrics/Gynecology    SHOULDER ARTHROSCOPY Right 2022    Procedure: RIGHT SHOULDER ARTHROSCOPY WITH CAPSULAR AND LABRAL REPAIR;  Surgeon: Kristian Lang MD;  Location: [facility] OR OSC;  Service: Orthopedics;  Laterality: Right;    TUBAL COAGULATION LAPAROSCOPIC Bilateral 2019    Procedure: POSTPARTUM TUBAL LIGATION;  Surgeon: Juan Ramon Dias MD;  Location: [facility] LABOR DELIVERY;  Service: Obstetrics/Gynecology    WISDOM TOOTH EXTRACTION       Social History    Tobacco Use      Smoking status: Never      Smokeless tobacco: Never    No current facility-administered medications for this  encounter.    Current Outpatient Medications:     Ascorbic Acid (VITAMIN C PO), Take 1 tablet by mouth Daily. HOLDING FOR DOS, Disp: , Rfl:     Erenumab-aooe (AIMOVIG) 140 MG/ML auto-injector, Inject 1 mL under the skin into the appropriate area as directed Every 30 (Thirty) Days., Disp: 1 mL, Rfl: 11    ondansetron (Zofran) 4 MG tablet, Take 1 tablet by mouth Every 8 (Eight) Hours As Needed., Disp: , Rfl:     promethazine (PHENERGAN) 12.5 MG tablet, Take 1 tablet by mouth Every 6 (Six) Hours As Needed for Nausea or Vomiting., Disp: 20 tablet, Rfl: 0    SUMAtriptan (IMITREX) 50 MG tablet, Take 1 tablet by mouth 1 (One) Time As Needed for Migraine. May repeat 1 tablet in 2 hours if headache not relieved. Max of 2 doses in 24 hours., Disp: 9 tablet, Rfl: 3    Allergies   Allergen Reactions    Oxycodone Nausea And Vomiting and Other (See Comments)     HEAD SPINNING    Latex Rash     Tested positive on allergy test       Review of Systems      Except as outlined in history of physical illness, patient denies any changes in her GYN, , GI systems. All other systems reviewed are negative.        Objective   LMP 11/12/2023 (Approximate)   General: well developed; well nourished  no acute distress  appears stated age   Heart: regular rate and rhythm, S1, S2 normal, no murmur, click, rub or gallop   Lungs: breathing is unlabored  clear to auscultation bilaterally   Abdomen: soft, non-tender; no masses  no umbilical or inguinal hernias are present  no hepato-splenomegaly   Pelvis:: Clinical staff was present for exam  External genitalia normal female vagina shows a second possible third-degree rectocele, cervix and uterus have a little bit of relaxation consistent with first-degree uterovaginal prolapse.  Uterus is nonenlarged adnexa nonenlarged nontender rectal exam confirms rectocele   Labs  Lab Results   Component Value Date     11/24/2023    HGB 12.3 11/24/2023    HCT 37.0 11/24/2023    WBC 3.95 11/24/2023    NA  140 04/21/2023    K 4.4 04/21/2023     04/21/2023    CO2 25.1 04/21/2023    BUN 9 04/21/2023    CREATININE 0.70 04/21/2023    GLUCOSE 88 04/21/2023    ALBUMIN 4.6 04/21/2023    CALCIUM 9.5 04/21/2023    AST 10 04/21/2023    ALT 10 04/21/2023    BILITOT 0.4 04/21/2023        Assessment   Worsening rectocele  Uterine prolapse  Menorrhagia  Dysmenorrhea  Mitten dyspareunia     Plan   Exam under anesthesia, TLH bilateral salpingectomy, posterior colporrhaphy  Risk benefits reviewed potential complications of surgery discussed    Juan Ramon Dias MD  11/29/2023  13:06 EST       EMR Dragon/ Transcription disclaimer:  Much of the encounter note is an electronic transcription/translation of spoken language to printed text. The electronic translation of spoken language may permit erroneous, or at times, nonessential words or phrases to be inadvertently transcribes; Although i have reviewed the note for such errors, some may still exist.

## 2023-11-29 NOTE — ANESTHESIA PREPROCEDURE EVALUATION
Anesthesia Evaluation     Patient summary reviewed and Nursing notes reviewed                Airway   Mallampati: I  No difficulty expected  Dental - normal exam     Pulmonary - negative pulmonary ROS and normal exam   (-) not a smoker  Cardiovascular - negative cardio ROS and normal exam        Neuro/Psych  (+) headaches  GI/Hepatic/Renal/Endo    (+) GERD  (-) no renal disease, diabetes, no thyroid disorder    Musculoskeletal     Abdominal    Substance History      OB/GYN          Other   arthritis,                 Anesthesia Plan    ASA 2     general     (I have reviewed all pertinent information including medical history,allergies, imaging, studies and laboratory results. I have explained risks and benefits to anesthesia, including but not limited to; dental damage, sore throat, nausea, vomiting, aspiration, MI,stroke and death. Patient has agreed to proceed. )    Anesthetic plan, risks, benefits, and alternatives have been provided, discussed and informed consent has been obtained with: patient.    CODE STATUS:

## 2023-11-29 NOTE — ANESTHESIA PROCEDURE NOTES
Airway  Date/Time: 11/29/2023 5:05 PM  Airway not difficult    General Information and Staff    Patient location during procedure: OR  Anesthesiologist: Martinez Patel MD    Indications and Patient Condition    Preoxygenated: yes  Mask difficulty assessment: 2 - vent by mask + OA or adjuvant +/- NMBA    Final Airway Details  Final airway type: endotracheal airway      Successful airway: ETT  Cuffed: yes   Successful intubation technique: direct laryngoscopy  Facilitating devices/methods: intubating stylet  Endotracheal tube insertion site: oral  Blade: Ott  Blade size: 2  ETT size (mm): 7.5  Cormack-Lehane Classification: grade I - full view of glottis  Placement verified by: capnometry   Measured from: teeth  ETT/EBT  to teeth (cm): 21  Number of attempts at approach: 1  Assessment: lips, teeth, and gum same as pre-op and atraumatic intubation

## 2023-11-30 ENCOUNTER — READMISSION MANAGEMENT (OUTPATIENT)
Dept: CALL CENTER | Facility: HOSPITAL | Age: 38
End: 2023-11-30
Payer: MEDICAID

## 2023-11-30 VITALS
TEMPERATURE: 97.8 F | RESPIRATION RATE: 16 BRPM | OXYGEN SATURATION: 100 % | HEART RATE: 88 BPM | DIASTOLIC BLOOD PRESSURE: 76 MMHG | SYSTOLIC BLOOD PRESSURE: 111 MMHG

## 2023-11-30 LAB
DEPRECATED RDW RBC AUTO: 37.9 FL (ref 37–54)
ERYTHROCYTE [DISTWIDTH] IN BLOOD BY AUTOMATED COUNT: 11.7 % (ref 12.3–15.4)
HCT VFR BLD AUTO: 31.9 % (ref 34–46.6)
HGB BLD-MCNC: 11.1 G/DL (ref 12–15.9)
MCH RBC QN AUTO: 31.2 PG (ref 26.6–33)
MCHC RBC AUTO-ENTMCNC: 34.8 G/DL (ref 31.5–35.7)
MCV RBC AUTO: 89.6 FL (ref 79–97)
PLATELET # BLD AUTO: 249 10*3/MM3 (ref 140–450)
PMV BLD AUTO: 9.2 FL (ref 6–12)
RBC # BLD AUTO: 3.56 10*6/MM3 (ref 3.77–5.28)
WBC NRBC COR # BLD AUTO: 6.29 10*3/MM3 (ref 3.4–10.8)

## 2023-11-30 PROCEDURE — 99024 POSTOP FOLLOW-UP VISIT: CPT | Performed by: OBSTETRICS & GYNECOLOGY

## 2023-11-30 PROCEDURE — 25010000002 CEFAZOLIN IN DEXTROSE 2-4 GM/100ML-% SOLUTION: Performed by: OBSTETRICS & GYNECOLOGY

## 2023-11-30 PROCEDURE — 85027 COMPLETE CBC AUTOMATED: CPT | Performed by: OBSTETRICS & GYNECOLOGY

## 2023-11-30 PROCEDURE — G0378 HOSPITAL OBSERVATION PER HR: HCPCS

## 2023-11-30 RX ORDER — PSEUDOEPHEDRINE HCL 30 MG
100 TABLET ORAL 2 TIMES DAILY PRN
Qty: 14 EACH | Refills: 0 | Status: SHIPPED | OUTPATIENT
Start: 2023-11-30

## 2023-11-30 RX ORDER — IBUPROFEN 600 MG/1
600 TABLET ORAL EVERY 6 HOURS PRN
Qty: 24 TABLET | Refills: 0 | Status: SHIPPED | OUTPATIENT
Start: 2023-11-30

## 2023-11-30 RX ORDER — HYDROCODONE BITARTRATE AND ACETAMINOPHEN 5; 325 MG/1; MG/1
1 TABLET ORAL EVERY 6 HOURS PRN
Qty: 9 TABLET | Refills: 0 | Status: SHIPPED | OUTPATIENT
Start: 2023-11-30

## 2023-11-30 RX ADMIN — CEFAZOLIN SODIUM 2000 MG: 2 INJECTION, SOLUTION INTRAVENOUS at 00:47

## 2023-11-30 RX ADMIN — SIMETHICONE CHEW TAB 80 MG 80 MG: 80 TABLET ORAL at 00:10

## 2023-11-30 RX ADMIN — IBUPROFEN 600 MG: 600 TABLET, FILM COATED ORAL at 00:10

## 2023-11-30 RX ADMIN — CEFAZOLIN SODIUM 2000 MG: 2 INJECTION, SOLUTION INTRAVENOUS at 07:43

## 2023-11-30 RX ADMIN — SIMETHICONE CHEW TAB 80 MG 80 MG: 80 TABLET ORAL at 11:31

## 2023-11-30 RX ADMIN — DOCUSATE SODIUM 100 MG: 100 CAPSULE, LIQUID FILLED ORAL at 08:39

## 2023-11-30 RX ADMIN — IBUPROFEN 600 MG: 600 TABLET, FILM COATED ORAL at 05:35

## 2023-11-30 RX ADMIN — IBUPROFEN 600 MG: 600 TABLET, FILM COATED ORAL at 11:28

## 2023-11-30 RX ADMIN — DEXTROSE AND SODIUM CHLORIDE 100 ML/HR: 5; 450 INJECTION, SOLUTION INTRAVENOUS at 00:47

## 2023-11-30 NOTE — PROGRESS NOTES
Continued Stay Note  [facility]     Patient Name: Stephanie Cobb  MRN: [id number]  Today's Date: 11/30/2023    Admit Date: 11/29/2023    Plan: Home no needs   Discharge Plan       Row Name 11/30/23 1149       Plan    Plan Home no needs    Plan Comments Discharge order noted. Met with patient who confirmed DC plan is to return home. Stated her sister and/or spouse will assist as needed and will provide transportation at DC. Denies any needs/equipment.                   Discharge Codes    No documentation.                 Expected Discharge Date and Time       Expected Discharge Date Expected Discharge Time    Nov 30, 2023               Mi Costa RN

## 2023-11-30 NOTE — PROGRESS NOTES
Case Management Discharge Note      Final Note: Discharged home. Mi Costa RN         Selected Continued Care - Discharged on 11/30/2023 Admission date: 11/29/2023 - Discharge disposition: Home or Self Care       Transportation Services  Private: Car    Final Discharge Disposition Code: 01 - home or self-care

## 2023-11-30 NOTE — ANESTHESIA POSTPROCEDURE EVALUATION
Patient: Stephanie Cobb    Procedure Summary       Date: 11/29/23 Room / Location: [facility] OR [address] [place] [address] MAIN OR    Anesthesia Start: 1654 Anesthesia Stop: 2002    Procedures:       POSTERIOR VAGINAL REPAIR (Vagina)      TOTAL LAPAROSCOPIC HYSTERECTOMY (Abdomen) Diagnosis:       Uterine prolapse      Rectocele      (Uterine prolapse [N81.4])      (Rectocele [N81.6])    Surgeons: Juan Ramon Dias MD Provider: Martinez Patel MD    Anesthesia Type: general ASA Status: 2            Anesthesia Type: general    Vitals  Vitals Value Taken Time   /75 11/29/23 2030   Temp 36.8 °C (98.2 °F) 11/29/23 1956   Pulse 75 11/29/23 2041   Resp 16 11/29/23 2030   SpO2 99 % 11/29/23 2041   Vitals shown include unfiled device data.        Post Anesthesia Care and Evaluation    Patient location during evaluation: PACU  Patient participation: complete - patient participated  Level of consciousness: awake and alert  Pain management: adequate    Airway patency: patent  Anesthetic complications: No anesthetic complications  PONV Status: controlled  Cardiovascular status: acceptable  Respiratory status: acceptable  Hydration status: acceptable    Comments: /75   Pulse 78   Temp 36.8 °C (98.2 °F) (Oral)   Resp 16   LMP 11/12/2023 (Approximate)   SpO2 97%

## 2023-11-30 NOTE — PLAN OF CARE
Goal Outcome Evaluation:  Plan of Care Reviewed With: patient, family        Progress: improving  Outcome Evaluation: Afebrile,VS stable. IS to 1750. Lap sites x3 with small white border bandaids. Scant vaginal drainage. F/C with adequate u/o--orange in color. Will plan to remove f/c this am. No order to remove vag. pack. Vaginal packing with dry drainage. pt wishes to wait until f/c is removed to ambulate this am. She wishes to avoid narcotic meds and is taking ibuprofen for pain

## 2023-11-30 NOTE — OP NOTE
[facility]   Obstetrics and Gynecology   Operative Note    Date of Procedure: 11/29/2023    Patient:  Stephanie Cobb   MR#: [id number]    PREOPERATIVE DIAGNOSIS:   Uterine prolapse [N81.4]  Rectocele [N81.6]    Post-Op Diagnosis Codes:     * Uterine prolapse [N81.4]     * Rectocele [N81.6]      PROCEDURES PERFORMED:    1. Total laparoscopic hysterectomy.    2. Bilateral salpingectomy.  3.  Posterior vaginal repair    SURGEON: Juan Ramon Dias MD    ASSISTANT: Dr. Joyce Swan who assisted in exposure suturing delivery of the uterus and whose assistance was required to complete the surgery    ANESTHESIA: General.      ESTIMATED BLOOD LOSS: 150 cc mL.      SPECIMENS:   Order Name Source Comment Collection Info Order Time   TISSUE PATHOLOGY EXAM Uterus, Cervix, Bilateral Fallopian Tubes   Collected By: Juan Ramon Dias MD 11/29/2023  6:30 PM     Release to patient   Routine Release            COMPLICATIONS: None.      INDICATIONS: See the written history and physical.      DESCRIPTION OF PROCEDURE: The patient was taken to the operating room and placed in supine position. General anesthesia was administered.  The surgical time-out is completed for the procedure.  The patient's legs were positioned in Lucien stirrups and her arms were wrapped in protective foam padding and tucked at her side. She was prepped and draped in the usual sterile fashion.     Attention was turned vaginally. A weighted speculum was inserted. The cervix was grasped anteriorly with a single-tooth tenaculum. Anchoring stitches were placed in the cervix anteriorly and posteriorly with 0 Vicryl. The cervix was dilated to 12-Tamazight. Uterus sounds to 6-8 cm. The uterine manipulating device was then inserted into the uterus using the anchoring stitches threaded through the cup to seat the cervix within the uterine manipulating cup. The intrauterine balloon was inflated and attention was turned abdominally.     A small vertical  infraumbilical skin incision was made with the knife. The Veress needle was placed through the incision. The abdomen is insufflated with CO2 gas with normal pressures noted while insufflating. When an adequate pneumoperitoneum was achieved, a 5 mm bladeless trocar was inserted through the umbilical incision and the scope immediately thereafter. An initial survey of the abdomen provides no evidence of pathology with mild adhesive disease. The patient was placed in Trendelenburg. Accessory ports are then inserted under direct visualization after appropriate skin incisions were made in the left and right lower quadrant, 5 and 10 mm. The incision sites are made after transilluminating the abdomen to avoid the epigastric vessels.  Both trocars are atraumatic and inserted under direct visualization. Bowel was manipulated cephalad to expose the pelvis.  Bilaterally, the length of the tubal mesosalpinx is clamped, cauterized and transected with the harmonic scalpel.  The fallopian tube is amputated at the cornua on either side and removed.   Attention was turned to the left utero-ovarian ligament.  The utero-ovarain ligament is clamped, cauterized, and transected in serial bites along its length up to the level of the round ligament. The round ligament is clamped, cauterized, and transected in serial bites down to a level just above the uterine vessels on the left. The anterior reflection of the peritoneum was then opened and incised with the Harmonic scalpel to create a bladder flap. Attention is turned to the contralateral side and in an identical fashion, utero-ovarian ligament was clamped, cauterized, and transected along its length as is the round ligament down to the level just above the uterine vessels. The peritoneal incision is connected with the contralateral side and then the bladder is dissected inferiorly and the dissection of the bladder flap was completed to expose the ring of the uterine manipulator device.  The uterine vessels on the right were then clamped, cauterized, and transected using vascular mode of the Harmonic scalpel in serial bites. Identical procedure is repeated on the contralateral side.     Anterior colpotomy was performed after the vaginal occluder balloon was inflated. The colpotomy is carried out circumferentially with the active blade of the Harmonic scalpel on the upper region of the uterine manipulating ring. When the transsection is completed, the specimen is then delivered vaginally without difficulty. The operative site is copiously irrigated, inspected, and noted to be hemostatic.    We then turned off the gas and laparoscopic lites and removed vaginally.  Gloves were changed.  Vaginal cuff was closed with 0 Vicryl in a running interlocking fashion everything was hemostatic.  She had a large second possibly third-degree rectocele.  Vaginal introitus was clamped at 4:00 and 8:00 with Allis clamps.  Vaginal mucosa was injected with Marcaine with epinephrine.  A perineorrhaphy was performed in the usual fashion.  Vaginal mucosa posteriorly was injected then undermined with Metzenbaum scissors to the top of the vaginal canal.  Vaginal mucosa was dissected on either side off the rectovaginal fascia.  Rectocele was repaired using 2-0 chromic with running stitches in 3 separate layers imbricating each other.  We had a nice solid repair of the previous rectocele.  Excess vaginal mucosa was closed and trimmed.  Then vaginal mucosa was closed from the apex down to the introitus.  Remainder of the perineorrhaphy was completed.  Everything was hemostatic. the cuff is noted to be hemostatic upon closure.  Vaginal pack was placed    Attention was turned laparoscopically gloves were changed again.  CO2 was reinsufflated.  Operative sites were all inspected and irrigated.  A little John was sprinkled on the vaginal cuff.  Everything was found to be hemostatic.  Both ureters were seen to be peristalsing.   Orange stained urine was still coming from the Zuniga and none was ever seen in the operative field.  Cuff is reinspected and noted to remain hemostatic as are the pelvic sidewall pedicles. The abdomen was deflated. Valsalva is given by anesthesia to displace all remaining air. All ports are removed. Skin incisions are closed with 4-0 Vicryl in a subcuticular fashion. The sites are injected with 0.5% Marcaine. The patient is awakened and taken to the recovery room in stable condition.      SUMMARY: Uncomplicated total laparoscopic hysterectomy with bilateral salpingectomy.,  Posterior colporrhaphy and perineorrhaphy            Juan Ramon Dias MD  11/29/2023  19:44 EST

## 2023-11-30 NOTE — DISCHARGE SUMMARY
GYN discharge Rounds    Status post total laparoscopic hysterectomy bilateral salpingectomy and posterior colporrhaphy    SUBJECTIVE:  Patient appears comfortable,pain seems to be controlled with by oral medicines.  She is ambulating.   Voices no concerns    Discussed advancing activity and diet.   Patient has already passed gas and has been ambulating without issues    OBJECTIVE:  Vital Signs: /76 (BP Location: Right arm, Patient Position: Sitting)   Pulse 88   Temp 97.8 °F (36.6 °C) (Oral)   Resp 16   LMP 11/12/2023 (Approximate)   SpO2 100%     Intake/Output Summary (Last 24 hours) at 11/30/2023 1155  Last data filed at 11/30/2023 1045  Gross per 24 hour   Intake 4231 ml   Output 2350 ml   Net 1881 ml       Constitutional: The patient is well nourished.  Cardiovascular:RRR  Resp: nonlabored breathing  Minimal vaginal discharge  Gastrointestinal: positive bowel sounds, non tender abdomen incisions x3 have bandages intact which are clean and dry  Extremities:no edema, normal  and non tender    LABS / IMAGING:  Lab Results (last 24 hours)       Procedure Component Value Units Date/Time    CBC (No Diff) [[id number]]  (Abnormal) Collected: 11/30/23 0601    Specimen: Blood Updated: 11/30/23 0640     WBC 6.29 10*3/mm3      RBC 3.56 10*6/mm3      Hemoglobin 11.1 g/dL      Hematocrit 31.9 %      MCV 89.6 fL      MCH 31.2 pg      MCHC 34.8 g/dL      RDW 11.7 %      RDW-SD 37.9 fl      MPV 9.2 fL      Platelets 249 10*3/mm3     Tissue Pathology Exam [[id number]] Collected: 11/29/23 1827    Specimen: Tissue from Uterus, Cervix, Bilateral Fallopian Tubes  Updated: 11/29/23 2151            ASSESSMENT AND PLAN:    Principal Problem:    Cystocele and rectocele with incomplete uterovaginal prolapse  Overview:  Active Problems:    Rectocele  Overview:    Uterine prolapse  Overview:  Resolved Problems:    * No resolved hospital problems. *     Patient is postop day #1  Status post TLH BS, posterior  vaginal repair  Patient is doing well  Advance diet as tolerated  Encourage ambulation. Pathology report pending  Patient request discharge  Will be sent home on ibuprofen Colace and [place] 5/3/2025  Patient will stay at pelvic rest  Has a follow-up appointment in 2 weeks  Stressed the importance of limited physical activity and making sure she avoid constipation  She will call us with any problems questions concerns         Juan Ramon Dias MD    11/30/2023  11:55 EST

## 2023-11-30 NOTE — OUTREACH NOTE
Prep Survey      Flowsheet Row Responses   [facility] facility patient discharged from? [facility]   Is LACE score < 7 ? Yes   Eligibility [facility]   Date of Admission 11/29/23   Date of Discharge 11/30/23   Discharge Disposition Home or Self Care   Discharge diagnosis TOTAL LAPAROSCOPIC HYSTERECTOMY   Does the patient have one of the following disease processes/diagnoses(primary or secondary)? General Surgery   Does the patient have Home health ordered? No   Is there a DME ordered? No   Prep survey completed? Yes            MUKUL BARNES - Registered Nurse

## 2023-12-01 ENCOUNTER — TRANSITIONAL CARE MANAGEMENT TELEPHONE ENCOUNTER (OUTPATIENT)
Dept: CALL CENTER | Facility: HOSPITAL | Age: 38
End: 2023-12-01
Payer: MEDICAID

## 2023-12-01 LAB
LAB AP CASE REPORT: NORMAL
PATH REPORT.FINAL DX SPEC: NORMAL
PATH REPORT.GROSS SPEC: NORMAL

## 2023-12-01 NOTE — OUTREACH NOTE
Call Center TCM Note      Flowsheet Row Responses   [facility] patient discharged from? [facility]   Does the patient have one of the following disease processes/diagnoses(primary or secondary)? General Surgery   TCM attempt successful? Yes   Call start time 0905   Call end time 0911   Discharge diagnosis TOTAL LAPAROSCOPIC HYSTERECTOMY   Person spoke with today (if not patient) and relationship patient   Meds reviewed with patient/caregiver? Yes   Is the patient having any side effects they believe may be caused by any medication additions or changes? No   Does the patient have all medications related to this admission filled (includes all antibiotics, pain medications, etc.) Yes   Is the patient taking all medications as directed (includes completed medication regime)? Yes   Comments Patient has a hospital followup scheduled on 12/13/2023   Does the patient have an appointment with their PCP within 7-14 days of discharge? Other   Nursing Interventions Patient desires to follow up with specialty only   Psychosocial issues? No   Did the patient receive a copy of their discharge instructions? Yes   Nursing interventions Reviewed instructions with patient   What is the patient's perception of their health status since discharge? Improving   Is the patient/caregiver able to teach back signs and symptoms of incisional infection? Increased redness, swelling or pain at the incisonal site, Fever   Is the patient/caregiver able to teach back steps to recovery at home? Set small, achievable goals for return to baseline health, Rest and rebuild strength, gradually increase activity   If the patient is a current smoker, are they able to teach back resources for cessation? Not a smoker   Is the patient/caregiver able to teach back the hierarchy of who to call/visit for symptoms/problems? PCP, Specialist, Home health nurse, Urgent Care, ED, 911 Yes   TCM call completed? Yes   Wrap up additional comments Doing well. No  needs or issues.   Call end time 0911   Would this patient benefit from a Referral to [facility] Social Work? No   Is the patient interested in additional calls from an ambulatory ? No            Melecio Hicks RN    12/1/2023, 09:11 EST

## 2023-12-01 NOTE — OUTREACH NOTE
Call Center TCM Note      Flowsheet Row Responses   [facility] patient discharged from? [facility]   Does the patient have one of the following disease processes/diagnoses(primary or secondary)? General Surgery   TCM attempt successful? No   Unsuccessful attempts Attempt 1   Call Status Left message            Melecio Hicks RN    12/1/2023, 08:45 EST

## 2023-12-05 ENCOUNTER — TELEPHONE (OUTPATIENT)
Dept: OBSTETRICS AND GYNECOLOGY | Age: 38
End: 2023-12-05
Payer: MEDICAID

## 2023-12-05 NOTE — TELEPHONE ENCOUNTER
"Pt has a\"spot\"  close to lt hip that has become very painful overnite and she passed a quarter size clot today. Pt concerned  "

## 2023-12-13 ENCOUNTER — OFFICE VISIT (OUTPATIENT)
Dept: OBSTETRICS AND GYNECOLOGY | Age: 38
End: 2023-12-13

## 2023-12-13 VITALS
DIASTOLIC BLOOD PRESSURE: 64 MMHG | BODY MASS INDEX: 27.47 KG/M2 | HEIGHT: 67 IN | SYSTOLIC BLOOD PRESSURE: 124 MMHG | WEIGHT: 175 LBS

## 2023-12-13 DIAGNOSIS — Z98.890 POST-OPERATIVE STATE: Primary | ICD-10-CM

## 2023-12-13 PROBLEM — N81.2 CYSTOCELE AND RECTOCELE WITH INCOMPLETE UTEROVAGINAL PROLAPSE: Status: RESOLVED | Noted: 2023-11-21 | Resolved: 2023-12-13

## 2023-12-13 PROBLEM — Z90.710 HISTORY OF HYSTERECTOMY: Status: ACTIVE | Noted: 2023-12-13

## 2023-12-13 PROBLEM — N94.10 FEMALE DYSPAREUNIA: Status: RESOLVED | Noted: 2023-11-21 | Resolved: 2023-12-13

## 2023-12-13 PROBLEM — N81.6 RECTOCELE: Status: RESOLVED | Noted: 2023-08-09 | Resolved: 2023-12-13

## 2023-12-13 PROBLEM — N81.4 UTERINE PROLAPSE: Status: RESOLVED | Noted: 2023-11-21 | Resolved: 2023-12-13

## 2023-12-13 NOTE — PROGRESS NOTES
Subjective       History of Present Illness  Stephanie Cobb is a 38 y.o. female is being seen today for patient is doing well, normal GI  function.  She has been extremely pleasantly surprised at how easy her recovery has been.  She has not had any vaginal bleeding or unusual discharge  Chief Complaint   Patient presents with    Post-op Follow-up     Post.vaginal repair,TLH   .        The following portions of the patient's history were reviewed and updated as appropriate: allergies, current medications, past family history, past medical history, past social history, past surgical history and problem list.    PAST MEDICAL HISTORY  Past Medical History:   Diagnosis Date    Abnormal ultrasound cardiogram     F/U DR ABRAMS WAS NEGATIVE    Contact dermatitis     FOLLOWED BY DERMATOLOGY - CHRONIC    Gastroesophageal reflux disease without esophagitis 2020    History of COVID-19 2021    Hx of blood clots     S/P OVARIAN CYSTECTOMY  - RIGHT LEG SUPERFICIAL    Migraine     Palpitations     PACS AND PVC'S    Rectocele     Uterine prolapse      OB History    Para Term  AB Living   4 4 4     4   SAB IAB Ectopic Molar Multiple Live Births           0 4      # Outcome Date GA Lbr Haseeb/2nd Weight Sex Delivery Anes PTL Lv   4 Term 19 39w2d 01:47 / 00:29 3941 g (8 lb 11 oz) M Vag-Spont EPI N BOOGIE      Birth Comments: scale1   3 Term 16 39w3d 04:05 / 01:49 3643 g (8 lb 0.5 oz) F Vag-Spont EPI N BOOGIE   2 Term 09/10/14 38w3d   M Vag-Spont EPI N BOOGIE   1 Term 10/11/12 40w0d   M Vag-Spont EPI N BOOGIE     Past Surgical History:   Procedure Laterality Date    ANTERIOR AND POSTERIOR VAGINAL REPAIR N/A 2023    Procedure: POSTERIOR VAGINAL REPAIR;  Surgeon: Juan Ramon Dias MD;  Location: [facility] OR;  Service: Obstetrics/Gynecology;  Laterality: N/A;    DIAGNOSTIC LAPAROSCOPY Left 2018    Procedure: DIAGNOSTIC LAPAROSCOPY/LT.OVARIAN CYSTECTOMY;  Surgeon: Juan Ramon Dias MD;  Location: [facility]  MAIN OR;  Service: Obstetrics/Gynecology    SHOULDER ARTHROSCOPY Right 7/20/2022    Procedure: RIGHT SHOULDER ARTHROSCOPY WITH CAPSULAR AND LABRAL REPAIR;  Surgeon: Kristian Lang MD;  Location: [facility] OR OSC;  Service: Orthopedics;  Laterality: Right;    TOTAL LAPAROSCOPIC HYSTERECTOMY N/A 11/29/2023    Procedure: TOTAL LAPAROSCOPIC HYSTERECTOMY;  Surgeon: Juan Ramon Dias MD;  Location: [facility] MAIN OR;  Service: Obstetrics/Gynecology;  Laterality: N/A;    TUBAL COAGULATION LAPAROSCOPIC Bilateral 05/02/2019    Procedure: POSTPARTUM TUBAL LIGATION;  Surgeon: Juan Ramon Dias MD;  Location: [facility] LABOR DELIVERY;  Service: Obstetrics/Gynecology    WISDOM TOOTH EXTRACTION       Family History   Problem Relation Age of Onset    Hypertension Father     Heart disease Paternal Uncle     Stroke Maternal Grandmother     Atrial fibrillation Maternal Grandmother     Thyroid nodules Maternal Grandmother     Aneurysm Maternal Grandfather     Hypertension Paternal Grandmother     Malig Hyperthermia Neg Hx     Breast cancer Neg Hx     Colon cancer Neg Hx      Social History     Tobacco Use   Smoking Status Never   Smokeless Tobacco Never       Current Outpatient Medications:     Ascorbic Acid (VITAMIN C PO), Take 1 tablet by mouth Daily. HOLDING FOR DOS, Disp: , Rfl:     docusate sodium 100 MG capsule, Take 1 capsule by mouth 2 (Two) Times a Day As Needed for Constipation., Disp: 14 each, Rfl: 0    Erenumab-aooe (AIMOVIG) 140 MG/ML auto-injector, Inject 1 mL under the skin into the appropriate area as directed Every 30 (Thirty) Days., Disp: 1 mL, Rfl: 11    ondansetron (Zofran) 4 MG tablet, Take 1 tablet by mouth Every 8 (Eight) Hours As Needed., Disp: , Rfl:     promethazine (PHENERGAN) 12.5 MG tablet, Take 1 tablet by mouth Every 6 (Six) Hours As Needed for Nausea or Vomiting., Disp: 20 tablet, Rfl: 0    SUMAtriptan (IMITREX) 50 MG tablet, Take 1 tablet by mouth 1 (One) Time As Needed for Migraine. May repeat 1 tablet in 2 hours  if headache not relieved. Max of 2 doses in 24 hours., Disp: 9 tablet, Rfl: 3    HYDROcodone-acetaminophen (NORCO) 5-325 MG per tablet, Take 1 tablet by mouth Every 6 (Six) Hours As Needed for Moderate Pain. (Patient not taking: Reported on 12/13/2023), Disp: 9 tablet, Rfl: 0    ibuprofen (ADVIL,MOTRIN) 600 MG tablet, Take 1 tablet by mouth Every 6 (Six) Hours As Needed for Mild Pain or Moderate Pain. (Patient not taking: Reported on 12/13/2023), Disp: 24 tablet, Rfl: 0  Immunization History   Administered Date(s) Administered    COVID-19 (PFIZER) Purple Cap Monovalent 08/17/2021, 09/07/2021    Tdap 03/01/2019       Review of Systems       Except as outlined in history of physical illness, patient denies any changes in her GYN, , GI systems. All other systems reviewed are negative.    Objective   Physical Exam   Alert and oriented, respirations unlabored, heart regular rate and rhythm   Pelvic external genitalia shows healing perineorrhaphy vaginal exam shows healing posterior repair and healing vaginal cuff she has excellent support there is no erythema no discharge bimanual exam is reassuring  Abdominal incisions are clean dry intact      Assessment & Plan   Diagnoses and all orders for this visit:    1. Post-operative state (Primary)                 No orders of the defined types were placed in this encounter.          EMR Dragon/ Transcription disclaimer:  Much of the encounter note is an electronic transcription/translation of spoken language to printed text. The electronic translation of spoken language may permit erroneous, or at times, nonessential words or phrases to be inadvertently transcribes; Although i have reviewed the note for such errors, some may still exist.  Answers submitted by the patient for this visit:  Other (Submitted on 12/12/2023)  Please describe your symptoms.: Post op  Have you had these symptoms before?: No  How long have you been having these symptoms?: 1-2 weeks  Please describe  any probable cause for these symptoms. : Surgery ;-)  Primary Reason for Visit (Submitted on 12/12/2023)  What is the primary reason for your visit?: Other

## 2024-01-16 ENCOUNTER — OFFICE VISIT (OUTPATIENT)
Dept: OBSTETRICS AND GYNECOLOGY | Age: 39
End: 2024-01-16

## 2024-01-16 VITALS
HEIGHT: 66 IN | SYSTOLIC BLOOD PRESSURE: 110 MMHG | BODY MASS INDEX: 28.61 KG/M2 | WEIGHT: 178 LBS | DIASTOLIC BLOOD PRESSURE: 64 MMHG

## 2024-01-16 DIAGNOSIS — Z98.890 POST-OPERATIVE STATE: Primary | ICD-10-CM

## 2024-01-16 DIAGNOSIS — Z90.710 HISTORY OF HYSTERECTOMY: ICD-10-CM

## 2024-01-16 PROBLEM — N92.0 MENORRHAGIA WITH REGULAR CYCLE: Status: RESOLVED | Noted: 2023-11-21 | Resolved: 2024-01-16

## 2024-01-16 NOTE — PROGRESS NOTES
Subjective       History of Present Illness  Stephanie Cobb is a 38 y.o. female is being seen today for postop visit from a [facility] bilateral salpingectomy and posterior repair  Patient continues to state how much better she feels since her surgery  She has a little right rib below her costal margin discomfort when she lifts heavy objects but otherwise GI  function is doing well  Chief Complaint   Patient presents with    Post-op Follow-up     H,Post.vag.repair 23   .        The following portions of the patient's history were reviewed and updated as appropriate: allergies, current medications, past family history, past medical history, past social history, past surgical history and problem list.    PAST MEDICAL HISTORY  Past Medical History:   Diagnosis Date    Abnormal ultrasound cardiogram     F/U DR ABRAMS WAS NEGATIVE    Contact dermatitis     FOLLOWED BY DERMATOLOGY - CHRONIC    Gastroesophageal reflux disease without esophagitis 2020    History of COVID-19 2021    Hx of blood clots     S/P OVARIAN CYSTECTOMY  - RIGHT LEG SUPERFICIAL    Migraine     Palpitations     PACS AND PVC'S    Rectocele     Uterine prolapse      OB History    Para Term  AB Living   4 4 4     4   SAB IAB Ectopic Molar Multiple Live Births           0 4      # Outcome Date GA Lbr Haseeb/2nd Weight Sex Delivery Anes PTL Lv   4 Term 19 39w2d 01:47 / 00:29 3941 g (8 lb 11 oz) M Vag-Spont EPI N BOOGIE      Birth Comments: scale1   3 Term 16 39w3d 04:05 / 01:49 3643 g (8 lb 0.5 oz) F Vag-Spont EPI N BOOGIE   2 Term 09/10/14 38w3d   M Vag-Spont EPI N BOOGIE   1 Term 10/11/12 40w0d   M Vag-Spont EPI N BOOGIE     Past Surgical History:   Procedure Laterality Date    ANTERIOR AND POSTERIOR VAGINAL REPAIR N/A 2023    Procedure: POSTERIOR VAGINAL REPAIR;  Surgeon: Juan Ramon Dias MD;  Location: [facility];  Service: Obstetrics/Gynecology;  Laterality: N/A;    DIAGNOSTIC LAPAROSCOPY Left 2018     Procedure: DIAGNOSTIC LAPAROSCOPY/LT.OVARIAN CYSTECTOMY;  Surgeon: Juan Ramon Dias MD;  Location: [facility] MAIN OR;  Service: Obstetrics/Gynecology    SHOULDER ARTHROSCOPY Right 7/20/2022    Procedure: RIGHT SHOULDER ARTHROSCOPY WITH CAPSULAR AND LABRAL REPAIR;  Surgeon: Kristian Lang MD;  Location: [facility] OR [facility];  Service: Orthopedics;  Laterality: Right;    TOTAL LAPAROSCOPIC HYSTERECTOMY N/A 11/29/2023    Procedure: TOTAL LAPAROSCOPIC HYSTERECTOMY;  Surgeon: Juan Ramon Dias MD;  Location: [facility] MAIN OR;  Service: Obstetrics/Gynecology;  Laterality: N/A;    TUBAL COAGULATION LAPAROSCOPIC Bilateral 05/02/2019    Procedure: POSTPARTUM TUBAL LIGATION;  Surgeon: Juan Ramon Dias MD;  Location: [facility] LABOR DELIVERY;  Service: Obstetrics/Gynecology    WISDOM TOOTH EXTRACTION       Family History   Problem Relation Age of Onset    Hypertension Father     Heart disease Paternal Uncle     Stroke Maternal Grandmother     Atrial fibrillation Maternal Grandmother     Thyroid nodules Maternal Grandmother     Aneurysm Maternal Grandfather     Hypertension Paternal Grandmother     Malig Hyperthermia Neg Hx     Breast cancer Neg Hx     Colon cancer Neg Hx      Social History     Tobacco Use   Smoking Status Never   Smokeless Tobacco Never       Current Outpatient Medications:     Ascorbic Acid (VITAMIN C PO), Take 1 tablet by mouth Daily. HOLDING FOR DOS, Disp: , Rfl:     docusate sodium 100 MG capsule, Take 1 capsule by mouth 2 (Two) Times a Day As Needed for Constipation., Disp: 14 each, Rfl: 0    Erenumab-aooe (AIMOVIG) 140 MG/ML auto-injector, Inject 1 mL under the skin into the appropriate area as directed Every 30 (Thirty) Days., Disp: 1 mL, Rfl: 11    ondansetron (Zofran) 4 MG tablet, Take 1 tablet by mouth Every 8 (Eight) Hours As Needed., Disp: , Rfl:     promethazine (PHENERGAN) 12.5 MG tablet, Take 1 tablet by mouth Every 6 (Six) Hours As Needed for Nausea or Vomiting., Disp: 20 tablet, Rfl: 0    SUMAtriptan (IMITREX)  50 MG tablet, Take 1 tablet by mouth 1 (One) Time As Needed for Migraine. May repeat 1 tablet in 2 hours if headache not relieved. Max of 2 doses in 24 hours., Disp: 9 tablet, Rfl: 3  Immunization History   Administered Date(s) Administered    COVID-19 (PFIZER) Purple Cap Monovalent 08/17/2021, 09/07/2021    Tdap 03/01/2019       Review of Systems       Except as outlined in history of physical illness, patient denies any changes in her GYN, , GI systems. All other systems reviewed are negative.    Objective   Physical Exam   Alert and oriented, respirations unlabored, heart regular rate and rhythm   Pelvic external genitalia normal vagina is clean dry and intact posterior repair is healed quite nicely she has excellent muscle tone excellent rectovaginal wall repair and vaginal cuff is well supported  Rectal exam confirms      Assessment & Plan   Diagnoses and all orders for this visit:    1. Post-operative state (Primary)    2. History of hysterectomy    Doing quite well, mammogram this summer, slow return to normal activities  No future Paps needed  Patient to call with any problems questions concerns otherwise             No orders of the defined types were placed in this encounter.          EMR Dragon/ Transcription disclaimer:  Much of the encounter note is an electronic transcription/translation of spoken language to printed text. The electronic translation of spoken language may permit erroneous, or at times, nonessential words or phrases to be inadvertently transcribes; Although i have reviewed the note for such errors, some may still exist.  Answers submitted by the patient for this visit:  Other (Submitted on 1/9/2024)  Please describe your symptoms.: Post op  Have you had these symptoms before?: Yes  How long have you been having these symptoms?: Greater than 2 weeks  Primary Reason for Visit (Submitted on 1/9/2024)  What is the primary reason for your visit?: Other

## 2024-01-31 ENCOUNTER — TELEPHONE (OUTPATIENT)
Dept: NEUROLOGY | Facility: CLINIC | Age: 39
End: 2024-01-31

## 2024-01-31 NOTE — TELEPHONE ENCOUNTER
Left vm concerning new scheduled appt. Due to provider being out of office on her original follow up date. New appt is on feb. 5th, Left a [facility] Message as well.

## 2024-02-02 NOTE — PROGRESS NOTES
Subjective   Stephanie Cobb is a 38 y.o. female presenting for follow-up for migraine headaches.  She has been taking Aimovig 140 mg every 30 days.  She does note that the first 2 weeks after the injection her headaches are well-controlled, but after that her headaches tend to increase in frequency until her next injection.  She has been using sumatriptan and as needed.  She has used rizatriptan as well in the past.  She recently went to urgent care for Toradol injection due to a headache that did not respond to the sumatriptan hand.  The Toradol injection did help and she inquires today if this would be something that she could take at home.    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    History of Present Illness     Review of Systems    I have reviewed and confirmed the accuracy of the patient's history: Chief complaint, HPI, ROS, Subjective, and Past Family Social History as entered by the MA/ELIZABETHN/RN. Sharon Contreras MA 02/02/24      Objective     Physical Examination:  General Appearance:  Well developed, well nourished, well groomed, alert, and cooperative.  HEENT: Normocephalic.    Neck and Spine: Normal range of motion.  Normal alignment. No mass or tenderness. No bruits.  Extremities: No edema or deformities. Normal joint ROM.  Skin: No rashes or birth marks.      Neurological examination:   Higher Integrative Function: Oriented to time, place, and person. Normal registration, recall attention span and concentration. Normal language including comprehension, spontaneous speech, repetition, reading, writing, naming and vocabulary. No neglect with normal visual-spatial function and construction. Normal fund of knowledge and higher integrative function.   CN II: Pupils are equal, round and reactive to light. Normal visual acuity and visual fields.   CN III IV VI: Extraocular movements are full without nystagmus.   CN V: Normal facial sensation and strength of muscles of  mastication.   CN VII: Facial movements are symmetric. No weakness.   CN VIII: Auditory acuity is normal.  CN IX & X: Symmetric palatal movement.   CN XI: Sternocleidomastoid and trapezius are normal. No weakness.   CN XII: The tongue is midline. No atrophy or fasciculations.  Motor: Normal muscle strength, bulk and tone in upper and lower extremities. No fasciculations, rigidity, spasticity, or abnormal movements.   Reflexes: 2+ in the upper and lower extremities. Plantar responses are flexor.   Sensation: Normal light touch, pinprick, vibration, temperature, and proprioception in the arms and legs.   Station and Gait: Normal gait and station.   Coordination: Finger to nose test shows no dysmetria. Rapid alternating movements are normal. Heel to shin is normal.           Assessment & Plan   Diagnoses and all orders for this visit:    1. Migraine with aura and without status migrainosus, not intractable (Primary)    We are going to try switching the Aimovig to Qulipta to see if this will help prevent the wearing off that she is seeing for about 2 weeks every month on the Aimovig.  Given that she is uninsured and that this would be quite expensive we are going to enroll her in the patient assistance program.  Hopefully she will be approved for this.  In the interim she will continue the Aimovig.  She will continue sumatriptan as needed.  I did let her know that she can administer Toradol herself at home, however since she has never given herself an injection we discussed that the next him she feels she needs 1 she will call and come into the office for this.  We can educate her at that time on how to administer and see if she is comfortable doing so.  She may bring a friend with her if she would prefer that option as well.    She will follow-up about 3 months after she has started the Qulipta.  If she is doing well on it and denies any problems she can cancel that follow-up and can be seen in 1 year.  Side effects of  Qulipta were reviewed with her.

## 2024-02-05 ENCOUNTER — OFFICE VISIT (OUTPATIENT)
Dept: NEUROLOGY | Facility: CLINIC | Age: 39
End: 2024-02-05

## 2024-02-05 VITALS
HEIGHT: 66 IN | WEIGHT: 175 LBS | OXYGEN SATURATION: 99 % | BODY MASS INDEX: 28.12 KG/M2 | DIASTOLIC BLOOD PRESSURE: 72 MMHG | SYSTOLIC BLOOD PRESSURE: 112 MMHG | HEART RATE: 62 BPM

## 2024-02-05 DIAGNOSIS — G43.109 MIGRAINE WITH AURA AND WITHOUT STATUS MIGRAINOSUS, NOT INTRACTABLE: Primary | ICD-10-CM

## 2024-02-05 PROCEDURE — 99214 OFFICE O/P EST MOD 30 MIN: CPT | Performed by: NURSE PRACTITIONER

## 2024-02-05 NOTE — LETTER
February 5, 2024       No Recipients    Patient: Stephanie Cobb   YOB: 1985   Date of Visit: 2/5/2024     Dear Joana Ray MD:       Thank you for referring Stephanie Cobb to me for evaluation. Below are the relevant portions of my assessment and plan of care.    If you have questions, please do not hesitate to call me. I look forward to following Stephanie along with you.         Sincerely,        KAHLIL Chiang        CC:   No Recipients    Marcell Dick APRN  02/05/24 1004  Sign when Signing Visit  Subjective  Stephanie Cobb is a 38 y.o. female presenting for follow-up for migraine headaches.  She has been taking Aimovig 140 mg every 30 days.  She does note that the first 2 weeks after the injection her headaches are well-controlled, but after that her headaches tend to increase in frequency until her next injection.  She has been using sumatriptan and as needed.  She has used rizatriptan as well in the past.  She recently went to urgent care for Toradol injection due to a headache that did not respond to the sumatriptan hand.  The Toradol injection did help and she inquires today if this would be something that she could take at home.    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    History of Present Illness     Review of Systems    I have reviewed and confirmed the accuracy of the patient's history: Chief complaint, HPI, ROS, Subjective, and Past Family Social History as entered by the MA/TERRELL/RN. Sharon Contreras MA 02/02/24      Objective    Physical Examination:  General Appearance:  Well developed, well nourished, well groomed, alert, and cooperative.  HEENT: Normocephalic.    Neck and Spine: Normal range of motion.  Normal alignment. No mass or tenderness. No bruits.  Extremities: No edema or deformities. Normal joint ROM.  Skin: No rashes or birth marks.      Neurological examination:   Higher Integrative Function: Oriented to time, place, and  person. Normal registration, recall attention span and concentration. Normal language including comprehension, spontaneous speech, repetition, reading, writing, naming and vocabulary. No neglect with normal visual-spatial function and construction. Normal fund of knowledge and higher integrative function.   CN II: Pupils are equal, round and reactive to light. Normal visual acuity and visual fields.   CN III IV VI: Extraocular movements are full without nystagmus.   CN V: Normal facial sensation and strength of muscles of mastication.   CN VII: Facial movements are symmetric. No weakness.   CN VIII: Auditory acuity is normal.  CN IX & X: Symmetric palatal movement.   CN XI: Sternocleidomastoid and trapezius are normal. No weakness.   CN XII: The tongue is midline. No atrophy or fasciculations.  Motor: Normal muscle strength, bulk and tone in upper and lower extremities. No fasciculations, rigidity, spasticity, or abnormal movements.   Reflexes: 2+ in the upper and lower extremities. Plantar responses are flexor.   Sensation: Normal light touch, pinprick, vibration, temperature, and proprioception in the arms and legs.   Station and Gait: Normal gait and station.   Coordination: Finger to nose test shows no dysmetria. Rapid alternating movements are normal. Heel to shin is normal.           Assessment & Plan  Diagnoses and all orders for this visit:    1. Migraine with aura and without status migrainosus, not intractable (Primary)    We are going to try switching the Aimovig to Qulipta to see if this will help prevent the wearing off that she is seeing for about 2 weeks every month on the Aimovig.  Given that she is uninsured and that this would be quite expensive we are going to enroll her in the patient assistance program.  Hopefully she will be approved for this.  In the interim she will continue the Aimovig.  She will continue sumatriptan as needed.  I did let her know that she can administer Toradol herself at  home, however since she has never given herself an injection we discussed that the next him she feels she needs 1 she will call and come into the office for this.  We can educate her at that time on how to administer and see if she is comfortable doing so.  She may bring a friend with her if she would prefer that option as well.    She will follow-up about 3 months after she has started the Qulipta.  If she is doing well on it and denies any problems she can cancel that follow-up and can be seen in 1 year.  Side effects of Qulipta were reviewed with her.

## 2024-02-06 ENCOUNTER — SPECIALTY PHARMACY (OUTPATIENT)
Dept: NEUROLOGY | Facility: CLINIC | Age: 39
End: 2024-02-06

## 2024-02-09 RX ORDER — PROMETHAZINE HYDROCHLORIDE 12.5 MG/1
12.5 TABLET ORAL EVERY 6 HOURS PRN
Qty: 20 TABLET | Refills: 0 | Status: SHIPPED | OUTPATIENT
Start: 2024-02-09

## 2024-02-14 ENCOUNTER — TELEPHONE (OUTPATIENT)
Dept: NEUROLOGY | Facility: CLINIC | Age: 39
End: 2024-02-14

## 2024-02-14 NOTE — TELEPHONE ENCOUNTER
"Caller: Stephanie Cobb \"Stephanie Cordoba\"    Relationship: Self    Best call back number: [phone]    What was the call regarding: PT CALLED BACK TO CHECK FOR UPDATE. I SPOKE W/ CHAYA AND WAS ADVISED NATIVIDAD VICKERS, APRN IS OUT TODAY AND EXPECTED TO BE BACK TOMORROW. IF PT'S MIGRAINE IS SEVERE/URGENT, COULD REPORT TO URGENT CARE FOR TORADOL INJECTION.    PT VERBALIZED UNDERSTANDING AND WILL CALL TO LET US KNOW IF SHE REPORTS TO URGENT CARE.    DOCUMENTING PER PROTOCOL.  "

## 2024-02-14 NOTE — TELEPHONE ENCOUNTER
Provider: NATIVIDAD SMILEY    Caller: PATIENT    Relationship to Patient: SELF    Phone Number: [phone]    Reason for Call: PT HAS A MIGRAINE TODAY THAT STARTED YESTERDAY AROUND 5 P.M. PT WAS TOLD BY PROVIDER THE NEXT TIME SHE HAD A MIGRAINE TO COME IN TO THE OFFICE TO GET A TORADOL INJECTION, THAT SHE DID NOT NEED AN APPT, AND SHE COULD COME INTO THE OFFICE TO GET THE TORADOL INJECTION AND TECH COULD TEACH HER HOW TO ADMINISTER THE INJECTIONS HERSELF. SHE WOULD LIKE TO COME IN AFTER 1 TODAY.  PLEASE CALL PT TO ADVISE     THANK YOU

## 2024-02-22 RX ORDER — SUMATRIPTAN 50 MG/1
50 TABLET, FILM COATED ORAL ONCE AS NEEDED
Qty: 9 TABLET | Refills: 3 | Status: SHIPPED | OUTPATIENT
Start: 2024-02-22

## 2024-02-27 ENCOUNTER — SPECIALTY PHARMACY (OUTPATIENT)
Dept: NEUROLOGY | Facility: CLINIC | Age: 39
End: 2024-02-27

## 2024-04-03 ENCOUNTER — TELEPHONE (OUTPATIENT)
Dept: OBSTETRICS AND GYNECOLOGY | Age: 39
End: 2024-04-03

## 2024-04-03 NOTE — TELEPHONE ENCOUNTER
Pt calling stating she had TLH/Posterior Vaginal Repair surgery on 11/29/23. Pt stating she has had some light spotting after intercourse since but was told this could be normal. Pt stating she had intercourse last night has had light spotting since. Pt asking if she should be concerned? Please advise if pt needs to be scheduled in office for evaluation.

## 2024-04-04 ENCOUNTER — OFFICE VISIT (OUTPATIENT)
Dept: OBSTETRICS AND GYNECOLOGY | Age: 39
End: 2024-04-04
Payer: MEDICAID

## 2024-04-04 VITALS
DIASTOLIC BLOOD PRESSURE: 70 MMHG | WEIGHT: 172 LBS | HEIGHT: 66 IN | BODY MASS INDEX: 27.64 KG/M2 | SYSTOLIC BLOOD PRESSURE: 124 MMHG

## 2024-04-04 DIAGNOSIS — N93.0 BLEEDING AFTER INTERCOURSE: Primary | ICD-10-CM

## 2024-04-04 DIAGNOSIS — N93.9 VAGINAL SPOTTING: ICD-10-CM

## 2024-04-04 LAB
BILIRUB BLD-MCNC: NEGATIVE MG/DL
CLARITY, POC: CLEAR
COLOR UR: YELLOW
GLUCOSE UR STRIP-MCNC: NEGATIVE MG/DL
KETONES UR QL: NEGATIVE
LEUKOCYTE EST, POC: ABNORMAL
NITRITE UR-MCNC: NEGATIVE MG/ML
PH UR: 7 [PH] (ref 5–8)
PROT UR STRIP-MCNC: ABNORMAL MG/DL
RBC # UR STRIP: ABNORMAL /UL
SP GR UR: 1.02 (ref 1–1.03)
UROBILINOGEN UR QL: NORMAL

## 2024-04-04 RX ORDER — ATOGEPANT 60 MG/1
60 TABLET ORAL
COMMUNITY
Start: 2024-03-30

## 2024-04-04 NOTE — PROGRESS NOTES
Subjective       History of Present Illness  Stephanie Cobb is a 38 y.o. female is being seen today for light spotting following intercourse.  Faraz had a total laparoscopic hysterectomy with bilateral salpingectomy with posterior repair in November of this past year.  She had done quite well but they recently had intercourse several times and noticed a little spotting.  Does not have any dysuria.  A urinalysis today was negative.  She describes the spotting is light.  Chief Complaint   Patient presents with    Gynecologic Exam     Gyn problem: Bleeding after intercourse, [facility] 23   .        The following portions of the patient's history were reviewed and updated as appropriate: allergies, current medications, past family history, past medical history, past social history, past surgical history and problem list.    PAST MEDICAL HISTORY  Past Medical History:   Diagnosis Date    Abnormal ultrasound cardiogram     F/U DR ABRAMS WAS NEGATIVE    Contact dermatitis     FOLLOWED BY DERMATOLOGY - CHRONIC    Gastroesophageal reflux disease without esophagitis 2020    History of COVID-19 2021    Hx of blood clots 2018    S/P OVARIAN CYSTECTOMY  - RIGHT LEG SUPERFICIAL    Migraine     Palpitations     PACS AND PVC'S    Rectocele     Uterine prolapse      OB History    Para Term  AB Living   4 4 4     4   SAB IAB Ectopic Molar Multiple Live Births           0 4      # Outcome Date GA Lbr Haseeb/2nd Weight Sex Type Anes PTL Lv   4 Term 19 39w2d 01:47 / 00:29 3941 g (8 lb 11 oz) M Vag-Spont EPI N BOOGIE      Birth Comments: scale1   3 Term 16 39w3d 04:05 / 01:49 3643 g (8 lb 0.5 oz) F Vag-Spont EPI N BOOGIE   2 Term 09/10/14 38w3d   M Vag-Spont EPI N BOOGIE   1 Term 10/11/12 40w0d   M Vag-Spont EPI N BOOGIE     Past Surgical History:   Procedure Laterality Date    ANTERIOR AND POSTERIOR VAGINAL REPAIR N/A 2023    Procedure: POSTERIOR VAGINAL REPAIR;  Surgeon: Juan Ramon Dias MD;  Location: [facility]  MAIN OR;  Service: Obstetrics/Gynecology;  Laterality: N/A;    DIAGNOSTIC LAPAROSCOPY Left 05/21/2018    Procedure: DIAGNOSTIC LAPAROSCOPY/LT.OVARIAN CYSTECTOMY;  Surgeon: Juan Ramon Dias MD;  Location: [facility] MAIN OR;  Service: Obstetrics/Gynecology    SHOULDER ARTHROSCOPY Right 7/20/2022    Procedure: RIGHT SHOULDER ARTHROSCOPY WITH CAPSULAR AND LABRAL REPAIR;  Surgeon: Kristian Lang MD;  Location: [facility] OR OSC;  Service: Orthopedics;  Laterality: Right;    TOTAL LAPAROSCOPIC HYSTERECTOMY N/A 11/29/2023    Procedure: TOTAL LAPAROSCOPIC HYSTERECTOMY;  Surgeon: Juan Ramon Dias MD;  Location: [facility] MAIN OR;  Service: Obstetrics/Gynecology;  Laterality: N/A;    TUBAL COAGULATION LAPAROSCOPIC Bilateral 05/02/2019    Procedure: POSTPARTUM TUBAL LIGATION;  Surgeon: Juan Ramon Dias MD;  Location: [facility] LABOR DELIVERY;  Service: Obstetrics/Gynecology    WISDOM TOOTH EXTRACTION       Family History   Problem Relation Age of Onset    Hypertension Father     Heart disease Paternal Uncle     Stroke Maternal Grandmother     Atrial fibrillation Maternal Grandmother     Thyroid nodules Maternal Grandmother     Aneurysm Maternal Grandfather     Hypertension Paternal Grandmother     Malig Hyperthermia Neg Hx     Breast cancer Neg Hx     Colon cancer Neg Hx      Social History     Tobacco Use   Smoking Status Never    Passive exposure: Never   Smokeless Tobacco Never       Current Outpatient Medications:     Ascorbic Acid (VITAMIN C PO), Take 1 tablet by mouth Daily. HOLDING FOR DOS, Disp: , Rfl:     Atogepant (Qulipta) 60 MG tablet, 1 tablet., Disp: , Rfl:     ondansetron (Zofran) 4 MG tablet, Take 1 tablet by mouth Every 8 (Eight) Hours As Needed., Disp: , Rfl:     promethazine (PHENERGAN) 12.5 MG tablet, Take 1 tablet by mouth Every 6 (Six) Hours As Needed for Nausea or Vomiting., Disp: 20 tablet, Rfl: 0    SUMAtriptan (IMITREX) 50 MG tablet, Take 1 tablet by mouth 1 (One) Time As Needed for Migraine. May repeat 1 tablet in 2  hours if headache not relieved. Max of 2 doses in 24 hours., Disp: 9 tablet, Rfl: 3    NON FORMULARY, UBRELVY SAMPLES (Patient not taking: Reported on 4/4/2024), Disp: , Rfl:   Immunization History   Administered Date(s) Administered    COVID-19 (PFIZER) Purple Cap Monovalent 08/17/2021, 09/07/2021    Tdap 03/01/2019       Review of Systems       Except as outlined in history of physical illness, patient denies any changes in her GYN, , GI systems. All other systems reviewed are negative.    Objective   Physical Exam   Alert and oriented, respirations unlabored, heart regular rate and rhythm   Pelvic external genitalia normal female introitus is normal posterior vaginal mucosa and wall is well supported well-healed.  Vaginal cuff is well supported and well-healed.  Anterior vaginal mucosa is normal.  There is however a 3 mm abrasion of the vaginal wall at the junction of the left lateral wall and vaginal cuff.  This appears superficial.  There is no active bleeding.  It was cauterized with silver nitrate Q-tips.  The remainder of the exam was reassuring good support.  Bimanual exam again confirmed excellent support no adnexal enlargement      Assessment & Plan   Diagnoses and all orders for this visit:    1. Bleeding after intercourse (Primary)  -     POC Urinalysis Dipstick    2. Vaginal spotting    Small little mucosal scrape as outlined above treated with silver nitrate.  Will state without intercourse for 14 days.  Call if it recurs again we can always reapply a touch of silver nitrate.  Otherwise very reassuring physical exam urine culture will be sent because of occasional frequency but no stress type of symptoms             Orders Placed This Encounter   Procedures    POC Urinalysis Dipstick     Order Specific Question:   Release to patient     Answer:   Routine Release [[id number]]           EMR Dragon/ Transcription disclaimer:  Much of the encounter note is an electronic transcription/translation of  spoken language to printed text. The electronic translation of spoken language may permit erroneous, or at times, nonessential words or phrases to be inadvertently transcribes; Although i have reviewed the note for such errors, some may still exist.

## 2024-04-08 LAB
BACTERIA UR CULT: ABNORMAL
OTHER ANTIBIOTIC SUSC ISLT: ABNORMAL

## 2024-04-08 RX ORDER — NITROFURANTOIN 25; 75 MG/1; MG/1
100 CAPSULE ORAL 2 TIMES DAILY
Qty: 14 CAPSULE | Refills: 0 | Status: SHIPPED | OUTPATIENT
Start: 2024-04-08 | End: 2024-04-15

## 2024-04-08 NOTE — PROGRESS NOTES
Please let Faraz know that while she did not have a UTI in the office she does have some resting bacteria present in her urine not enough to officially caught an infection but nonetheless I would like to treat that and I have sent a medicine for her to take to her pharmacy.

## 2024-04-26 LAB
ALBUMIN SERPL-MCNC: 4.1 G/DL (ref 3.5–5.2)
ALBUMIN/GLOB SERPL: 1.8 G/DL
ALP SERPL-CCNC: 39 U/L (ref 39–117)
ALT SERPL-CCNC: 14 U/L (ref 1–33)
AST SERPL-CCNC: 11 U/L (ref 1–32)
BASOPHILS # BLD AUTO: 0.03 10*3/MM3 (ref 0–0.2)
BASOPHILS NFR BLD AUTO: 0.6 % (ref 0–1.5)
BILIRUB SERPL-MCNC: 0.5 MG/DL (ref 0–1.2)
BUN SERPL-MCNC: 7 MG/DL (ref 6–20)
BUN/CREAT SERPL: 9.9 (ref 7–25)
CALCIUM SERPL-MCNC: 8.7 MG/DL (ref 8.6–10.5)
CHLORIDE SERPL-SCNC: 105 MMOL/L (ref 98–107)
CHOLEST SERPL-MCNC: 190 MG/DL (ref 0–200)
CO2 SERPL-SCNC: 24 MMOL/L (ref 22–29)
CREAT SERPL-MCNC: 0.71 MG/DL (ref 0.57–1)
EGFRCR SERPLBLD CKD-EPI 2021: 110.4 ML/MIN/1.73
EOSINOPHIL # BLD AUTO: 0.1 10*3/MM3 (ref 0–0.4)
EOSINOPHIL NFR BLD AUTO: 2 % (ref 0.3–6.2)
ERYTHROCYTE [DISTWIDTH] IN BLOOD BY AUTOMATED COUNT: 12.1 % (ref 12.3–15.4)
GLOBULIN SER CALC-MCNC: 2.3 GM/DL
GLUCOSE SERPL-MCNC: 82 MG/DL (ref 65–99)
HCT VFR BLD AUTO: 36.4 % (ref 34–46.6)
HDLC SERPL-MCNC: 40 MG/DL (ref 40–60)
HGB BLD-MCNC: 11.8 G/DL (ref 12–15.9)
IMM GRANULOCYTES # BLD AUTO: 0.01 10*3/MM3 (ref 0–0.05)
IMM GRANULOCYTES NFR BLD AUTO: 0.2 % (ref 0–0.5)
LDLC SERPL CALC-MCNC: 136 MG/DL (ref 0–100)
LYMPHOCYTES # BLD AUTO: 2.09 10*3/MM3 (ref 0.7–3.1)
LYMPHOCYTES NFR BLD AUTO: 40.8 % (ref 19.6–45.3)
MCH RBC QN AUTO: 29.6 PG (ref 26.6–33)
MCHC RBC AUTO-ENTMCNC: 32.4 G/DL (ref 31.5–35.7)
MCV RBC AUTO: 91.2 FL (ref 79–97)
MONOCYTES # BLD AUTO: 0.4 10*3/MM3 (ref 0.1–0.9)
MONOCYTES NFR BLD AUTO: 7.8 % (ref 5–12)
NEUTROPHILS # BLD AUTO: 2.49 10*3/MM3 (ref 1.7–7)
NEUTROPHILS NFR BLD AUTO: 48.6 % (ref 42.7–76)
NRBC BLD AUTO-RTO: 0 /100 WBC (ref 0–0.2)
PLATELET # BLD AUTO: 274 10*3/MM3 (ref 140–450)
POTASSIUM SERPL-SCNC: 3.8 MMOL/L (ref 3.5–5.2)
PROT SERPL-MCNC: 6.4 G/DL (ref 6–8.5)
RBC # BLD AUTO: 3.99 10*6/MM3 (ref 3.77–5.28)
SODIUM SERPL-SCNC: 137 MMOL/L (ref 136–145)
TRIGL SERPL-MCNC: 75 MG/DL (ref 0–150)
TSH SERPL DL<=0.005 MIU/L-ACNC: 1.17 UIU/ML (ref 0.27–4.2)
VLDLC SERPL CALC-MCNC: 14 MG/DL (ref 5–40)
WBC # BLD AUTO: 5.12 10*3/MM3 (ref 3.4–10.8)

## 2024-05-01 LAB
ALBUMIN SERPL-MCNC: 4.1 G/DL (ref 3.5–5.2)
ALBUMIN/GLOB SERPL: 1.8 G/DL
ALP SERPL-CCNC: 39 U/L (ref 39–117)
ALT SERPL-CCNC: 14 U/L (ref 1–33)
AST SERPL-CCNC: 11 U/L (ref 1–32)
BASOPHILS # BLD AUTO: 0.03 10*3/MM3 (ref 0–0.2)
BASOPHILS NFR BLD AUTO: 0.6 % (ref 0–1.5)
BILIRUB SERPL-MCNC: 0.5 MG/DL (ref 0–1.2)
BUN SERPL-MCNC: 7 MG/DL (ref 6–20)
BUN/CREAT SERPL: 11.7 (ref 7–25)
CALCIUM SERPL-MCNC: 8.7 MG/DL (ref 8.6–10.5)
CHLORIDE SERPL-SCNC: 105 MMOL/L (ref 98–107)
CHOLEST SERPL-MCNC: 190 MG/DL (ref 0–200)
CO2 SERPL-SCNC: 24 MMOL/L (ref 22–29)
CREAT SERPL-MCNC: 0.6 MG/DL (ref 0.57–1)
EGFRCR SERPLBLD CKD-EPI 2021: 116.5 ML/MIN/1.73
EOSINOPHIL # BLD AUTO: 0.1 10*3/MM3 (ref 0–0.4)
EOSINOPHIL NFR BLD AUTO: 2 % (ref 0.3–6.2)
ERYTHROCYTE [DISTWIDTH] IN BLOOD BY AUTOMATED COUNT: 12.1 % (ref 12.3–15.4)
GLOBULIN SER CALC-MCNC: 2.3 GM/DL
GLUCOSE SERPL-MCNC: 82 MG/DL (ref 65–99)
HCT VFR BLD AUTO: 36.4 % (ref 34–46.6)
HDLC SERPL-MCNC: 40 MG/DL (ref 40–60)
HGB BLD-MCNC: 11.8 G/DL (ref 12–15.9)
IMM GRANULOCYTES # BLD AUTO: 0.01 10*3/MM3 (ref 0–0.05)
IMM GRANULOCYTES NFR BLD AUTO: 0.2 % (ref 0–0.5)
LDLC SERPL CALC-MCNC: 136 MG/DL (ref 0–100)
LYMPHOCYTES # BLD AUTO: 2.09 10*3/MM3 (ref 0.7–3.1)
LYMPHOCYTES NFR BLD AUTO: 40.8 % (ref 19.6–45.3)
MCH RBC QN AUTO: 29.6 PG (ref 26.6–33)
MCHC RBC AUTO-ENTMCNC: 32.4 G/DL (ref 31.5–35.7)
MCV RBC AUTO: 91.2 FL (ref 79–97)
MONOCYTES # BLD AUTO: 0.4 10*3/MM3 (ref 0.1–0.9)
MONOCYTES NFR BLD AUTO: 7.8 % (ref 5–12)
NEUTROPHILS # BLD AUTO: 2.49 10*3/MM3 (ref 1.7–7)
NEUTROPHILS NFR BLD AUTO: 48.6 % (ref 42.7–76)
NRBC BLD AUTO-RTO: 0 /100 WBC (ref 0–0.2)
PLATELET # BLD AUTO: 274 10*3/MM3 (ref 140–450)
POTASSIUM SERPL-SCNC: 3.8 MMOL/L (ref 3.5–5.2)
PROT SERPL-MCNC: 6.4 G/DL (ref 6–8.5)
RBC # BLD AUTO: 3.99 10*6/MM3 (ref 3.77–5.28)
SODIUM SERPL-SCNC: 137 MMOL/L (ref 136–145)
TRIGL SERPL-MCNC: 75 MG/DL (ref 0–150)
TSH SERPL DL<=0.005 MIU/L-ACNC: 1.17 UIU/ML (ref 0.27–4.2)
VLDLC SERPL CALC-MCNC: 14 MG/DL (ref 5–40)
WBC # BLD AUTO: 5.12 10*3/MM3 (ref 3.4–10.8)

## 2024-05-03 ENCOUNTER — OFFICE VISIT (OUTPATIENT)
Dept: FAMILY MEDICINE CLINIC | Facility: CLINIC | Age: 39
End: 2024-05-03

## 2024-05-03 VITALS
BODY MASS INDEX: 27.8 KG/M2 | TEMPERATURE: 97.8 F | WEIGHT: 173 LBS | DIASTOLIC BLOOD PRESSURE: 70 MMHG | OXYGEN SATURATION: 98 % | SYSTOLIC BLOOD PRESSURE: 108 MMHG | HEART RATE: 78 BPM | HEIGHT: 66 IN

## 2024-05-03 DIAGNOSIS — K21.9 GASTROESOPHAGEAL REFLUX DISEASE WITHOUT ESOPHAGITIS: ICD-10-CM

## 2024-05-03 DIAGNOSIS — Z00.00 HEALTH MAINTENANCE EXAMINATION: Primary | ICD-10-CM

## 2024-05-03 RX ORDER — FAMOTIDINE 40 MG/1
40 TABLET, FILM COATED ORAL NIGHTLY PRN
Qty: 30 TABLET | Refills: 2 | Status: SHIPPED | OUTPATIENT
Start: 2024-05-03

## 2024-05-03 NOTE — PROGRESS NOTES
"Chief Complaint  Annual Exam (No complains doing well ) GERD.  Subjective        Stephanie A Reza presents to [facility]  History of Present Illness  Annual Exam.    Health Maintenance   Topic Date Due    MAMMOGRAM  Never done    ANNUAL PHYSICAL  Never done    COVID-19 Vaccine (3 - 2023-24 season) 09/01/2023    INFLUENZA VACCINE  08/01/2024    BMI FOLLOWUP  05/03/2025    PAP SMEAR  08/09/2026    TDAP/TD VACCINES (2 - Td or Tdap) 03/01/2029    HEPATITIS C SCREENING  Completed    Pneumococcal Vaccine 0-64  Aged Out       Diet: eating healthy  Exercise: exercise   GYN: s/p hysterectomy and prolapse and did great - ovaries are still there. It was both laparoscopic and endoscopy - rectal and peroneum repair . Doing great, no more paps.   Immunizations: UTD   Colon cancer screening: start at 45    Sleep/mental health: lot so night sweats. But no issues   Dentist: She is due - she has terrible teeth and missed her last apt.       She gets itchy and has seen allergist and derm, she has avoided chemicals she is allergic too - she thinks it is eating dairy. Qss on calcium     She has a hx of ulcers in her family - higher stress, she notices more abdominal pain, epigastric, burn, worst with tomatoes and radiates to her back around her rib cage to her scapula.  Low nausea,  does well with what she is eating.  She does feel like it is getting worst but she is sleeping better. Now getting 7-8 hours at night     Objective   Vital Signs:  /70   Pulse 78   Temp 97.8 °F (36.6 °C)   Ht 168 cm (66.14\")   Wt 78.5 kg (173 lb)   SpO2 98%   BMI 27.80 kg/m²   Estimated body mass index is 27.8 kg/m² as calculated from the following:    Height as of this encounter: 168 cm (66.14\").    Weight as of this encounter: 78.5 kg (173 lb).         Physical Exam  Vitals and nursing note reviewed.   Constitutional:       General: She is not in acute distress.     Appearance: Normal appearance. She is " well-developed.   HENT:      Head: Normocephalic.      Right Ear: Tympanic membrane and ear canal normal. There is no impacted cerumen.      Left Ear: Tympanic membrane and ear canal normal. There is no impacted cerumen.      Nose: Nose normal.   Eyes:      Conjunctiva/sclera: Conjunctivae normal.      Pupils: Pupils are equal, round, and reactive to light.   Neck:      Vascular: No carotid bruit.   Cardiovascular:      Rate and Rhythm: Normal rate and regular rhythm.      Heart sounds: Normal heart sounds. No murmur heard.  Pulmonary:      Effort: Pulmonary effort is normal. No respiratory distress.      Breath sounds: Normal breath sounds.   Abdominal:      General: Abdomen is flat. Bowel sounds are normal. There is no distension.      Tenderness: There is no abdominal tenderness.   Musculoskeletal:         General: Normal range of motion.   Skin:     General: Skin is warm and dry.      Findings: No rash.   Neurological:      Mental Status: She is alert and oriented to person, place, and time.   Psychiatric:         Behavior: Behavior normal.         Thought Content: Thought content normal.         Judgment: Judgment normal.        Result Review :    The following data was reviewed by: Joana Ray MD on 05/03/2024:      Comprehensive Metabolic Panel (04/25/2024 14:56)  CBC & Differential (04/25/2024 14:56)  Lipid Panel (04/25/2024 14:56)  TSH Rfx On Abnormal To Free T4 (04/25/2024 14:56)           Assessment and Plan     Diagnoses and all orders for this visit:    1. Health maintenance examination (Primary)    2. Gastroesophageal reflux disease without esophagitis  -     famotidine (PEPCID) 40 MG tablet; Take 1 tablet by mouth At Night As Needed for Heartburn.  Dispense: 30 tablet; Refill: 2      Here for annual exam, fasting labs reviewed with patient which are reassuring, immunizations up-to-date, colon cancer screening up-to-date, GYN health up-to-date with Dr. Dias, recent hysterectomy, ovaries still in  place but not needing Pap smears anymore, cardiovascular screening negative for symptoms, counseled patient to increase vegetable intake, water and 150 minutes of exercise weekly combining weightbearing exercises and aerobic activity.    Chronic GERD, present for years, not treated currently, not well-controlled, plan to start famotidine as above, dietary changes and handout given from [facility] and follow-up in 3 months.    She is possibly had some inner ear issues/dizziness, discussed adding Flonase OTC.  Due to time we will discuss further at next appointment.       Follow Up     Return in about 3 months (around 8/3/2024), or if symptoms worsen or fail to improve, for  GERD and dizziness .  Patient was given instructions and counseling regarding her condition or for health maintenance advice. Please see specific information pulled into the AVS if appropriate.         Answers submitted by the patient for this visit:  Primary Reason for Visit (Submitted on 5/2/2024)  What is the primary reason for your visit?: Abdominal Pain

## 2024-06-06 RX ORDER — RIMEGEPANT SULFATE 75 MG/75MG
1 TABLET, ORALLY DISINTEGRATING ORAL DAILY PRN
Qty: 16 TABLET | Refills: 0 | Status: SHIPPED | OUTPATIENT
Start: 2024-06-06

## 2024-07-11 ENCOUNTER — SPECIALTY PHARMACY (OUTPATIENT)
Dept: NEUROLOGY | Facility: CLINIC | Age: 39
End: 2024-07-11

## 2024-07-11 ENCOUNTER — OFFICE VISIT (OUTPATIENT)
Dept: NEUROLOGY | Facility: CLINIC | Age: 39
End: 2024-07-11
Payer: MEDICAID

## 2024-07-11 VITALS
HEART RATE: 71 BPM | WEIGHT: 169 LBS | DIASTOLIC BLOOD PRESSURE: 70 MMHG | OXYGEN SATURATION: 99 % | HEIGHT: 66 IN | SYSTOLIC BLOOD PRESSURE: 124 MMHG | BODY MASS INDEX: 27.16 KG/M2

## 2024-07-11 DIAGNOSIS — G43.109 MIGRAINE WITH AURA AND WITHOUT STATUS MIGRAINOSUS, NOT INTRACTABLE: Primary | ICD-10-CM

## 2024-07-11 RX ORDER — NEEDLES, SAFETY 25GX1"
NEEDLE, DISPOSABLE MISCELLANEOUS
Qty: 1 EACH | Refills: 1 | Status: SHIPPED | OUTPATIENT
Start: 2024-07-11 | End: 2024-07-11 | Stop reason: SDUPTHER

## 2024-07-11 RX ORDER — KETOROLAC TROMETHAMINE 30 MG/ML
30 VIAL (ML) INJECTION DAILY PRN
Qty: 1 ML | Refills: 1 | Status: SHIPPED | OUTPATIENT
Start: 2024-07-11 | End: 2024-07-11 | Stop reason: SDUPTHER

## 2024-07-11 RX ORDER — KETOROLAC TROMETHAMINE 30 MG/ML
30 VIAL (ML) INJECTION DAILY PRN
Qty: 1 ML | Refills: 1 | Status: SHIPPED | OUTPATIENT
Start: 2024-07-11

## 2024-07-11 RX ORDER — NEEDLES, SAFETY 25GX1"
NEEDLE, DISPOSABLE MISCELLANEOUS
Qty: 1 EACH | Refills: 1 | Status: SHIPPED | OUTPATIENT
Start: 2024-07-11

## 2024-07-11 NOTE — PROGRESS NOTES
Chief Complaint   Patient presents with    Migraine       Patient ID: Stephanie Cobb is a 38 y.o. female presenting for follow up for migraine headaches. She started Qulipta about 5 months ago. This is working very well for her. She has noticed increased dizziness lately. I gave her samples of Nurtec and this did help improve the dizziness. Does not help as much with the head pain she gets when she has a migraine though. Uses sumatriptan for that and it works well for her. She has tried samples of Ubrelvy in the past for her migraines but since she does not have insurance she could not afford the medication.     She received a Toradol injection for her migraines in the past and it did work well for her. It is difficulty for her to come into the office for this and she is requesting to be able to administer this at home. She has a neighbor who would be able to do this and a friend who is a nurse who she says can show them how to administer the injection.     HPI:    The following portions of the patient's history were reviewed and updated as appropriate: allergies, current medications, past family history, past medical history, past social history, past surgical history and problem list.    Interval history:    Review of Systems   Neurological:  Positive for headaches. Negative for dizziness, tremors, seizures, syncope, facial asymmetry, speech difficulty, weakness, light-headedness and numbness.        Vitals:    07/11/24 1148   BP: 124/70   Pulse: 71   SpO2: 99%       Neurologic Exam    Physical Exam  General: Well nourished, well developed, and in no acute distress.  HEENT: Normocephalic/atraumatic. Mucous membranes moist. Sclerae anicteric.   Heart: Regular rate and rhythm. No murmurs, rubs or gallops.  Lungs: Clear to auscultation bilaterally.  Skin: No notable rashes or lesions on the visible surfaces.   Extremities: No clubbing, cyanosis or significant edema.   Psychiatric: Pleasant, cooperative, and  appropriate.   Neurologic Exam:  Mental Status:  Alert and oriented. Speech is fluent. Comprehension is intact.   Cranial Nerves II-XII: Pupils equal, round, reactive to light. Extraocular movements are full and conjugate in all directions. Pursuit movements do not provoke any apparent dizziness or discomfort.  No nystagmus noted.  Hearing is intact to voice. Facial strength and sensation are preserved and symmetric. Tongue and palate midline. Voice non-hoarse, non-dysarthric.   Motor: Normal bulk and tone of bilateral upper and lower extremities. Strength is 5/5 in all 4 extremities both proximally and distally. There are no abnormal or involuntary movements noted.  Sensation: Intact to light touch throughout. Romberg was negative with no significant sway.   Coordination: Fully intact. Finger-to-nose performed accurately bilaterally.  Reflexes: The deep tendon reflexes are 2+/4 in bilateral biceps, brachioradialis, triceps, patella, and Achilles.  No pathologic reflexes were noted.  Gait: Normal. No ataxia or apraxia.         Procedures    Assessment/Plan:         Diagnoses and all orders for this visit:    1. Migraine with aura and without status migrainosus, not intractable (Primary)    She is doing well on Qulipta 60 mg daily.  She is on the free drug program but this medication.  I gave her samples of Nurtec and Ubrelvy, however given that she is able to be on the free drug program through [employer] with Qulipta it is likely that the Ubrelvy will be covered as well for her.  This worked well for her in the past.  If this does not improve her dizziness symptoms as well as the Nurtec she can call and we can try the free drug program for the Nurtec or keep her on samples.  She is not using this often. I also sent a prescription for Toradol IM for her to administer. She will call with any problems. She will call with any problems, otherwise is to follow-up in 1 year.       Nicole Ram MA

## 2024-07-12 ENCOUNTER — TELEPHONE (OUTPATIENT)
Dept: OBSTETRICS AND GYNECOLOGY | Age: 39
End: 2024-07-12

## 2024-07-12 ENCOUNTER — CLINICAL SUPPORT (OUTPATIENT)
Dept: OBSTETRICS AND GYNECOLOGY | Age: 39
End: 2024-07-12

## 2024-07-12 DIAGNOSIS — R39.9 UTI SYMPTOMS: Primary | ICD-10-CM

## 2024-07-12 DIAGNOSIS — R30.0 BURNING WITH URINATION: ICD-10-CM

## 2024-07-12 LAB
BILIRUB BLD-MCNC: NEGATIVE MG/DL
CLARITY, POC: CLEAR
COLOR UR: YELLOW
GLUCOSE UR STRIP-MCNC: NEGATIVE MG/DL
KETONES UR QL: NEGATIVE
LEUKOCYTE EST, POC: ABNORMAL
NITRITE UR-MCNC: NEGATIVE MG/ML
PH UR: 7 [PH] (ref 5–8)
PROT UR STRIP-MCNC: NEGATIVE MG/DL
RBC # UR STRIP: NEGATIVE /UL
SP GR UR: 1.01 (ref 1–1.03)
UROBILINOGEN UR QL: NORMAL

## 2024-07-12 RX ORDER — NITROFURANTOIN 25; 75 MG/1; MG/1
100 CAPSULE ORAL 2 TIMES DAILY
Qty: 14 CAPSULE | Refills: 0 | Status: SHIPPED | OUTPATIENT
Start: 2024-07-12 | End: 2024-07-19

## 2024-07-12 NOTE — TELEPHONE ENCOUNTER
Pt is c/o of uti symptoms she said she gets these every so often.  She is having burning with urination.     I sent a culture and the urinalysis is in her chart.  Can you send something in for her since it is Friday.?

## 2024-07-12 NOTE — PROGRESS NOTES
Pt came in to leave a urine sample c/o uti symptoms.  Woke up this morning early with urge and burning with urination.  Went back to sleep and woke up later with the same symptoms and some back pain.      Urine culture sent and urinalysis done in office.

## 2024-07-14 LAB
BACTERIA UR CULT: NORMAL
BACTERIA UR CULT: NORMAL

## 2024-07-22 ENCOUNTER — SPECIALTY PHARMACY (OUTPATIENT)
Dept: NEUROLOGY | Facility: CLINIC | Age: 39
End: 2024-07-22
Payer: MEDICAID

## 2024-07-23 RX ORDER — NEEDLES, SAFETY 25GX1"
NEEDLE, DISPOSABLE MISCELLANEOUS
Qty: 1 EACH | Refills: 1 | Status: SHIPPED | OUTPATIENT
Start: 2024-07-23

## 2024-07-23 RX ORDER — KETOROLAC TROMETHAMINE 30 MG/ML
30 VIAL (ML) INJECTION DAILY PRN
Qty: 1 ML | Refills: 1 | Status: SHIPPED | OUTPATIENT
Start: 2024-07-23

## 2024-08-01 RX ORDER — NEEDLES, SAFETY 25GX1"
NEEDLE, DISPOSABLE MISCELLANEOUS
Qty: 1 EACH | Refills: 1 | Status: SHIPPED | OUTPATIENT
Start: 2024-08-01

## 2024-08-01 RX ORDER — KETOROLAC TROMETHAMINE 30 MG/ML
30 VIAL (ML) INJECTION DAILY PRN
Qty: 1 ML | Refills: 1 | Status: SHIPPED | OUTPATIENT
Start: 2024-08-01 | End: 2024-08-01 | Stop reason: SDUPTHER

## 2024-08-01 RX ORDER — KETOROLAC TROMETHAMINE 30 MG/ML
30 VIAL (ML) INJECTION DAILY PRN
Qty: 1 ML | Refills: 1 | Status: SHIPPED | OUTPATIENT
Start: 2024-08-01

## 2024-08-06 RX ORDER — NEEDLES, SAFETY 25GX1"
NEEDLE, DISPOSABLE MISCELLANEOUS
Qty: 1 EACH | Refills: 1 | Status: SHIPPED | OUTPATIENT
Start: 2024-08-06

## 2024-08-06 RX ORDER — KETOROLAC TROMETHAMINE 30 MG/ML
30 VIAL (ML) INJECTION DAILY PRN
Qty: 1 ML | Refills: 1 | Status: SHIPPED | OUTPATIENT
Start: 2024-08-06

## 2024-08-16 ENCOUNTER — OFFICE VISIT (OUTPATIENT)
Dept: FAMILY MEDICINE CLINIC | Facility: CLINIC | Age: 39
End: 2024-08-16

## 2024-08-16 VITALS
DIASTOLIC BLOOD PRESSURE: 66 MMHG | SYSTOLIC BLOOD PRESSURE: 104 MMHG | WEIGHT: 169 LBS | TEMPERATURE: 97.8 F | HEIGHT: 66 IN | HEART RATE: 78 BPM | OXYGEN SATURATION: 98 % | BODY MASS INDEX: 27.16 KG/M2

## 2024-08-16 DIAGNOSIS — R10.11 RUQ ABDOMINAL PAIN: Primary | ICD-10-CM

## 2024-08-16 DIAGNOSIS — B35.1 ONYCHOMYCOSIS: ICD-10-CM

## 2024-08-16 NOTE — PROGRESS NOTES
"Chief Complaint  GI Problem (More disconfort  abdominal pain ,gerd getting worse previous med did not help )    Subjective        Stephanie Cobb presents to [facility] PRIMARY [facility]ARE  GI Problem      She has made changes to her diet, remove dairy but still having pleasant 39-year-old female here to follow-up for GI issues, ongoing symptoms, nausea, burning pain and soreness, and a new symptoms of squeezing pain.  And then one week of diarrhea and decreased appetite.  Then some constipation.  She has not noticed any food that has caused issues. It can be during the day or night.  She started famotidine 40 mg daily without improvement - she did not notice a difference. It seems to be escalating and worsening.      Objective   Vital Signs:  /66   Pulse 78   Temp 97.8 °F (36.6 °C)   Ht 167.6 cm (66\")   Wt 76.7 kg (169 lb)   SpO2 98%   BMI 27.28 kg/m²   Estimated body mass index is 27.28 kg/m² as calculated from the following:    Height as of this encounter: 167.6 cm (66\").    Weight as of this encounter: 76.7 kg (169 lb).      Physical Exam  Vitals and nursing note reviewed.   Constitutional:       General: She is not in acute distress.     Appearance: She is well-developed.   HENT:      Head: Normocephalic.      Nose: Nose normal.   Cardiovascular:      Rate and Rhythm: Normal rate and regular rhythm.      Heart sounds: Normal heart sounds. No murmur heard.  Pulmonary:      Effort: Pulmonary effort is normal. No respiratory distress.      Breath sounds: Normal breath sounds.   Abdominal:      Tenderness: There is abdominal tenderness.      Comments: Tenderness in the right upper quadrant, positive Almanzar's point nonsurgical.   Musculoskeletal:         General: Normal range of motion.   Skin:     General: Skin is warm and dry.      Findings: No rash.      Comments: Thickened left great toenail   Neurological:      Mental Status: She is alert and oriented to person, place, and time. "   Psychiatric:         Behavior: Behavior normal.         Thought Content: Thought content normal.         Judgment: Judgment normal.        Result Review :                Assessment and Plan   Diagnoses and all orders for this visit:    1. RUQ abdominal pain (Primary)  -     Cancel: US abdomen limited; Future  -     US abdomen limited; Future    2. Onychomycosis    Pleasant 39-year-old female here with symptoms of right upper quadrant abdominal pain that I think is gallbladder related positive Almanzar's test on exam.  Plan to get ultrasound as above, discussed limiting fatty foods to see if this will help improve her symptoms overall.  Follow-up after ultrasound available.    Some onychomycosis on her nail okay to apply tea tree oil or Vicks vapor rub.  At this point I would not do prescription medication       Follow Up   Return in about 9 months (around 5/16/2025), or if symptoms worsen or fail to improve, for Annual Physical with fasting labs prior.  Patient was given instructions and counseling regarding her condition or for health maintenance advice. Please see specific information pulled into the AVS if appropriate.

## 2024-08-20 ENCOUNTER — TELEPHONE (OUTPATIENT)
Dept: FAMILY MEDICINE CLINIC | Facility: CLINIC | Age: 39
End: 2024-08-20
Payer: MEDICAID

## 2024-08-20 DIAGNOSIS — Z13.220 SCREENING CHOLESTEROL LEVEL: ICD-10-CM

## 2024-08-20 DIAGNOSIS — Z13.6 SCREENING, HEART DISEASE, ISCHEMIC: ICD-10-CM

## 2024-08-20 DIAGNOSIS — Z00.00 HEALTH MAINTENANCE EXAMINATION: Primary | ICD-10-CM

## 2024-08-20 DIAGNOSIS — Z13.29 SCREENING FOR THYROID DISORDER: ICD-10-CM

## 2024-09-30 ENCOUNTER — OFFICE VISIT (OUTPATIENT)
Dept: FAMILY MEDICINE CLINIC | Facility: CLINIC | Age: 39
End: 2024-09-30
Payer: MEDICAID

## 2024-09-30 VITALS
WEIGHT: 161.4 LBS | DIASTOLIC BLOOD PRESSURE: 70 MMHG | BODY MASS INDEX: 25.94 KG/M2 | OXYGEN SATURATION: 100 % | HEART RATE: 72 BPM | SYSTOLIC BLOOD PRESSURE: 100 MMHG | TEMPERATURE: 98.2 F | HEIGHT: 66 IN

## 2024-09-30 DIAGNOSIS — L03.90 CELLULITIS, UNSPECIFIED CELLULITIS SITE: ICD-10-CM

## 2024-09-30 DIAGNOSIS — L60.0 INGROWN TOENAIL: Primary | ICD-10-CM

## 2024-09-30 PROCEDURE — 99213 OFFICE O/P EST LOW 20 MIN: CPT | Performed by: FAMILY MEDICINE

## 2024-09-30 RX ORDER — PREDNISONE 20 MG/1
TABLET ORAL
Qty: 15 TABLET | Refills: 0 | Status: CANCELLED | OUTPATIENT
Start: 2024-09-30

## 2024-09-30 RX ORDER — METHYLPREDNISOLONE 4 MG
TABLET, DOSE PACK ORAL
Qty: 1 EACH | Refills: 0 | Status: CANCELLED | OUTPATIENT
Start: 2024-09-30

## 2024-09-30 RX ORDER — INDOMETHACIN 50 MG/1
50 CAPSULE ORAL 3 TIMES DAILY PRN
Status: CANCELLED | OUTPATIENT
Start: 2024-09-30

## 2024-09-30 RX ORDER — CEPHALEXIN 500 MG/1
500 CAPSULE ORAL 3 TIMES DAILY
Status: CANCELLED
Start: 2024-09-30

## 2024-09-30 NOTE — PROGRESS NOTES
"Chief Complaint  Toe Pain (Painful red swollen toe was dealing with toenail fungus started yesterday with pain and redness no injury right great toe)    ACUTE CARE/WORK IN REQUESTED APPOINTMENT for complaints of pain, redness, and swelling of right great toe    New patient to me.  PCP, Dr. Ray.  Last seen about 6 weeks ago for an acute care visit regarding abdominal pain.  Briefly discussed her right great toe being toenail fungus and to treat conservatively with over-the-counter meds.    Today, complains of pain, redness, and swelling in her right great toe around her nailbed x 1 week.  She has been using various over-the-counter remedies to help with toenail fungus and trimming her nails to try to alleviate the toenail fungus.  Now having discomfort, along with redness and pain on the right great toenail, especially medial and proximal margins  No fever, no drainage.  No joint pain      Review of Systems   Constitutional:  Negative for fever and unexpected weight change.   Respiratory:  Negative for cough and shortness of breath.    Cardiovascular:  Negative for chest pain.        Subjective          Stephanie Cobb presents to [facility]    Objective   Vital Signs:   Vitals:    09/30/24 1309   BP: 100/70   BP Location: Right arm   Patient Position: Sitting   Cuff Size: Adult   Pulse: 72   Temp: 98.2 °F (36.8 °C)   SpO2: 100%   Weight: 73.2 kg (161 lb 6.4 oz)   Height: 167.6 cm (66\")      Body mass index is 26.05 kg/m².   Physical Exam  Constitutional:       General: She is not in acute distress.     Appearance: Normal appearance. She is not ill-appearing.   Feet:      Right foot:      Toenail Condition: Right toenails are abnormally thick and ingrown.      Comments: Right great toenail --both lateral and medial borders with inflammation, tenderness, redness and edema (especially medial border), along with proximal margin of toenail  No drainage, no joint pain  Evidence of very " trim toenail that has been cut at an angle on both medial and lateral borders  Neurological:      Mental Status: She is alert.        Result Review :     Common labs          11/24/2023    08:37 11/30/2023    06:01 4/25/2024    14:56   Common Labs   Glucose   82    BUN   7    Creatinine   0.60  C   Sodium   137    Potassium   3.8    Chloride   105    Calcium   8.7    Total Protein   6.4    Albumin   4.1    Total Bilirubin   0.5    Alkaline Phosphatase   39    AST (SGOT)   11    ALT (SGPT)   14    WBC 3.95  6.29  5.12    Hemoglobin 12.3  11.1  11.8    Hematocrit 37.0  31.9  36.4    Platelets 306  249  274    Total Cholesterol   190    Triglycerides   75    HDL Cholesterol   40    LDL Cholesterol    136       Details         C Corrected result                 Lab Results   Component Value Date    FERRITIN 53.00 04/21/2023    ZNJT28TI 27 (L) 10/05/2020                   Assessment and Plan    Diagnoses and all orders for this visit:    1. Ingrown toenail (Primary) --new acute diagnosis of right great toenail, mild/moderate involvement  Needs to start Augmentin 875 mg 1 p.o. twice daily x 1 week  Start Epsom salts 3 times daily.  Do not touch toenail, in future discussed proper trimming of toenail  If not better, worse then will need referral to podiatrist for removal of lateral margin of medial margin of great toenail (may call back for referral if needed.)    2. Cellulitis, unspecified cellulitis site -mild, see above plan    Other orders  -     amoxicillin-clavulanate (AUGMENTIN) 875-125 MG per tablet; Take 1 tablet by mouth 2 (Two) Times a Day.  Dispense: 14 tablet; Refill: 0        Follow Up   Return if symptoms worsen or fail to improve.  Patient was given instructions and counseling regarding her condition or for health maintenance advice. Please see specific information pulled into the AVS if appropriate.

## 2024-09-30 NOTE — PATIENT INSTRUCTIONS
Start Augmentin twice a day for 1 week    Epsom salts at least 2-3 times a day this week    If not better, or worse by end of the week or next week then may call for referral to podiatrist    In the future cut toenails straight across

## 2024-10-02 ENCOUNTER — TELEPHONE (OUTPATIENT)
Dept: FAMILY MEDICINE CLINIC | Facility: CLINIC | Age: 39
End: 2024-10-02
Payer: MEDICAID

## 2024-10-02 NOTE — TELEPHONE ENCOUNTER
----- Message from [facility] [employer] sent at 10/2/2024  3:24 PM EDT -----  Regarding: In grown toenail  Contact: [phone]  I saw dr jha on Monday for what appears to be an infected ingrown toenail.   I’m on antibiotics and soaking it daily in epsom salt per doctor’s orders.     The pain has definitely decreased some and it looks much less angry and red.     But I am experiencing some new numbness now around the top and left side of my nail. My  :)  really wanted me to ask if that’s normal or a sign of worsening.  I’m happy to continue waiting out the duration of the antibiotics but I (my ) don’t want to be foolish and ignore something problematic.

## 2024-10-09 DIAGNOSIS — B35.1 ONYCHOMYCOSIS: ICD-10-CM

## 2024-10-09 DIAGNOSIS — L60.0 INGROWN TOENAIL: Primary | ICD-10-CM

## 2024-10-09 DIAGNOSIS — L03.90 CELLULITIS, UNSPECIFIED CELLULITIS SITE: ICD-10-CM

## 2024-10-09 RX ORDER — ATOGEPANT 60 MG/1
60 TABLET ORAL DAILY
Qty: 16 TABLET | Refills: 0 | COMMUNITY
Start: 2024-10-09 | End: 2024-11-08

## 2024-10-21 ENCOUNTER — PATIENT MESSAGE (OUTPATIENT)
Dept: FAMILY MEDICINE CLINIC | Facility: CLINIC | Age: 39
End: 2024-10-21
Payer: MEDICAID

## 2024-10-21 DIAGNOSIS — R10.31 BILATERAL LOWER ABDOMINAL CRAMPING: ICD-10-CM

## 2024-10-21 DIAGNOSIS — R10.11 RUQ ABDOMINAL PAIN: ICD-10-CM

## 2024-10-21 DIAGNOSIS — R10.32 BILATERAL LOWER ABDOMINAL CRAMPING: ICD-10-CM

## 2024-10-21 DIAGNOSIS — K21.9 GASTROESOPHAGEAL REFLUX DISEASE WITHOUT ESOPHAGITIS: Primary | ICD-10-CM

## 2024-11-20 RX ORDER — ONDANSETRON 4 MG/1
4 TABLET, FILM COATED ORAL EVERY 8 HOURS PRN
Qty: 20 TABLET | Refills: 0 | Status: SHIPPED | OUTPATIENT
Start: 2024-11-20

## 2024-11-20 RX ORDER — ONDANSETRON 4 MG/1
4 TABLET, FILM COATED ORAL EVERY 8 HOURS PRN
Qty: 20 TABLET | Refills: 0 | Status: SHIPPED | OUTPATIENT
Start: 2024-11-20 | End: 2024-11-20 | Stop reason: SDUPTHER

## 2024-11-22 ENCOUNTER — PATIENT MESSAGE (OUTPATIENT)
Dept: FAMILY MEDICINE CLINIC | Facility: CLINIC | Age: 39
End: 2024-11-22

## 2024-11-22 ENCOUNTER — OFFICE VISIT (OUTPATIENT)
Dept: FAMILY MEDICINE CLINIC | Facility: CLINIC | Age: 39
End: 2024-11-22

## 2024-11-22 VITALS
SYSTOLIC BLOOD PRESSURE: 116 MMHG | TEMPERATURE: 97.8 F | HEART RATE: 66 BPM | DIASTOLIC BLOOD PRESSURE: 72 MMHG | WEIGHT: 157 LBS | BODY MASS INDEX: 25.23 KG/M2 | HEIGHT: 66 IN | OXYGEN SATURATION: 99 %

## 2024-11-22 DIAGNOSIS — L20.89 OTHER ATOPIC DERMATITIS: Primary | ICD-10-CM

## 2024-11-22 DIAGNOSIS — L30.9 DERMATITIS: ICD-10-CM

## 2024-11-22 NOTE — PROGRESS NOTES
"Chief Complaint  Rash (Nothing seem to resolve it and would like dr enriquez advise )    Subjective        Stephanie Cobb presents to [facility] PRIMARY [facility]ARE  History of Present Illness  Pleasant 39-year-old female here for an ongoing rash that has not improved.  It has been present since child coffman but with flair on and off, likely since she was 1 years old - but classically on arms and behind her knee.  In her 20s no issues, then came back after having her first kid, then it would totally go away.  In the last 5 years it has been more pervasive and hard to go away.  She has seen an allergist and did RAST testing and labs all negative, by blood, prick test only to air. Went to derm and wanted to do chemical patch testing and it came back to any normal ingredient in detergent/ soap/ shampoo/ perfume.  She did have a skin biopsy and lupus labs prior.  She has no candles, no fragrances. And it went away. One year ago, and then 6 months ago it came back.  She was itchy but without the rash.  She wondered if it was dairy. If it has any simulation with dairy and it helped. And now it is coming back again.  She is still very vigilant with avoiding the chemicals and dairy free foods. There has not been once change.     Objective   Vital Signs:  /72   Pulse 66   Temp 97.8 °F (36.6 °C)   Ht 167.6 cm (66\")   Wt 71.2 kg (157 lb)   SpO2 99%   BMI 25.34 kg/m²   Estimated body mass index is 25.34 kg/m² as calculated from the following:    Height as of this encounter: 167.6 cm (66\").    Weight as of this encounter: 71.2 kg (157 lb).      Physical Exam  Vitals and nursing note reviewed.   Constitutional:       General: She is not in acute distress.     Appearance: She is well-developed.   HENT:      Head: Normocephalic.      Nose: Nose normal.   Eyes:      General: No scleral icterus.  Pulmonary:      Effort: Pulmonary effort is normal. No respiratory distress.   Musculoskeletal:         General: Normal " range of motion.   Skin:     General: Skin is warm and dry.      Findings: No rash.      Comments: Rash starting from the left forehead, sparing the hairline at the scalp, worse over the eyebrows and eyelids to the bridge of the nose, extending down the neck, chest, arms torso and to the legs.  It is dry, no vesicles, mostly macular papular, some have a rounded appearance but mostly in large patches.  No scaling   Neurological:      Mental Status: She is alert and oriented to person, place, and time.   Psychiatric:         Behavior: Behavior normal.         Thought Content: Thought content normal.         Judgment: Judgment normal.        Result Review :                Assessment and Plan   Diagnoses and all orders for this visit:    1. Other atopic dermatitis (Primary)  -     Ambulatory Referral to Dermatology  -     MELITON With / DsDNA, RNP, Sjogrens A / B, Smith  -     C3+C4+MELITON+RA  -     Sedimentation rate, automated  -     C-reactive Protein  -     Celiac Disease Antibody Screen  -     Scleroderma Diagnostic Profile    2. Dermatitis  -     Ambulatory Referral to Dermatology  -     MELITON With / DsDNA, RNP, Sjogrens A / B, Smith  -     C3+C4+MELITON+RA  -     Sedimentation rate, automated  -     C-reactive Protein  -     Celiac Disease Antibody Screen  -     Scleroderma Diagnostic Profile    Very pleasant 39-year-old female here with a chronic history of dermatitis that started in childhood and has come and gone.  She has a complex of symptoms that are a little bit questionable.  She does have hypermobility, frequent dislocations of the right shoulder, frequent abdominal pain and a rash that comes and goes.    She has been tested for allergies, she does know that she is very sensitive to certain chemicals, is very careful to not have any fragrances in her home, candles, body lotion.  She is extremely strict with her diet and does not have much variety.  She has had improvement with avoiding these items yet these flares  continue to occur now about once a year but they will last for weeks.  They are extremely pruritic.    I really do not know what to make of it.  My thought is to investigate rheumatologic causes above, follow-up with dermatology possibly repeat skin biopsy/patch testing as is medically necessary or deemed helpful.    They follow her test are equivocal, we could go down the path of looking at EDS with mast cell activation syndrome.  Although her's history is not completely convincing.  The abdominal pain and rash do not correlate at the same time so it seems more autoimmune/allergic in nature.    We spent some time talking through these things, at this point she is open to naturopathic options but we discussed that there are a lot of options that do not have good evidence.  I would start with seeing Dr. Moy as she does have more experience with autoimmune dermatitis to see if she would recommend a different assessment or treatment.     I spent 45 minutes caring for Stephanie on this date of service. This time includes time spent by me in the following activities:preparing for the visit, reviewing tests, performing a medically appropriate examination and/or evaluation , counseling and educating the patient/family/caregiver, ordering medications, tests, or procedures, referring and communicating with other health care professionals , and documenting information in the medical record  Follow Up   Return in about 6 months (around 5/22/2025), or if symptoms worsen or fail to improve, for Recheck.  Patient was given instructions and counseling regarding her condition or for health maintenance advice. Please see specific information pulled into the AVS if appropriate.

## 2024-11-25 ENCOUNTER — CLINICAL SUPPORT (OUTPATIENT)
Dept: OBSTETRICS AND GYNECOLOGY | Age: 39
End: 2024-11-25
Payer: MEDICAID

## 2024-11-25 ENCOUNTER — TELEPHONE (OUTPATIENT)
Dept: OBSTETRICS AND GYNECOLOGY | Age: 39
End: 2024-11-25

## 2024-11-25 DIAGNOSIS — R30.0 BURNING WITH URINATION: Primary | ICD-10-CM

## 2024-11-25 DIAGNOSIS — R35.0 FREQUENT URINATION: ICD-10-CM

## 2024-11-25 LAB
ANA SER QL: POSITIVE
BILIRUB BLD-MCNC: NEGATIVE MG/DL
C3 SERPL-MCNC: 119 MG/DL (ref 82–167)
C4 SERPL-MCNC: 22 MG/DL (ref 12–38)
CLARITY, POC: CLEAR
COLOR UR: ABNORMAL
CRP SERPL-MCNC: <1 MG/L (ref 0–10)
ENA SCL70 AB SER-ACNC: <0.2 AI (ref 0–0.9)
ERYTHROCYTE [SEDIMENTATION RATE] IN BLOOD BY WESTERGREN METHOD: 5 MM/HR (ref 0–32)
GLIADIN PEPTIDE IGA SER-ACNC: 3 UNITS (ref 0–19)
GLUCOSE UR STRIP-MCNC: ABNORMAL MG/DL
IGA SERPL-MCNC: 132 MG/DL (ref 87–352)
KETONES UR QL: ABNORMAL
LEUKOCYTE EST, POC: ABNORMAL
NITRITE UR-MCNC: POSITIVE MG/ML
PH UR: 7 [PH] (ref 5–8)
PROT UR STRIP-MCNC: ABNORMAL MG/DL
RBC # UR STRIP: NEGATIVE /UL
RHEUMATOID FACT SERPL-ACNC: <10 IU/ML
SP GR UR: 1.01 (ref 1–1.03)
TTG IGA SER-ACNC: <2 U/ML (ref 0–3)
UROBILINOGEN UR QL: ABNORMAL

## 2024-11-25 RX ORDER — NITROFURANTOIN 25; 75 MG/1; MG/1
100 CAPSULE ORAL 2 TIMES DAILY
Qty: 14 CAPSULE | Refills: 0 | Status: SHIPPED | OUTPATIENT
Start: 2024-11-25

## 2024-11-25 NOTE — TELEPHONE ENCOUNTER
Patient left urine sample today. Presenting symptoms of frequent urination and burning sensation with urination. Urinalysis ran and resulted to you. Sent off for culture.

## 2024-11-27 LAB
BACTERIA UR CULT: ABNORMAL
OTHER ANTIBIOTIC SUSC ISLT: ABNORMAL

## 2024-12-03 DIAGNOSIS — L30.9 DERMATITIS: Primary | ICD-10-CM

## 2024-12-03 DIAGNOSIS — R53.83 MALAISE AND FATIGUE: ICD-10-CM

## 2024-12-03 DIAGNOSIS — R53.81 MALAISE AND FATIGUE: ICD-10-CM

## 2024-12-03 DIAGNOSIS — R76.8 POSITIVE ANA (ANTINUCLEAR ANTIBODY): ICD-10-CM

## 2024-12-18 ENCOUNTER — OFFICE VISIT (OUTPATIENT)
Dept: OBSTETRICS AND GYNECOLOGY | Age: 39
End: 2024-12-18

## 2024-12-18 VITALS
WEIGHT: 157 LBS | BODY MASS INDEX: 25.23 KG/M2 | HEIGHT: 66 IN | DIASTOLIC BLOOD PRESSURE: 68 MMHG | SYSTOLIC BLOOD PRESSURE: 108 MMHG

## 2024-12-18 DIAGNOSIS — Z87.440 HISTORY OF UTI: Primary | ICD-10-CM

## 2024-12-18 DIAGNOSIS — L30.9 DERMATITIS: ICD-10-CM

## 2024-12-18 DIAGNOSIS — N83.01 FOLLICULAR CYST OF RIGHT OVARY: ICD-10-CM

## 2024-12-18 DIAGNOSIS — N83.02 FOLLICULAR CYST OF LEFT OVARY: ICD-10-CM

## 2024-12-18 LAB
BILIRUB BLD-MCNC: NEGATIVE MG/DL
CLARITY, POC: CLEAR
COLOR UR: YELLOW
GLUCOSE UR STRIP-MCNC: NEGATIVE MG/DL
KETONES UR QL: NEGATIVE
LEUKOCYTE EST, POC: NEGATIVE
NITRITE UR-MCNC: NEGATIVE MG/ML
PH UR: 7 [PH] (ref 5–8)
PROT UR STRIP-MCNC: NEGATIVE MG/DL
RBC # UR STRIP: NEGATIVE /UL
SP GR UR: 1.01 (ref 1–1.03)
UROBILINOGEN UR QL: NORMAL

## 2024-12-18 NOTE — PROGRESS NOTES
Subjective       History of Present Illness  Stephanie Cobb is a 39 y.o. female is being seen today for further evaluation of an incidentally found right ovarian cyst when patient was having a CT scan for some abdominal pain.  At that time the CT scan revealed a right fluid-filled cyst approximately 4 cm.  Patient was asymptomatic.  She is in her reproductive years has previously had a TLH and left cystectomy.    Incidentally Ulices and has been struggling with some recurrent rashes she had a positive MELITON and there is the potential she might have lupus or so the other form of autoimmune disease process going on.  We talked about that in a little bit of detail.  Fortunately she has a follow-up visit with a rheumatologist in the near future.  Chief Complaint   Patient presents with    Gynecologic Exam     Gyn u/s: Rt.ovarian cyst on CT   .        The following portions of the patient's history were reviewed and updated as appropriate: allergies, current medications, past family history, past medical history, past social history, past surgical history and problem list.    PAST MEDICAL HISTORY  Past Medical History:   Diagnosis Date    Abnormal ultrasound cardiogram     F/U DR ABRAMS WAS NEGATIVE    Contact dermatitis     FOLLOWED BY DERMATOLOGY - CHRONIC    Gastroesophageal reflux disease without esophagitis 2020    History of COVID-19 2021    Hx of blood clots     S/P OVARIAN CYSTECTOMY  - RIGHT LEG SUPERFICIAL    Migraine     Palpitations     PACS AND PVC'S    Rectocele     Uterine prolapse      OB History    Para Term  AB Living   4 4 4     4   SAB IAB Ectopic Molar Multiple Live Births           0 4      # Outcome Date GA Lbr Haseeb/2nd Weight Sex Type Anes PTL Lv   4 Term 19 39w2d 01:47 / 00:29 3941 g (8 lb 11 oz) M Vag-Spont EPI N BOOGIE      Birth Comments: scale1   3 Term 16 39w3d 04:05 / 01:49 3643 g (8 lb 0.5 oz) F Vag-Spont EPI N BOOGIE   2 Term 09/10/14 38w3d   M Vag-Spont EPI  N BOOGIE   1 Term 10/11/12 40w0d   M Vag-Spont EPI N BOOGIE     Past Surgical History:   Procedure Laterality Date    ANTERIOR AND POSTERIOR VAGINAL REPAIR N/A 11/29/2023    Procedure: POSTERIOR VAGINAL REPAIR;  Surgeon: Juan Ramon Dias MD;  Location: [facility] MAIN OR;  Service: Obstetrics/Gynecology;  Laterality: N/A;    DIAGNOSTIC LAPAROSCOPY Left 05/21/2018    Procedure: DIAGNOSTIC LAPAROSCOPY/LT.OVARIAN CYSTECTOMY;  Surgeon: Juan Ramon Dias MD;  Location: [facility] MAIN OR;  Service: Obstetrics/Gynecology    SHOULDER ARTHROSCOPY Right 7/20/2022    Procedure: RIGHT SHOULDER ARTHROSCOPY WITH CAPSULAR AND LABRAL REPAIR;  Surgeon: Kristian Lang MD;  [facility];  Service: Orthopedics;  Laterality: Right;    TOTAL LAPAROSCOPIC HYSTERECTOMY N/A 11/29/2023    Procedure: TOTAL LAPAROSCOPIC HYSTERECTOMY;  Surgeon: Juan Ramon Dias MD;  Location: [facility] MAIN OR;  Service: Obstetrics/Gynecology;  Laterality: N/A;    TUBAL COAGULATION LAPAROSCOPIC Bilateral 05/02/2019    Procedure: POSTPARTUM TUBAL LIGATION;  Surgeon: Juan Ramon Dias MD;  Location: [facility] LABOR DELIVERY;  Service: Obstetrics/Gynecology    WISDOM TOOTH EXTRACTION       Family History   Problem Relation Age of Onset    Hypertension Father     Heart disease Paternal Uncle     Stroke Maternal Grandmother     Atrial fibrillation Maternal Grandmother     Thyroid nodules Maternal Grandmother     Aneurysm Maternal Grandfather     Hypertension Paternal Grandmother     Malig Hyperthermia Neg Hx     Breast cancer Neg Hx     Colon cancer Neg Hx      Social History     Tobacco Use   Smoking Status Never    Passive exposure: Never   Smokeless Tobacco Never       Current Outpatient Medications:     Atogepant (Qulipta) 60 MG tablet, Take  by mouth., Disp: , Rfl:     ketorolac (TORADOL) 30 MG/ML injection, Inject 1 mL into the appropriate muscle as directed by prescriber Daily As Needed (migraine)., Disp: 1 mL, Rfl: 1    NON FORMULARY, UBRELVY SAMPLES, Disp: , Rfl:      "ondansetron (Zofran) 4 MG tablet, Take 1 tablet by mouth Every 8 (Eight) Hours As Needed for Nausea or Vomiting., Disp: 20 tablet, Rfl: 0    promethazine (PHENERGAN) 12.5 MG tablet, Take 1 tablet by mouth Every 6 (Six) Hours As Needed for Nausea or Vomiting., Disp: 20 tablet, Rfl: 0    SUMAtriptan (IMITREX) 50 MG tablet, Take 1 tablet by mouth 1 (One) Time As Needed for Migraine. May repeat 1 tablet in 2 hours if headache not relieved. Max of 2 doses in 24 hours., Disp: 9 tablet, Rfl: 3    Syringe/Needle, Disp, (EasyPoint Needle/Syringe) 23G X 1\" 3 ML misc, To be used to administer Toradol 30 mg/mL medication., Disp: 1 each, Rfl: 1    ubrogepant (Ubrelvy) 100 MG tablet, Take 1 tablet by mouth 1 (One) Time As Needed (for migraine headache). May repeat dose in two hours if needed., Disp: 48 tablet, Rfl: 3    nitrofurantoin, macrocrystal-monohydrate, (Macrobid) 100 MG capsule, Take 1 capsule by mouth 2 (Two) Times a Day. (Patient not taking: Reported on 12/18/2024), Disp: 14 capsule, Rfl: 0  Immunization History   Administered Date(s) Administered    COVID-19 (PFIZER) Purple Cap Monovalent 08/17/2021, 09/07/2021    Tdap 03/01/2019       Review of Systems       Except as outlined in history of physical illness, patient denies any changes in her GYN, , GI systems. All other systems reviewed are negative.    Objective   Physical Exam   Alert and oriented, respirations unlabored, heart regular rate and rhythm   Pelvic see ultrasound      Assessment & Plan   Diagnoses and all orders for this visit:    1. History of UTI (Primary)  -     POC Urinalysis Dipstick    2. Follicular cyst of right ovary      Seen previously on CT scan, resolved  3. Dermatitis      Positive MELITON has follow-up visit with rheumatology  4. Follicular cyst of left ovary       Benign in appearance less than 2 cm               Orders Placed This Encounter   Procedures    POC Urinalysis Dipstick     Order Specific Question:   Release to patient     " Answer:   Routine Release [[id number]]           EMR Dragon/ Transcription disclaimer:  Much of the encounter note is an electronic transcription/translation of spoken language to printed text. The electronic translation of spoken language may permit erroneous, or at times, nonessential words or phrases to be inadvertently transcribes; Although i have reviewed the note for such errors, some may still exist.

## 2025-01-03 ENCOUNTER — OFFICE VISIT (OUTPATIENT)
Dept: OBSTETRICS AND GYNECOLOGY | Age: 40
End: 2025-01-03

## 2025-01-03 VITALS
HEIGHT: 66 IN | WEIGHT: 153 LBS | DIASTOLIC BLOOD PRESSURE: 64 MMHG | SYSTOLIC BLOOD PRESSURE: 108 MMHG | BODY MASS INDEX: 24.59 KG/M2

## 2025-01-03 DIAGNOSIS — N89.8 VAGINAL PRURITUS: ICD-10-CM

## 2025-01-03 DIAGNOSIS — R35.0 URINARY FREQUENCY: ICD-10-CM

## 2025-01-03 DIAGNOSIS — R30.0 DYSURIA: Primary | ICD-10-CM

## 2025-01-03 LAB
BILIRUB BLD-MCNC: NEGATIVE MG/DL
CLARITY, POC: CLEAR
COLOR UR: YELLOW
GLUCOSE UR STRIP-MCNC: NEGATIVE MG/DL
KETONES UR QL: NEGATIVE
LEUKOCYTE EST, POC: NEGATIVE
NITRITE UR-MCNC: NEGATIVE MG/ML
PH UR: 6.5 [PH] (ref 5–8)
PROT UR STRIP-MCNC: NEGATIVE MG/DL
RBC # UR STRIP: NEGATIVE /UL
SP GR UR: 1.02 (ref 1–1.03)
UROBILINOGEN UR QL: NORMAL

## 2025-01-03 RX ORDER — CLOBETASOL PROPIONATE 0.5 MG/G
1 OINTMENT TOPICAL 2 TIMES DAILY
Qty: 60 G | Refills: 1 | Status: SHIPPED | OUTPATIENT
Start: 2025-01-03

## 2025-01-03 NOTE — PROGRESS NOTES
"Subjective     Chief Complaint   Patient presents with    Gynecologic Exam     Vaginal itching, possible UTI, urgency and burning         Stephanie Cobb is a 39 y.o.  whose LMP is Patient's last menstrual period was 2023 (approximate).     Pt presents today with chief complaint of pretty severe vaginal itching, worse at night  She also notes dysuria and frequency  She denies abnormal discharge  She is currently seeing rheumatology and possible dx with lupus  She has other skin rashes that itch  Taking a daily allegra      No Additional Complaints Reported    The following portions of the patient's history were reviewed and updated as appropriate:vital signs, allergies, current medications, past medical history, past social history, past surgical history, and problem list      Review of Systems   Pertinent items are noted in HPI.     Objective      /64   Ht 167.6 cm (66\")   Wt 69.4 kg (153 lb)   LMP 2023 (Approximate)   BMI 24.69 kg/m²     Physical Exam    General:   alert and no distress   Heart: Not performed today   Lungs: Not performed today.   Breast: Not performed today   Neck: na   Abdomen: {Not performed today   CVA: Not performed today   Pelvis: External genitalia: erosions and erythema to bilateral labia majora. There is some white/pale areas to posterior vagina.   Urinary system: urethral meatus normal  Vaginal: normal mucosa without prolapse or lesions, normal without tenderness, induration or masses, and normal rugae   Extremities: Not performed today   Neurologic: negative   Psychiatric: Normal affect, judgement, and mood       Lab Review   Labs: UA     Imaging   No data reviewed    Assessment & Plan     ASSESSMENT  1. Dysuria    2. Urinary frequency    3. Vaginal pruritus        PLAN  1.   Orders Placed This Encounter   Procedures    NuSwab BV & Candida - Swab, Vagina    POC Urinalysis Dipstick       2. Medications prescribed this encounter:        New Medications Ordered " This Visit   Medications    clobetasol (TEMOVATE) 0.05 % ointment     Sig: Apply 1 Application topically to the appropriate area as directed 2 (Two) Times a Day.     Dispense:  60 g     Refill:  1       3. UA negative. Swab done to r/o BV and yeast but exam and symptoms c/w with lichen sclerosis. Will do clobetasol bid for 2 months and then decrease to twice weekly thereafter. Info given on condition.    Follow up: 2 month(s)    KAHLIL Bueno  1/3/2025

## 2025-01-05 LAB
A VAGINAE DNA VAG QL NAA+PROBE: NORMAL SCORE
BVAB2 DNA VAG QL NAA+PROBE: NORMAL SCORE
C ALBICANS DNA VAG QL NAA+PROBE: NEGATIVE
C GLABRATA DNA VAG QL NAA+PROBE: NEGATIVE
MEGA1 DNA VAG QL NAA+PROBE: NORMAL SCORE

## 2025-01-08 ENCOUNTER — OFFICE VISIT (OUTPATIENT)
Age: 40
End: 2025-01-08
Payer: MEDICAID

## 2025-01-08 ENCOUNTER — LAB (OUTPATIENT)
Facility: HOSPITAL | Age: 40
End: 2025-01-08
Payer: MEDICAID

## 2025-01-08 VITALS
HEIGHT: 66 IN | TEMPERATURE: 98.2 F | WEIGHT: 155.6 LBS | SYSTOLIC BLOOD PRESSURE: 116 MMHG | OXYGEN SATURATION: 100 % | BODY MASS INDEX: 25.01 KG/M2 | DIASTOLIC BLOOD PRESSURE: 70 MMHG | HEART RATE: 78 BPM

## 2025-01-08 DIAGNOSIS — R76.8 POSITIVE ANA (ANTINUCLEAR ANTIBODY): Primary | ICD-10-CM

## 2025-01-08 DIAGNOSIS — R21 RASH: ICD-10-CM

## 2025-01-08 DIAGNOSIS — M35.7 BENIGN JOINT HYPERMOBILITY SYNDROME: ICD-10-CM

## 2025-01-08 LAB
BILIRUB UR QL STRIP: NEGATIVE
C3 SERPL-MCNC: 140 MG/DL (ref 82–167)
C4 SERPL-MCNC: 24 MG/DL (ref 14–44)
CK SERPL-CCNC: 55 U/L (ref 20–180)
CLARITY UR: CLEAR
COLOR UR: YELLOW
CREAT UR-MCNC: 33.8 MG/DL
DAT POLY-SP REAG RBC QL: NEGATIVE
GLUCOSE UR STRIP-MCNC: NEGATIVE MG/DL
HGB UR QL STRIP.AUTO: NEGATIVE
KETONES UR QL STRIP: NEGATIVE
LEUKOCYTE ESTERASE UR QL STRIP.AUTO: NEGATIVE
NITRITE UR QL STRIP: NEGATIVE
PH UR STRIP.AUTO: 7.5 [PH] (ref 5–8)
PROT ?TM UR-MCNC: <4 MG/DL
PROT UR QL STRIP: NEGATIVE
PROT/CREAT UR: NORMAL MG/G{CREAT}
SP GR UR STRIP: 1.01 (ref 1–1.03)
UROBILINOGEN UR QL STRIP: NORMAL

## 2025-01-08 PROCEDURE — 86037 ANCA TITER EACH ANTIBODY: CPT | Performed by: STUDENT IN AN ORGANIZED HEALTH CARE EDUCATION/TRAINING PROGRAM

## 2025-01-08 PROCEDURE — 86200 CCP ANTIBODY: CPT | Performed by: STUDENT IN AN ORGANIZED HEALTH CARE EDUCATION/TRAINING PROGRAM

## 2025-01-08 PROCEDURE — G2212 PROLONG OUTPT/OFFICE VIS: HCPCS | Performed by: STUDENT IN AN ORGANIZED HEALTH CARE EDUCATION/TRAINING PROGRAM

## 2025-01-08 PROCEDURE — 86235 NUCLEAR ANTIGEN ANTIBODY: CPT | Performed by: STUDENT IN AN ORGANIZED HEALTH CARE EDUCATION/TRAINING PROGRAM

## 2025-01-08 PROCEDURE — 36415 COLL VENOUS BLD VENIPUNCTURE: CPT | Performed by: STUDENT IN AN ORGANIZED HEALTH CARE EDUCATION/TRAINING PROGRAM

## 2025-01-08 PROCEDURE — 83516 IMMUNOASSAY NONANTIBODY: CPT | Performed by: STUDENT IN AN ORGANIZED HEALTH CARE EDUCATION/TRAINING PROGRAM

## 2025-01-08 PROCEDURE — 86160 COMPLEMENT ANTIGEN: CPT | Performed by: STUDENT IN AN ORGANIZED HEALTH CARE EDUCATION/TRAINING PROGRAM

## 2025-01-08 PROCEDURE — 85705 THROMBOPLASTIN INHIBITION: CPT | Performed by: STUDENT IN AN ORGANIZED HEALTH CARE EDUCATION/TRAINING PROGRAM

## 2025-01-08 PROCEDURE — 1160F RVW MEDS BY RX/DR IN RCRD: CPT | Performed by: STUDENT IN AN ORGANIZED HEALTH CARE EDUCATION/TRAINING PROGRAM

## 2025-01-08 PROCEDURE — 84156 ASSAY OF PROTEIN URINE: CPT | Performed by: STUDENT IN AN ORGANIZED HEALTH CARE EDUCATION/TRAINING PROGRAM

## 2025-01-08 PROCEDURE — 82570 ASSAY OF URINE CREATININE: CPT | Performed by: STUDENT IN AN ORGANIZED HEALTH CARE EDUCATION/TRAINING PROGRAM

## 2025-01-08 PROCEDURE — 86146 BETA-2 GLYCOPROTEIN ANTIBODY: CPT | Performed by: STUDENT IN AN ORGANIZED HEALTH CARE EDUCATION/TRAINING PROGRAM

## 2025-01-08 PROCEDURE — 85670 THROMBIN TIME PLASMA: CPT | Performed by: STUDENT IN AN ORGANIZED HEALTH CARE EDUCATION/TRAINING PROGRAM

## 2025-01-08 PROCEDURE — 85613 RUSSELL VIPER VENOM DILUTED: CPT | Performed by: STUDENT IN AN ORGANIZED HEALTH CARE EDUCATION/TRAINING PROGRAM

## 2025-01-08 PROCEDURE — 86255 FLUORESCENT ANTIBODY SCREEN: CPT | Performed by: STUDENT IN AN ORGANIZED HEALTH CARE EDUCATION/TRAINING PROGRAM

## 2025-01-08 PROCEDURE — 86880 COOMBS TEST DIRECT: CPT | Performed by: STUDENT IN AN ORGANIZED HEALTH CARE EDUCATION/TRAINING PROGRAM

## 2025-01-08 PROCEDURE — 86147 CARDIOLIPIN ANTIBODY EA IG: CPT | Performed by: STUDENT IN AN ORGANIZED HEALTH CARE EDUCATION/TRAINING PROGRAM

## 2025-01-08 PROCEDURE — 86038 ANTINUCLEAR ANTIBODIES: CPT | Performed by: STUDENT IN AN ORGANIZED HEALTH CARE EDUCATION/TRAINING PROGRAM

## 2025-01-08 PROCEDURE — 81003 URINALYSIS AUTO W/O SCOPE: CPT | Performed by: STUDENT IN AN ORGANIZED HEALTH CARE EDUCATION/TRAINING PROGRAM

## 2025-01-08 PROCEDURE — 99205 OFFICE O/P NEW HI 60 MIN: CPT | Performed by: STUDENT IN AN ORGANIZED HEALTH CARE EDUCATION/TRAINING PROGRAM

## 2025-01-08 PROCEDURE — 85732 THROMBOPLASTIN TIME PARTIAL: CPT | Performed by: STUDENT IN AN ORGANIZED HEALTH CARE EDUCATION/TRAINING PROGRAM

## 2025-01-08 PROCEDURE — 82550 ASSAY OF CK (CPK): CPT | Performed by: STUDENT IN AN ORGANIZED HEALTH CARE EDUCATION/TRAINING PROGRAM

## 2025-01-08 PROCEDURE — 1159F MED LIST DOCD IN RCRD: CPT | Performed by: STUDENT IN AN ORGANIZED HEALTH CARE EDUCATION/TRAINING PROGRAM

## 2025-01-08 NOTE — PROGRESS NOTES
RHEUMATOLOGY NEW PATIENT VISIT  2025  Patient Name: Stephanie Cobb : 1985 Medical Record: [id number]  PCP: Joana Ray MD  Referring provider: Joana Ray    REASON FOR CONSULTATION  Positive MELITON, recurrent rash, fatigue    History of Present Illness  Stephanie Cobb is a 39 y.o. female who presents for evaluation of skin rash, fatigue and positive MELITON.    She is accompanied by her  to the visit.    Rheumatological history:  Patient does not have any personal or family history of autoimmune or rheumatological condition.    She had autoimmune screen done by her primary provider due to this recurrent skin condition, fatigue and joint pains that has been progressive lately.  An MELITON direct test was positive but complements were within normal.  No SHYAM panel was obtained.    Regarding the rash:  She began experiencing eczema at the age of 1 this was intermittent and self-limiting. In her late 20s and early 30s, she began experiencing flare-ups of similar rash on her entire arm, chest, and back, which she attributed to hormonal changes and dehydration. These symptoms would subside after 3 to 4 months without intervention. In her mid-30s, the flare-ups persisted, prompting her to consult an allergist and dermatologist. She was prescribed corticosteroid cream, which initially alleviated the flare-ups, but they would recur. Two years ago, she had a severe flare-up that disrupted her sleep, causing severe itching. Allergy testing revealed no food allergies, but a 10-day chemical test revealed four chemical allergies found in  shampoos, soap, and detergents that she was using.  Since 2024 she has had at least 2 flares despite avoiding exposures to known triggers. She had a skin biopsy 15 years ago, which did not diagnose lupus.  Some events that she associates with relief has been during receipt of IV fluid for surgical procedures and recently, when she had a stomach bug that caused her to  be nauseous and vomit.  She does not understand this.  Currently the lesions are clearing and better than what it was a few weeks ago.    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  She gets about 7 to 8 hours of sleep, wakes up refreshed, denies morning stiffness both does not feel her real self especially during flares of her skin condition.    Regarding her joint pains:  She has noticed increasing diffuse joint pains, involving her neck, shoulders, lower back, hand joints,ankles and feet.  She has been told by her chiropractor that she is hypermobile.  She has intermittent morning stiffness that last about an hour but gets better with movement, but denies joint swelling.  She has a history of shoulder dislocation and subluxation, requiring surgery.    Other associated symptoms:    She occasionally experiences dizziness upon standing, and palpitations. She has had an abnormal EKG and echocardiogram, but her cardiologist reassured her that she is fine. Her blood pressure can rise to 160/90, which may be associated with migraines, and can drop within 10 minutes. She has had PACs and PVCs, but was told they were not severe enough to require medication.       Other review of rheumatology system history:  No oral, nasal or genital ulcers.  She denies difficulty swallowing or Raynaud's  She has mild dry mouth and occasional dry eyes.  She has started noticing hair loss but not significant.  She denies serositis, shortness of breath, or kidney issues  She has 4 kids and no history of miscarriages, pulmonary embolism.  She has a history of a superficial blood clot in her leg post-surgery, treated with cold compresses, denies seizures or strokelike or memory issues.    SOCIAL HISTORY  She drinks alcohol 3 times a year with her son on a date, but not regularly. She does not use recreational drugs.She reports no  "anxiety or depression. She exercises 2 to 3 times a week, walking a brisk 2 to 3 miles in the mornings, and lifting weights once or twice a week    FAMILY HISTORY  She denies any family history of autoimmune diseases like scleroderma, lupus, sarcoidosis, vasculitis, rheumatoid arthritis, or gout. Her side of the family does not go to the doctor.    Results  11/22/2024  SCL 70 negative  Celiac panel negative  CRP, ESR within normal  MELITON direct positive  RF negative  Complements within normal  Historical:  Negative thyroid antibodiesLaboratory Studies  MELITON was positive. Complements for lupus were normal.      Current Outpatient Medications:     Atogepant (Qulipta) 60 MG tablet, Take  by mouth., Disp: , Rfl:     clobetasol (TEMOVATE) 0.05 % ointment, Apply 1 Application topically to the appropriate area as directed 2 (Two) Times a Day., Disp: 60 g, Rfl: 1    ketorolac (TORADOL) 30 MG/ML injection, Inject 1 mL into the appropriate muscle as directed by prescriber Daily As Needed (migraine)., Disp: 1 mL, Rfl: 1    ondansetron (Zofran) 4 MG tablet, Take 1 tablet by mouth Every 8 (Eight) Hours As Needed for Nausea or Vomiting., Disp: 20 tablet, Rfl: 0    promethazine (PHENERGAN) 12.5 MG tablet, Take 1 tablet by mouth Every 6 (Six) Hours As Needed for Nausea or Vomiting., Disp: 20 tablet, Rfl: 0    Syringe/Needle, Disp, (EasyPoint Needle/Syringe) 23G X 1\" 3 ML misc, To be used to administer Toradol 30 mg/mL medication., Disp: 1 each, Rfl: 1    ubrogepant (Ubrelvy) 100 MG tablet, Take 1 tablet by mouth 1 (One) Time As Needed (for migraine headache). May repeat dose in two hours if needed., Disp: 48 tablet, Rfl: 3    NON FORMULARY, UBRELVY SAMPLES, Disp: , Rfl:      There are no discontinued medications.     Past Medical History:   Diagnosis Date    Abnormal ultrasound cardiogram     F/U DR ABRAMS WAS NEGATIVE    Contact dermatitis     FOLLOWED BY DERMATOLOGY - CHRONIC    Gastroesophageal reflux disease without esophagitis " 02/19/2020    History of COVID-19 04/2021    Hx of blood clots 2018    S/P OVARIAN CYSTECTOMY  - RIGHT LEG SUPERFICIAL    Migraine     Palpitations     PACS AND PVC'S    Rectocele     Uterine prolapse      Social History     Socioeconomic History    Marital status:    Tobacco Use    Smoking status: Never     Passive exposure: Never    Smokeless tobacco: Never   Vaping Use    Vaping status: Never Used   Substance and Sexual Activity    Alcohol use: No    Drug use: No    Sexual activity: Yes     Partners: Male     Birth control/protection: Condom, Tubal ligation     Past Surgical History:   Procedure Laterality Date    ANTERIOR AND POSTERIOR VAGINAL REPAIR N/A 11/29/2023    Procedure: POSTERIOR VAGINAL REPAIR;  Surgeon: Juan Ramon Dias MD;  Location: [facility] OR;  Service: Obstetrics/Gynecology;  Laterality: N/A;    DIAGNOSTIC LAPAROSCOPY Left 05/21/2018    Procedure: DIAGNOSTIC LAPAROSCOPY/LT.OVARIAN CYSTECTOMY;  Surgeon: Juan Ramon Dias MD;  Location: [facility] MAIN OR;  Service: Obstetrics/Gynecology    SHOULDER ARTHROSCOPY Right 7/20/2022    Procedure: RIGHT SHOULDER ARTHROSCOPY WITH CAPSULAR AND LABRAL REPAIR;  Surgeon: Kristian Lang MD;  Location: [facility] OR OSC;  Service: Orthopedics;  Laterality: Right;    TOTAL LAPAROSCOPIC HYSTERECTOMY N/A 11/29/2023    Procedure: TOTAL LAPAROSCOPIC HYSTERECTOMY;  Surgeon: Juan Ramon Dias MD;  Location: [facility] MAIN OR;  Service: Obstetrics/Gynecology;  Laterality: N/A;    TUBAL COAGULATION LAPAROSCOPIC Bilateral 05/02/2019    Procedure: POSTPARTUM TUBAL LIGATION;  Surgeon: Juan Ramon Dias MD;  Location: [facility] LABOR DELIVERY;  Service: Obstetrics/Gynecology    WISDOM TOOTH EXTRACTION         Allergies   Allergen Reactions    Latex Rash     Tested positive on allergy test    Oxycodone Nausea And Vomiting and Other (See Comments)     HEAD SPINNING     Physical Exam      Vitals:    01/08/25 1347   BP: 116/70   BP Location: Left arm   Patient Position: Sitting   Cuff Size:  "Adult   Pulse: 78   Temp: 98.2 °F (36.8 °C)   TempSrc: Oral   SpO2: 100%   Weight: 70.6 kg (155 lb 9.6 oz)   Height: 167.6 cm (65.98\")     GENERAL: The patient appeared to be in no distress. AXOX3.  SKIN: patient appears flushed in the face  and exposed skin with areas of slightly raised red skin papules in bilateral forearms. No psoriatic or discoid rash was noted.    HEENT: Head was atraumatic and normocephalic. The patient was anicteric. Pupils were equally reactive to light. Ears: There were no lesions. Nose: No lesions were noted. Throat: There was no thrush, no exudate, no erythema.  NECK: Supple. There was no JVD. No bruit was heard over the carotids.  HEART: S1 and S2, regular, no murmurs, rubs, or gallops  LUNGS: Air entry was good, clear to auscultation bilaterally. No wheezes/ rhonchi/ rales.  ABDOMEN: soft and nontender. Bowel sounds were present. No organomegaly.  EXT: No clubbing, cyanosis, edema.  NEUROLOGICAL: There was no focal deficit. Cranial nerves II through XII were intact. Motor strength 5/5.  PSYCHIATRIC: no homicidal/ suicidal ideations.    Musculoskeletal: patient is hypermobile.  Gait: Ambulates without assistance, normal gait  Spine: Full ROM  Upper Extremities  Shoulder: Normal. Full ROM, no crepitus  Elbow: Normal. Full ROM, no synovitis, no deformity  Wrist: Normal. Full ROM, no synovitis, no deformity, no ulnar deviation  Fingers: Normal. No sclerodactyly, no active raynaud's, no ulcers  MCPs: Normal. no synovitis, no deformity  PIPs: Normal. no synovitis, no deformity  DIPs: Normal. no synovitis, no deformity  Nails: Normal. No pitting, no telangiectasias.  Lower Extremities  Hip: Normal. Full ROM, no tenderness to palpation  Knee: Normal. Full ROM, no erythema/swelling/laxity/crepitus.  Ankle: Normal. Full ROM, no swelling or erythema  MTPs: Normal. No swelling or erythema    Patient has positive Beighton score for assessment of hypermobility:Total score- 8-9/9  Patient can oppose " thumb to touch forearm- 2  Patient can pull little finger back beyond 90 degrees- 2  Patient can bend elbow backwards about 10 degrees- 2  Patient can bend knee backwards beyond 10 degrees- 1  Patient can lay hands flat on the floor while keeping knee straight-1      LABS AND IMAGING ll laboratory data was reviewed and relevant values are noted as below.    See HPI.    Results from last 7 days   Lab Units 01/08/25  1546 01/03/25  0954   COLOR UA  Yellow Yellow   CLARITY UA  Clear Clear   PROTEIN UA  Negative  --    PH, URINE  7.5 6.5   GLUCOSE UA  Negative  --    BLOOD UA  Negative  --    LEUKOCYTES UA  Negative Negative   NITRITE UA   --  Negative   BILIRUBIN UA  Negative Negative   UROBILINOGEN UA  0.2 E.U./dL Normal     Assessment & Plan  Stephanie Cobb is a 39 y.o. female who is being seen for evaluation of recurrent rash, joint pain, fatigue and recent positive MELITON test.    Patient's history is notable for significant recurrent erythematous rash, hypermobility with symptoms of autonomic manifestations.  She had an eczematous rash as a child which has progressed and become more recurrent in adulthood.  She indicates a prior biopsy about 15 years ago that did not show evidence of cutaneous lupus.  Past evaluation by allergy/immunology was not very revealing except for few chemical allergens that she avoided, with transient relief, but her symptoms returned.  She has not followed with dermatology recently and had not had a repeat skin biopsy with recent flares.  She however has a pending appointment with dermatology.    She indicates an active lifestyle and cares for 4 children, but has noticed increasing fatigue and loss of energy despite having adequate sleep and waking up refreshed.    She has been told in the past that her joints were hypermobile but did not think too much of it.  She also has fluctuating hemodynamic status with periods of hypertension, hypotension, tachycardia and with known findings of PACs  and PVCs on EKG.  Other autonomic manifestations include, skin flushing with hot showers, constipation alternating with diarrhea sometimes, recurrent cystitis without evidence of infection.    She recently had a positive MELITON testing done by the direct method, with associated normal complements.  SHYAM panel was not obtained.    Today's evaluation does not show clinical features usually associated with lupus or other autoimmune disease such as oral or nasal ulceration, vasculitic rash, serositis, eye inflammation, typical photosensitive malar rash, reduced salivary pool, reduced muscle strength or any notable neurological findings.  Exam was pertinent for demonstration of residual skin erythema and facial flushing and notable hypermobility.  Her Beighton score was 8-9 out of 9, which is positive.    While we do not have a unifying diagnosis for patients presentation, we will need to perform further testing and employ a multidisciplinary approach to patient's evaluation.       Regarding her recurrent erythematous, urticarial skin presentation:  Differentials include-an underlying autoimmune cause-to exclude lupus, hypocomplementemic urticarial vasculitis syndrome (HUVS), autonomic cutaneous manifestation of connective tissue disorder such as Liat-Danlos, allergy/immune related hypersensitivity reaction or immunodeficiency    Regarding her increasing fatigue: Considerations also include excluding an autoimmune disease, illness or stress-related causes    Regarding her joint symptoms: This may be related to observed joint hypermobility and other degenerative MSK changes    I have discussed with both patient and  about thoughts on this consult and the following recommendations were agreed upon:    - Will obtain comprehensive autoimmune testing for lupus, and other rheumatological conditions, serology for HUVS and other vasculitides    -Follow-up reports of upcoming dermatology visit and possible skin biopsy    -  Request primary care provider to obtain further genetic testing to investigate for Liat-Danlos.  The confirmation of the latter will allow for close monitoring for associated complications of vascular EDS.    - Consider engaging allergy and immunology specialist at some point to revisit evaluation for allergy/immune related hypersensitivity reaction or immunodeficiency    - Patient has been counseled on conservative symptomatic management for her benign joint hypermobility-including pain control, avoidance of overextension of her joints, physical therapy, cognitive behavioral therapy, dietary and lifestyle modification.    - She has already seen cardiology and has had echo and EKG evaluation.  Patient has been made aware of the possibility of related postural orthostatic tachycardia syndrome (POTS) causing some of her symptoms and the fluctuations in her hemodynamic status she has been experiencing.  She will continue to follow cardiology as needed.    - There is no indication for treatment with DMARDs or immunosuppressive at this time.    Follow-up  Return in 2 months for follow-up.    Diagnoses and all orders for this visit:    1. Positive MELITON (antinuclear antibody) (Primary)  -     Antihistone Antibodies  -     Protein / Creatinine Ratio, Urine - Urine, Clean Catch  -     Urinalysis With Microscopic If Indicated (No Culture) - Urine, Clean Catch  -     MELITON by IFA, Reflex to Titer and Pattern  -     Anticardiolipin Antibody, IgG / M, Qn  -     Lupus Anticoagulant  -     Beta-2 Glycoprotein Antibodies  -     C3 Complement  -     C4 Complement  -     dsDNA Antibody by IFA, Crithidia luciliae, with Reflex to Titer  -     SHYAM Antibody Panel  -     Direct Antiglobulin Test (Direct Richard)  -     Cyclic Citrul Peptide Antibody, IgG / IgA  -     ANCA Panel  -     CK  -     Anti-C1Q Ab, IgG (RDL)  -     Complement C1q, Quantitative    2. Rash  -     Antihistone Antibodies  -     Protein / Creatinine Ratio, Urine -  Urine, Clean Catch  -     Urinalysis With Microscopic If Indicated (No Culture) - Urine, Clean Catch  -     MELITON by IFA, Reflex to Titer and Pattern  -     Anticardiolipin Antibody, IgG / M, Qn  -     Lupus Anticoagulant  -     Beta-2 Glycoprotein Antibodies  -     C3 Complement  -     C4 Complement  -     dsDNA Antibody by IFA, Crithidia luciliae, with Reflex to Titer  -     SHYAM Antibody Panel  -     Direct Antiglobulin Test (Direct Richard)  -     Cyclic Citrul Peptide Antibody, IgG / IgA  -     ANCA Panel  -     CK  -     Anti-C1Q Ab, IgG (RDL)  -     Complement C1q, Quantitative    3. Benign joint hypermobility syndrome  -     Antihistone Antibodies  -     Protein / Creatinine Ratio, Urine - Urine, Clean Catch  -     Urinalysis With Microscopic If Indicated (No Culture) - Urine, Clean Catch  -     MELITON by IFA, Reflex to Titer and Pattern  -     Anticardiolipin Antibody, IgG / M, Qn  -     Lupus Anticoagulant  -     Beta-2 Glycoprotein Antibodies  -     C3 Complement  -     C4 Complement  -     dsDNA Antibody by IFA, Crithidia luciliae, with Reflex to Titer  -     SHYAM Antibody Panel  -     Direct Antiglobulin Test (Direct Richard)  -     Cyclic Citrul Peptide Antibody, IgG / IgA  -     ANCA Panel  -     CK      Patient or patient representative verbalized consent for the use of Ambient Listening during the visit with  Carlos Ferguson MD for chart documentation. 1/8/2025  20:25 EST    I spent 80 minutes caring for Stephanie on this date of service. This time includes time spent by me in the following activities:preparing for the visit, reviewing tests, obtaining and/or reviewing a separately obtained history, performing a medically appropriate examination and/or evaluation , counseling and educating the patient/family/caregiver, ordering medications, tests, or procedures, referring and communicating with other health care professionals , documenting information in the medical record, independently interpreting results  and communicating that information with the patient/family/caregiver, and care coordination

## 2025-01-09 LAB
ANA SER QL IF: POSITIVE
ANA SPECKLED TITR SER: ABNORMAL {TITER}
APTT SCREEN TO CONFIRM RATIO: 1 RATIO (ref 0–1.34)
B2 GLYCOPROT1 IGA SER-ACNC: <9 GPI IGA UNITS (ref 0–25)
B2 GLYCOPROT1 IGG SER-ACNC: <9 GPI IGG UNITS (ref 0–20)
B2 GLYCOPROT1 IGM SER-ACNC: <9 GPI IGM UNITS (ref 0–32)
CARDIOLIPIN IGG SER IA-ACNC: <9 GPL U/ML (ref 0–14)
CARDIOLIPIN IGM SER IA-ACNC: 11 MPL U/ML (ref 0–12)
CCP IGA+IGG SERPL IA-ACNC: 0 UNITS (ref 0–19)
CONFIRM APTT/NORMAL: 35.6 SEC (ref 0–47.6)
DSDNA AB SER QL CLIF: NEGATIVE
ENA RNP AB SER-ACNC: 1 AI (ref 0–0.9)
ENA SCL70 AB SER-ACNC: <0.2 AI (ref 0–0.9)
ENA SM AB SER-ACNC: <0.2 AI (ref 0–0.9)
ENA SS-A AB SER-ACNC: <0.2 AI (ref 0–0.9)
ENA SS-B AB SER-ACNC: <0.2 AI (ref 0–0.9)
LA 2 SCREEN W REFLEX-IMP: NORMAL
Lab: ABNORMAL
SCREEN APTT: 31.7 SEC (ref 0–43.5)
SCREEN DRVVT: 30.8 SEC (ref 0–47)
THROMBIN TIME: 17.1 SEC (ref 0–23)

## 2025-01-09 NOTE — PATIENT INSTRUCTIONS
Thank you for your new patient visit today with rheumatology  You were evaluated for a positive MELITON test which was obtained because of ongoing joint pain skin rash and fatigue.  We discussed that she will need repeat of your MELITON test with a more standard methods and will also obtain additional autoimmune workup to exclude a related autoimmune cause for your presentation  We encouraged follow-up with dermatology for possible biopsy of the lesions  Your exam showed joint hypermobility and without this might be contributing to some of his symptoms, especially joint pains and autonomic symptoms which can include erythematous skin rashes.  You have been counseled on other lifestyle changes, continued exercise and avoidance of activities that requires hyperextension of the joints.  We recommended discussion with your primary care provider to obtain a genetic testing for Liat-Danlos syndrome which can be related with joint hypermobility.  Will decide on the next steps once results of your test are available  You are scheduled for follow-up in 2 months.  I have included a few patient education material on joint hypermobility and Liat-Danlos syndrome below.  ---------------------------------------------------------------------------------------------------------------------                               Benign hypermobility syndrome (BHS)    Benign hypermobility syndrome (BHS) is a condition where joints easily move beyond the normal range expected for a particular joint. This can lead to joint pain and other symptoms. BHS is often seen in children and young adults and tends to improve with age.  Symptoms:   Joint pain, especially after physical activity   Frequent joint dislocations or sprains   Soft, stretchy skin   Fatigue and muscle pain   Poor coordination and balance  Diagnosis:  BHS is diagnosed based on clinical criteria, including a physical examination to assess joint hypermobility. The Beighton score is  commonly used to measure the extent of joint hypermobility. Other conditions that can cause similar symptoms, such as Liat-Danlos syndrome, need to be ruled out.[1-2]  Management:  The primary goals of managing BHS are to reduce symptoms, prevent joint injuries, and improve quality of life. Management strategies include:  1. Physical Therapy: Tailored exercises to strengthen muscles around the joints, improve proprioception, and enhance joint stability are crucial. Physical therapy can help reduce pain and prevent injuries.[1][4][6]  2. Occupational Therapy: This can assist in recommending ergonomic adjustments at home and work, and provide strategies to manage daily activities without causing joint strain.[1][4]  3. Pain Management: Techniques such as dry needling, behavioral therapies, and complementary therapies can be beneficial. Over-the-counter pain relievers may also be used as needed.[7]  4. Lifestyle Modifications: Patients are encouraged to avoid activities that put excessive strain on the joints. Using supportive devices like braces or splints can help stabilize joints during activities.[1][4]  5. Education and Self-Management: Educating patients about their condition and teaching self-management strategies, such as activity pacing and proper body mechanics, are essential for long-term management.[3][5]  Prognosis:  While BHS can cause significant discomfort and functional limitations, many individuals experience improvement in symptoms with appropriate management. Regular follow-up with healthcare providers is important to monitor the condition and adjust treatment as needed.  For more information or if you have any concerns, please consult your healthcare provider.    ------------------------------------------------------------------------------------------------------------------------                                Liat-Danlos syndrome (EDS)     Liat-Danlos syndrome (EDS) is a group of genetic  connective tissue disorders that affect the skin, joints, and blood vessel walls. There are several types of EDS, each with its own set of symptoms and genetic causes.  Types of EDS:   Hypermobile EDS (hEDS): The most common type, characterized by joint hypermobility, skin that may be soft and stretchy, and chronic pain. It is inherited in an autosomal dominant pattern but does not have a known genetic mutation for diagnosis.[1]   Vascular EDS (vEDS): A rare type that affects blood vessels, leading to a higher risk of arterial rupture, bowel rupture, and uterine rupture during pregnancy. It is caused by mutations in the COL3A1 gene.[2]   Classical EDS (cEDS): Characterized by highly elastic, velvety skin that is prone to bruising and scarring, and joint hypermobility.[7]  Symptoms:   Joint hypermobility: Joints that move beyond the normal range, leading to frequent dislocations and chronic pain.[1]   Skin abnormalities: Soft, stretchy, and fragile skin that bruises easily and heals poorly.[5]   Chronic pain: Persistent pain in muscles and joints, often requiring multidisciplinary management.[10]   Other symptoms: Fatigue, anxiety, gastrointestinal issues, and cardiovascular problems.[1-2][7]  Management:   Physical and Occupational Therapy: Essential for improving joint stability and managing pain. Exercises should be tailored to avoid joint injury.[1][9]   Pain Management: Includes medications, physical therapy, and psychological support. Techniques like cognitive behavioral therapy and low-dose naltrexone may be beneficial.[10][12]   Lifestyle Modifications: Avoiding high-impact activities and using protective gear to prevent injuries.[5]   Regular Monitoring: For vEDS, regular imaging of the vascular system is recommended to monitor for aneurysms and other complications.[2]   Multidisciplinary Care: Involves a team of specialists, including genetic counselors, dermatologists, neurologists, and cardiologists, to  address the various symptoms and complications of EDS.[3][8]  Support and Resources:   Genetic Counseling: Helps patients understand the inheritance patterns and implications of EDS.[11]   Patient Education: Access to well-constructed educational materials and support groups can help patients manage their condition effectively.[6]  EDS is a complex condition that requires comprehensive care and support. Regular follow-ups with healthcare providers and adherence to management plans can significantly improve quality of life for individuals with EDS.

## 2025-01-10 ENCOUNTER — PATIENT ROUNDING (BHMG ONLY) (OUTPATIENT)
Age: 40
End: 2025-01-10
Payer: MEDICAID

## 2025-01-10 NOTE — PROGRESS NOTES
January 10, 2025    Hello, may I speak with Stephanie Cobb?    My name is Beverly Ricketts     I am  with [facility]  [facility]  [address]  [facility] [address] [phone].    Before we get started may I verify your date of birth? 1985    I am calling to officially welcome you to our practice and ask about your recent visit. Is this a good time to talk? yes    Tell me about your visit with us. What things went well?  great! I felt listened to. The doctor was calming and went over my history- It was encouraging,        We're always looking for ways to make our patients' experiences even better. Do you have recommendations on ways we may improve?  no    Overall were you satisfied with your first visit to our practice? yes       I appreciate you taking the time to speak with me today. Is there anything else I can do for you? no      Thank you, and have a great day.

## 2025-01-13 LAB
C-ANCA TITR SER IF: NORMAL TITER
MYELOPEROXIDASE AB SER IA-ACNC: <0.2 UNITS (ref 0–0.9)
P-ANCA ATYPICAL TITR SER IF: NORMAL TITER
P-ANCA TITR SER IF: NORMAL TITER
PROTEINASE3 AB SER IA-ACNC: <0.2 UNITS (ref 0–0.9)

## 2025-01-14 LAB
C1Q AB SER-ACNC: <20 UNITS
HISTONE IGG SER IA-ACNC: 0.6 UNITS (ref 0–0.9)

## 2025-01-17 ENCOUNTER — OFFICE VISIT (OUTPATIENT)
Dept: FAMILY MEDICINE CLINIC | Facility: CLINIC | Age: 40
End: 2025-01-17

## 2025-01-17 VITALS
HEART RATE: 78 BPM | HEIGHT: 59 IN | TEMPERATURE: 97.9 F | SYSTOLIC BLOOD PRESSURE: 110 MMHG | WEIGHT: 154 LBS | BODY MASS INDEX: 31.04 KG/M2 | DIASTOLIC BLOOD PRESSURE: 70 MMHG

## 2025-01-17 DIAGNOSIS — R10.84 GENERALIZED ABDOMINAL PAIN: ICD-10-CM

## 2025-01-17 DIAGNOSIS — L30.9 DERMATITIS: ICD-10-CM

## 2025-01-17 DIAGNOSIS — Z12.31 ENCOUNTER FOR SCREENING MAMMOGRAM FOR MALIGNANT NEOPLASM OF BREAST: ICD-10-CM

## 2025-01-17 DIAGNOSIS — M35.7 HYPERMOBILITY SYNDROME: ICD-10-CM

## 2025-01-17 DIAGNOSIS — Q79.60 EDS (EHLERS-DANLOS SYNDROME): Primary | ICD-10-CM

## 2025-01-17 NOTE — PROGRESS NOTES
"Chief Complaint  Joint Pain (Joint pain and swellings alternate with rash   would like to f/u dr enriquez advise to test for EDS)    Subjective        Stephanie Cobb presents to [facility]  Joint Pain  Pleasant 39-year-old female here to follow-up for multiple concerns that look like possible EDS.  She does have hypermobility, history of dislocated joints, unusual episodes of a rash that is diffuse, intensely pruritic and will last for weeks if not months, she also has episodes of cyclical abdominal pain and feels like intense cramping.  She has a lot of food sensitivities and is very careful with what she eats.  She also has realized that she does have more joint pain but has just gotten so used to it she does not always acknowledge it.  She gets very tight muscles, difficulty with carrying groceries etc.    I referred her to a rheumatologist to see if he thought her symptoms were autoimmune in nature.  It is interesting that her test came back with a positive MELITON in a speckled pattern with positive RNP antibodies which can be a sign of connective tissue disease.    The rheumatologist does think that looking into EDS further would be warranted.    Objective   Vital Signs:  /70   Pulse 78   Temp 97.9 °F (36.6 °C)   Ht 151 cm (59.45\")   Wt 69.9 kg (154 lb)   BMI 30.64 kg/m²   Estimated body mass index is 30.64 kg/m² as calculated from the following:    Height as of this encounter: 151 cm (59.45\").    Weight as of this encounter: 69.9 kg (154 lb).    Physical Exam  Vitals and nursing note reviewed.   Constitutional:       General: She is not in acute distress.     Appearance: She is well-developed.   HENT:      Head: Normocephalic.      Nose: Nose normal.   Eyes:      General: No scleral icterus.  Pulmonary:      Effort: Pulmonary effort is normal. No respiratory distress.   Musculoskeletal:         General: Normal range of motion.   Skin:     General: Skin is warm and dry.      " Findings: No rash.   Neurological:      Mental Status: She is alert and oriented to person, place, and time.   Psychiatric:         Behavior: Behavior normal.         Thought Content: Thought content normal.         Judgment: Judgment normal.        arm span 170 cm, height 151 cm     Result Review :  The following data was reviewed by: Joana Ray MD on 01/17/2025:           Office Visit with Carlos Ferguson MD (01/08/2025)     ALVINA Staining Patterns (01/08/2025 15:56)  SHYAM Antibody Panel (01/08/2025 15:56)  MELITON by IFA, Reflex to Titer and Pattern (01/08/2025 15:56)    Diagnostic Criteria for Hypermobile Liat-Danlos Syndrome (hEDS)      hEDS Overall Criteria Status: incomplete   Criterion 1: met Beighton status: positive Beighton score: 7/9      Criterion 2: met Feature A: positive  Feature C: positive         Criterion 3: incomplete      Criterion 1 - Generalized Joint Hypermobility   Status: met Patient type: pubertal person age 50 and younger   Beighton status: positive Beighton score: 7/9   Spine: yes   Right 5th finger: yes Left 5th finger: yes   Right thumb: yes Left thumb: yes   Elbows measured with goniometer?: no   Right elbow: less than or equal to 10 degrees    Left elbow: less than or equal to 10 degrees      Knees measured with goniometer?: no   Right knee: greater than 10 degrees    Left knee: greater than 10 degrees            Criterion 2   Status: met      Feature A   Status: positive   Score: 9/12   Unusually soft or velvety skin: yes   Mild skin hyperextensibility (positive if greater than 1.5cm): greater than 2 cm   Unexplained striae distensae or rubae: yes (Comment: on the legs during puberty)   Bilateral piezogenic papules of the heel: yes   Recurrent or multiple abdominal hernias: no   Atrophic scarring: no   Pelvic floor, rectal, and/or uterine prolapse: yes   Dental crowding and high or narrow palate: yes   Arachnodactyly: yes   Arm [id number]-mx-ymttrc ratio is greater than or equal to  1.05: yes (Comment: arm span 170 cm, height 151 cm)   Mitral valve prolapse (MVP): no   Aortic root dilatation with Z-score >+2: yes      Feature B   Family member details: Family history unknown, but she thinks it is likely in her mom (hip dysplasia since childhood/ diffuse joint pain since 30s) and grand father ( He had translucent skin and  of an aneurism)       Feature C   Status: positive   Musculoskeletal pain in two or more limbs, recurring daily for at least 3 months: yes   Chronic, widespread pain for at least 3 months: no   Recurrent joint dislocations or romulo joint instability, in the absence of trauma: yes      Criterion 3   Status: incomplete   Does the patient have unusual skin fragility which should prompt consideration of other types of EDS?: yes/likely   Skin fragility comments: Bruises easily and large bruises with small impact.  As a kid her skin was blood and bruised from the knee down.  Lots of tearing and GYN mentioned her perineum was extremely/ unusually torn,.    Does the patient have any heritable connective tissue disorders (CTDs)?: uncertain/not assessed   Heritable CTD comments: That's the question    Does the patient have any acquired connective tissue disorders (CTDs)?: no/unlikely   Are there any possible alternative diagnoses that might also include joint hypermobility by means of hypotonia and/or connective tissue laxity?: no/unlikely      Additional comments   Patient with clinical EDS, needs genetic testing.                 Assessment and Plan   Diagnoses and all orders for this visit:    1. EDS (Liat-Danlos syndrome) (Primary)  -     Ambulatory Referral to Genetic Counseling/Testing    2. Hypermobility syndrome  -     Ambulatory Referral to Genetic Counseling/Testing    3. Dermatitis    4. Generalized abdominal pain    5. Encounter for screening mammogram for malignant neoplasm of breast  -     Mammo Screening Digital Tomosynthesis Bilateral With CAD; Future    Pleasant  39-year-old female here to follow-up multiple symptoms that are odd, have been ongoing since childhood and do have a familial component, it does appear clinically that she does meet the criteria for diagnosing Liat-Danlos syndrome.  We discussed that there are multiple types of EDS and that we should do genetics to see if she could be more prone to mast cell activation which could be the cause of her rash, abdominal pain.  Or potentially having some of the cardiac adverse effects.  I have not been successful with finding a  in [place], referred her to the [place] [facility] clinic for further genetic evaluation.  She has had an echocardiogram and I can ask the cardiologist to reread it depending on what her genetics results as.    With regards to her rash, I do think continuing to follow-up with dermatology is a good idea, I agree with the consideration for immunology.  I understand that [place] has an excellent program for immunology to look up the mast cell activation syndrome, Dr. Kellie price also has familiarity with this.  We discussed various options but decided to get genetics first before proceeding with referral to other specialist.    She generally is feeling encouraged and hopeful that we can find some answers to her symptoms and possible help.    Declined flu shot     I spent 54 minutes caring for Stephanie on this date of service. This time includes time spent by me in the following activities:preparing for the visit, reviewing tests, performing a medically appropriate examination and/or evaluation , counseling and educating the patient/family/caregiver, ordering medications, tests, or procedures, referring and communicating with other health care professionals , and documenting information in the medical record  Follow Up   Return in 3 months (on 4/17/2025), or if symptoms worsen or fail to improve, for Recheck hypermobility and possible mast activation syndrome..  Patient was given  instructions and counseling regarding her condition or for health maintenance advice. Please see specific information pulled into the AVS if appropriate.              DEPRESSIVE SYMPTOMS DEPRESSIVE SYMPTOMS

## 2025-01-17 NOTE — Clinical Note
Thank you for taking great care of my patient, I just wanted to share the feedback that she really appreciated the time that you took.  It actually was extremely helpful for me to know that we should pursue the EDS workup.  I really appreciate your insight.  I referred her to the [facility], I have not found a  locally who can test or assessed for EDS unless you know of someone who is closer by.  I usually have them do the immunology workup.  Dr. Hopkins does do mast activation syndrome but sometimes I have found that patients get a better workup if they start at [facility] and then transfer to Dr. Hopkins.   Please let me know if there is anything else I can do/further thoughts, have a great weekend!

## 2025-01-20 ENCOUNTER — TELEPHONE (OUTPATIENT)
Age: 40
End: 2025-01-20
Payer: MEDICAID

## 2025-01-20 LAB — C1Q SERPL-MCNC: 12.3 MG/DL (ref 10.3–20.5)

## 2025-01-27 ENCOUNTER — TELEPHONE (OUTPATIENT)
Age: 40
End: 2025-01-27

## 2025-01-29 ENCOUNTER — TELEPHONE (OUTPATIENT)
Dept: OBSTETRICS AND GYNECOLOGY | Age: 40
End: 2025-01-29

## 2025-01-29 NOTE — TELEPHONE ENCOUNTER
Pt came in the office w/ uti symptoms. Her symptoms are urine frequency, burning when urinating, and cramping. I ask pt is she having any odor or discharge she sates no . Pt states her pharmacy is updated in her chart.I ran a UA it came back all negative, I show Dr Dias her results and ask if I need to send a Urine culture he states no he said called the pt and let her know she doesn't have any sign of a infection.

## 2025-01-31 ENCOUNTER — OFFICE VISIT (OUTPATIENT)
Dept: GASTROENTEROLOGY | Facility: CLINIC | Age: 40
End: 2025-01-31

## 2025-01-31 VITALS
BODY MASS INDEX: 24.75 KG/M2 | OXYGEN SATURATION: 99 % | HEART RATE: 77 BPM | DIASTOLIC BLOOD PRESSURE: 62 MMHG | TEMPERATURE: 98 F | HEIGHT: 66 IN | SYSTOLIC BLOOD PRESSURE: 100 MMHG | WEIGHT: 154 LBS

## 2025-01-31 DIAGNOSIS — R10.13 EPIGASTRIC PAIN: Primary | ICD-10-CM

## 2025-01-31 DIAGNOSIS — R10.11 RIGHT UPPER QUADRANT ABDOMINAL PAIN: ICD-10-CM

## 2025-01-31 DIAGNOSIS — R11.0 NAUSEA: ICD-10-CM

## 2025-01-31 NOTE — PROGRESS NOTES
"Chief Complaint   Patient presents with    Abdominal Pain         History of Present Illness  Patient is a 39-year-old female who presents today for Evaluation, referred for GERD, right upper quadrant pain, and lower abdominal cramping.  Workup has included a right upper quadrant ultrasound that was normal and a CT scan showing a right ovarian cyst otherwise no acute findings.    Patient presents today for evaluation.  She reports in the summer and fall she was experiencing GI symptoms that lasted for around 3 months.  She felt nauseated on a daily basis and had pain to her epigastric area that radiated to the right upper quadrant.  Symptoms were worse after eating.  She had early satiety and bloating.  During this time her bowel movements are alternating somewhat between diarrhea and constipation.    Since that time, her symptoms fully resolved and today in the office she denies any GI complaints.    She was recently diagnosed with Erler's Danlos syndrome.  Reports she has had GI issues in the past that seem to correlate when she is having exacerbated symptoms from this.    Her grandfather and mother both have GI issues though no specific diagnosis.  Her mom had gallbladder issues.    She has had surgery to repair her prolapse and a hysterectomy and surgery for an ovarian cyst.    No family history of GI symptoms.     Result Review :       IMAGING SCANNED (11/07/2024)    IMAGING SCANNED (08/21/2024)    Patient Message with Joana Ray MD (10/21/2024)    Referral to Gastroenterology for Gastroesophageal reflux disease without esophagitis; RUQ abdominal pain; Bilateral lower abdominal cramping (10/22/2024)    Vital Signs:   /62   Pulse 77   Temp 98 °F (36.7 °C)   Ht 167.6 cm (66\")   Wt 69.9 kg (154 lb)   SpO2 99%   BMI 24.86 kg/m²     Body mass index is 24.86 kg/m².     Physical Exam  Vitals reviewed.   Constitutional:       General: She is not in acute distress.     Appearance: She is " well-developed.   HENT:      Head: Normocephalic and atraumatic.   Pulmonary:      Effort: Pulmonary effort is normal. No respiratory distress.   Abdominal:      General: Abdomen is flat. Bowel sounds are normal. There is no distension.      Palpations: Abdomen is soft.      Tenderness: There is no abdominal tenderness.   Skin:     General: Skin is dry.      Coloration: Skin is not pale.   Neurological:      Mental Status: She is alert and oriented to person, place, and time.   Psychiatric:         Thought Content: Thought content normal.           Assessment and Plan    Diagnoses and all orders for this visit:    1. Epigastric pain (Primary)    2. Right upper quadrant abdominal pain    3. Nausea         Discussion  Patient presents today for evaluation with concerns about abdominal pain, nausea, bloating, and early satiety.  Symptoms were present last summer/fall and have since fully resolved.  She has had similar symptoms in the past.  She has a history of Erler's Danlos syndrome.    Discussed symptoms could potentially have been secondary to peptic ulcer disease or gastritis which generally would take 2 to 3 months to heal.  Could also be related to gastroparesis which we can at times see increased incidence of this and other motility disorders with EDS.  We discussed consideration for continued monitoring versus EGD and gastric emptying study for further evaluation.    After our discussion, patient is comfortable with continued monitoring of symptoms.  If she experiences recurrent symptoms she will call the office and we will plan to schedule EGD and gastric emptying study while she is having symptoms.  If those were negative and symptoms persist, we may consider HIDA scan to further evaluate gallbladder.          Follow Up   Return if symptoms worsen or fail to improve.    Patient Instructions   Call if you experience any recurrent symptoms.

## 2025-02-25 RX ORDER — ONDANSETRON 4 MG/1
4 TABLET, FILM COATED ORAL EVERY 8 HOURS PRN
Qty: 20 TABLET | Refills: 0 | Status: SHIPPED | OUTPATIENT
Start: 2025-02-25

## 2025-02-26 RX ORDER — PROMETHAZINE HYDROCHLORIDE 12.5 MG/1
12.5 TABLET ORAL EVERY 6 HOURS PRN
Qty: 20 TABLET | Refills: 0 | Status: SHIPPED | OUTPATIENT
Start: 2025-02-26

## 2025-03-04 ENCOUNTER — OFFICE VISIT (OUTPATIENT)
Dept: FAMILY MEDICINE CLINIC | Facility: CLINIC | Age: 40
End: 2025-03-04

## 2025-03-04 VITALS
BODY MASS INDEX: 23.63 KG/M2 | HEART RATE: 95 BPM | TEMPERATURE: 97.8 F | WEIGHT: 147 LBS | SYSTOLIC BLOOD PRESSURE: 106 MMHG | HEIGHT: 66 IN | DIASTOLIC BLOOD PRESSURE: 72 MMHG | OXYGEN SATURATION: 99 %

## 2025-03-04 DIAGNOSIS — R53.81 MALAISE AND FATIGUE: ICD-10-CM

## 2025-03-04 DIAGNOSIS — R53.83 MALAISE AND FATIGUE: ICD-10-CM

## 2025-03-04 DIAGNOSIS — R55 POSTURAL DIZZINESS WITH NEAR SYNCOPE: ICD-10-CM

## 2025-03-04 DIAGNOSIS — R76.8 POSITIVE ANA (ANTINUCLEAR ANTIBODY): ICD-10-CM

## 2025-03-04 DIAGNOSIS — R20.9 COLD EXTREMITIES: ICD-10-CM

## 2025-03-04 DIAGNOSIS — D89.40 MAST CELL ACTIVATION SYNDROME: ICD-10-CM

## 2025-03-04 DIAGNOSIS — M35.7 HYPERMOBILITY SYNDROME: Primary | ICD-10-CM

## 2025-03-04 DIAGNOSIS — L30.9 DERMATITIS: ICD-10-CM

## 2025-03-04 DIAGNOSIS — R42 POSTURAL DIZZINESS WITH NEAR SYNCOPE: ICD-10-CM

## 2025-03-04 NOTE — Clinical Note
Hello, is it possible for me to speak with the  [facility]?  My patient unfortunately had a very unpleasant and experience.    Also, it looks like the punch biopsy only looked for eczema which is not the concern, I was hoping to look for other autoimmune causes of her symptoms.  Is it possible to add on other test to her existing biopsy?  I think she has an appointment coming up with Dr. Escamilla in May, if there is any openings that would be immensely helpful.  Thank you!

## 2025-03-04 NOTE — Clinical Note
[place], information should be on the order.  Thank you so much!

## 2025-03-04 NOTE — PROGRESS NOTES
Chief Complaint  Rash (Would like to talk about eds   and her experience  with dermatology doctor ) and Suture / Staple Removal (R Arm suture removal from  right biopsy )    Subjective        Stephanietory Cobb presents to [facility]  History of Present Illness    History of Present Illness  The patient came in to discuss several health concerns, including Liat-Danlos syndrome (EDS), eczema, feeling lightheaded, performance anxiety, delayed stomach emptying, and her feet turning purple.    She mentioned that getting an appointment with a  can take up to two years, but she found a lab that can send a genetic testing kit to her. She can mail it back, and the results will be interpreted by specialists. This way, she can get on a waitlist to see a  without waiting too long. She was given the names of two labs: one for specific chromosome reads and another for whole genome sequencing. The cost ranges from $500 to $1700, depending on the test. She also got contact information for Shanae Winslow at [facility], who can help her doctor with the ordering process. She is also thinking about increasing her protein intake and considering collagen supplements.    She had a dermatology appointment, where a biopsy showed eczema but not mast cells.  It sounds like not all the intended test were performed.  She was told that there were no mast cells and appeared more like eczema.  He offered Dupixent but she did not think this was a good option as it does not seem to be an accurate diagnosis.  This situation with her evaluation was unfortunately very upsetting.. Her symptoms tend to go away when she is sick and can stay away for months before coming back. She is looking for dietary recommendations and is hesitant about more testing.    She feels lightheaded when bending down to pick things up and has to be careful when standing up. This has been happening for a while. She also feels  "lightheaded when getting out of bed but not when going from sitting to standing. She describes almost blacking out when standing up after bending down but has never fainted. She drinks a lot of water and coffee but avoids soda and juice.    She experiences unusual reactions to adrenaline, like during public speaking. Even though she is not afraid of it, she feels weak and her hands shake visibly, which is new for her. She doesn't check her heart rate during these times, but her Apple watch shows her daily heart rate is usually in the 40s or 50s, going up to 110 or 120 during workouts and 150 or 160 during intense exercise.    She saw a gastroenterologist who suggested she might have delayed stomach emptying. She is interested in trying dietary changes to help with this.    She has noticed her feet turning purple, which she thinks is due to poor blood flow. Her hands are always cold. She got a sunburn on her foot, which caused swelling and made it hard to wear shoes. Her big toe looks more purple at night. She shaves her toes and has hair growth there. She is curious if poor blood flow is related to toenail infections.    SOCIAL HISTORY  - Does not smoke    MEDICATIONS  Current: Benadryl       Objective   Vital Signs:  /72   Pulse 95   Temp 97.8 °F (36.6 °C)   Ht 167.6 cm (66\")   Wt 66.7 kg (147 lb)   SpO2 99%   BMI 23.73 kg/m²   Estimated body mass index is 23.73 kg/m² as calculated from the following:    Height as of this encounter: 167.6 cm (66\").    Weight as of this encounter: 66.7 kg (147 lb).    BMI is within normal parameters. No other follow-up for BMI required.      Physical Exam  Vitals and nursing note reviewed.   Constitutional:       General: She is not in acute distress.     Appearance: She is well-developed.   HENT:      Head: Normocephalic.      Nose: Nose normal.   Cardiovascular:      Rate and Rhythm: Normal rate and regular rhythm.      Heart sounds: Normal heart sounds. No murmur " heard.  Pulmonary:      Effort: Pulmonary effort is normal. No respiratory distress.      Breath sounds: Normal breath sounds.   Musculoskeletal:         General: Normal range of motion.      Comments:   Both feet are cold, thready pulses, light purple discoloration, blanching, no tenderness no significant swelling possibly some mild swelling.  No here visible but she does shave her feet and legs.   Skin:     General: Skin is warm and dry.      Findings: No rash.   Neurological:      Mental Status: She is alert and oriented to person, place, and time.   Psychiatric:         Behavior: Behavior normal.         Thought Content: Thought content normal.         Judgment: Judgment normal.        Physical Exam        Result Review :                  Assessment and Plan   Diagnoses and all orders for this visit:    1. Hypermobility syndrome (Primary)    2. Dermatitis    3. Positive MELITON (antinuclear antibody)    4. Malaise and fatigue    5. Postural dizziness with near syncope    6. Cold extremities    Pleasant 39-year-old female here for multiple concerns, we spent time talking through her bizarre range of symptoms that could be related to a less common EDS type, she did get connected with an EDS specialist in [place] who does do genetic testing, with my orders they can send the cheek swab testing and help with interpreting the results.  Plan:  - Genetic testing for EDS  - I am happy to reach out to the [facility] to coordinate genetic testing as it is not available in our region, it looks like all of the nearby clinics including [place] are scheduled 1 year out.    She does have strange episodes of abdominal pain that come and go, she has seen GI for that.   Plan:  - Continue follow-up with GI    A rash that is very bothersome, very pruritic, she has had lots of allergy patch testing and unfortunately a very frustrating evaluation with a dermatologist recently.  It sounds like they mixed her chart up with another  patient's and unfortunately were difficult to help them understand that they had the wrong information.  They did do a biopsy and ruled out eczema but did not do more extensive testing.  I am happy to reach out to them to see if it is possible to run more test on the existing sample.   Plan:  -I am happy to reach out to see if it is possible to  more testing on the existing sample, she is happy to come in for another biopsy.  -Continue follow-up in May, if possible to move up appointment sooner she would be thankful.    She does have episodes of postural dizziness especially when leaning forward, sometimes she can feel close to blacking out, she has had cardiac testing in the past that was reassuring, these episodes do not occur when getting out of bed or sitting to standing.    Plan:  - We discussed increasing electrolytes  -Aiming for over 100 ounces of water a day  -Continue to assess, we could consider tilt table testing but her symptoms are relatively mild and I do not think would be truly positive for any postural tachycardia.    She is having more vasomotor responses, skin flushing, tachycardia shaky hands with speaking which she does regularly this is new for her.  It almost seems like the adrenaline rush has a bigger impact than it used to.  We discussed finding ways to bring down the parasympathetic system, deep breathing, we could consider a beta-blocker but would be more symptom management.  It does not sound specifically pathologic or dangerous to further investigate at this moment.    With regards to the cooler extremities, continue to exercise keep them warm, she does have adequate pulses, I also encouraged her to stop shaving her toes to see if she is growing here, if not we could consider ABIs.  For now we will continue with keeping her feet warm, wearing socks and exercising as she is able.       I spent 50 minutes caring for Stephanie on this date of service. This time includes time spent by me in  the following activities:preparing for the visit, reviewing tests, performing a medically appropriate examination and/or evaluation , counseling and educating the patient/family/caregiver, ordering medications, tests, or procedures, and documenting information in the medical record  Follow Up   Return if symptoms worsen or fail to improve, for Continue follow-up as scheduled.  Patient was given instructions and counseling regarding her condition or for health maintenance advice. Please see specific information pulled into the AVS if appropriate.         Joana Ray MD      Patient or patient representative verbalized consent for the use of Ambient Listening during the visit with  Joana Ray MD for chart documentation. 3/4/2025  18:44 EST

## 2025-03-06 NOTE — PROGRESS NOTES
I did call the [facility] and spoke with a staff member, Shanae Winslow it was extremely helpful.  There is a mast cell specialist in their office who may have availability as early as next week, and he may also help with better understanding the genetics that needs to be ordered as this is outside of my experience/scope of practice.  I am happy to do what ever I need to do to help with patient but also acknowledge the limitations of my knowledge.    Referral placed to Dr. Madi Reeves at the [facility] in [place], [place].     My understanding is that can request to see if he can do a virtual visit for the initial visit.  I did communicate this to the patient.

## 2025-03-12 ENCOUNTER — OFFICE VISIT (OUTPATIENT)
Age: 40
End: 2025-03-12

## 2025-03-12 VITALS
DIASTOLIC BLOOD PRESSURE: 60 MMHG | TEMPERATURE: 98 F | OXYGEN SATURATION: 98 % | WEIGHT: 147 LBS | SYSTOLIC BLOOD PRESSURE: 100 MMHG | HEIGHT: 66 IN | HEART RATE: 71 BPM | BODY MASS INDEX: 23.63 KG/M2

## 2025-03-12 DIAGNOSIS — Q79.62 HYPERMOBILE EHLERS-DANLOS SYNDROME: ICD-10-CM

## 2025-03-12 DIAGNOSIS — R76.8 POSITIVE ANA (ANTINUCLEAR ANTIBODY): Primary | ICD-10-CM

## 2025-03-12 DIAGNOSIS — R21 RASH: ICD-10-CM

## 2025-03-12 NOTE — PROGRESS NOTES
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  She was initially evaluated on 2025, and determined that she did not have clinical features of lupus, and the rashes were not typical of lupus/or scleroderma.  She was asked to follow-up with dermatology for a biopsy.  Another impression at that time was a suspicion for hypermobile/vascular EDS and she was encouraged to obtain genetic testing and EDS specialist consultation.    In the interval:  The rashes continue to fluctuate, occurring intermittently.  She saw dermatology and initial biopsy showed subacute spongiotic dermatitis.  She was offered Dupixent for management of suspected atopic dermatitis but patient declined.  She saw gastroenterology for her GI symptoms, but she was asymptomatic at the time and no objective recommendation was given.    She has continued to see her primary care physician who is helping her with referral for genetic testing, and also evaluation for suspected mast cell disorder.  Today, she is doing well, she is not having a rash flare and denies significant musculoskeletal issues.  Brief review of system is negative for oral ulceration, eye inflammation, serositis, muscle weakness, fevers or new GI changes.  She complains of nonspecific left  "calf tightness but without pain, erythema or warmth.  She has not noticed a difference in sizes of both extremities.  Her interval labs were discussed.    Results  Interval labs-1/8/25  C1q and anti-C1q antibodies negative  Marginal RNP elevation (1.0), MELITON positive-1: 160 speckled pattern  Otherwise SHYAM panel within normal  ANCA negative, APS screening negative, CYN.  CCP, creatinine kinase negative, complements within normal  Protein creatinine ratio also within normal       Outpatient Medications Prior to Visit   Medication Sig Dispense Refill    Atogepant (Qulipta) 60 MG tablet Take  by mouth.      clobetasol (TEMOVATE) 0.05 % ointment Apply 1 Application topically to the appropriate area as directed 2 (Two) Times a Day. 60 g 1    ketorolac (TORADOL) 30 MG/ML injection Inject 1 mL into the appropriate muscle as directed by prescriber Daily As Needed (migraine). 1 mL 1    ondansetron (Zofran) 4 MG tablet Take 1 tablet by mouth Every 8 (Eight) Hours As Needed for Nausea or Vomiting. 20 tablet 0    promethazine (PHENERGAN) 12.5 MG tablet Take 1 tablet by mouth Every 6 (Six) Hours As Needed for Nausea or Vomiting. 20 tablet 0    Syringe/Needle, Disp, (EasyPoint Needle/Syringe) 23G X 1\" 3 ML misc To be used to administer Toradol 30 mg/mL medication. 1 each 1    ubrogepant (Ubrelvy) 100 MG tablet Take 1 tablet by mouth 1 (One) Time As Needed (for migraine headache). May repeat dose in two hours if needed. 48 tablet 3     No facility-administered medications prior to visit.     Physical Exam  Gen: Well-developed, well-nourished adult in no apparent distress. Pleasant and cooperative. Alert and oriented.   HEENT: EOMI, MMM, oropharynx clear without oral ulcers, no lacrimal gland enlargement, normal salivary pooling, normal temporal arteries without tenderness or beading.  Chest: Normal work of breathing. CTAB without wheezing/rhonchi/rales. ??  CV: RRR, normal S1/S2, no murmurs/rubs/gallops heard. ??  Extremities: Warm, " 2+ distal pulses, no cyanosis, clubbing, or edema.  Neuro: Good comprehension/cognition. Muscle strength 5/5 in all extremities. Gait normal.??  ??Skin: No alopecia, nail change (including no nail pitting), rashes, bruising, petechiae, sclerodactyly, digital pits, telangiectasias, tophi, appreciable calcinosis, or nodules.??    Comprehensive Musculoskeletal Examination:?  - Jaw, neck without limited ROM.?  - Shoulders, elbows, wrists, hands/fingers, knees, ankles, feet/toes: No deformity, erythema, warmth, swelling, effusion, tenderness, or limited ROM.??  - left leg evaluated but no swelling, warmth or tenderness or discoloration noted   - Lumbosacral spine and hips without limited ROM.?? Negative JOSE LUIS.    Assessment & Plan  Stephanie Cobb returns for follow-up for rash, fatigue and positive MELITON.  She was initially evaluated on 1/8/2025, and determined that she did not have clinical features of lupus, and the rashes were not typical of lupus/or scleroderma or other rheumatology CTD.    Interval follow up with dermatology includes a biopsy report they suspect rule some form of atopic dermatitis but patient declined dupixent. She would like to pursue a more definitive diagnoses that explains all of her symptoms.   She is awaiting genetic testing and consult for formal EDS specialist.    Other than that she has remained stable and there is no MSK  concern at this time.     Plan:   No need for further testing   No indication for a disease modifying therapy  Will provide patient with additional contacts/resources for EDS consultation    Plan to see patient in 12 months       Diagnoses and all orders for this visit:    1. Positive MELITON (antinuclear antibody) (Primary)    2. Rash    3. 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      I spent 30 minutes caring for Stephanie on this date of service. This time includes time spent by me in the following  activities:preparing for the visit, reviewing tests, obtaining and/or reviewing a separately obtained history, performing a medically appropriate examination and/or evaluation , counseling and educating the patient/family/caregiver, documenting information in the medical record, and independently interpreting results and communicating that information with the patient/family/caregiver

## 2025-03-15 PROBLEM — Q79.62 HYPERMOBILE EHLERS-DANLOS SYNDROME: Status: ACTIVE | Noted: 2025-03-15

## 2025-03-15 PROBLEM — R21 RASH: Status: ACTIVE | Noted: 2025-03-15

## 2025-03-15 PROBLEM — R76.8 POSITIVE ANA (ANTINUCLEAR ANTIBODY): Status: ACTIVE | Noted: 2025-03-15

## 2025-03-15 NOTE — PATIENT INSTRUCTIONS
Patient seen today for follow-up  No interval change or worsening of her condition  Today's evaluation is unremarkable  She will continue to follow with primary care for pending genetic testing and EDS consult  He additional consult for EDS evaluation below.    For additional information, please refer to the following resources:    Fibromyalgia and Liat-Danlos syndrome clinic  [facility]:  Dr. Lucia Hall  [phone]  Dr. Fuller  [phone]    [facility]  Dr. Debbie Hanson (genetics)  [phone]    [facility]  Dr. Sathish Bedoya (genetics)  [phone]    [place], [place]  Dr. Eusebia Caba  [phone]    Dr. Marcell Corea at [facility] (needs formal referral)  - Normal less than 5 yo  - 6-11 has to talk to nurse  - 12 -20 can schedule with the scheduling center  - 21-24 have to talk to the nurse  - Can see up to 25th birthday  Call , option 3, option 5  Fax    Stitches placed 10 days ago, here for removal

## 2025-04-09 DIAGNOSIS — Z87.440 HISTORY OF UTI: Primary | ICD-10-CM

## 2025-04-09 DIAGNOSIS — R10.2 PELVIC PAIN: ICD-10-CM

## 2025-04-25 ENCOUNTER — OFFICE VISIT (OUTPATIENT)
Dept: FAMILY MEDICINE CLINIC | Facility: CLINIC | Age: 40
End: 2025-04-25
Payer: MEDICAID

## 2025-04-25 VITALS
OXYGEN SATURATION: 99 % | TEMPERATURE: 98.7 F | HEART RATE: 89 BPM | HEIGHT: 66 IN | BODY MASS INDEX: 23.78 KG/M2 | SYSTOLIC BLOOD PRESSURE: 102 MMHG | DIASTOLIC BLOOD PRESSURE: 62 MMHG | WEIGHT: 148 LBS

## 2025-04-25 DIAGNOSIS — Z00.00 HEALTH MAINTENANCE EXAMINATION: Primary | ICD-10-CM

## 2025-04-25 DIAGNOSIS — Z13.220 SCREENING FOR LIPID DISORDERS: ICD-10-CM

## 2025-04-25 DIAGNOSIS — R53.82 CHRONIC FATIGUE: ICD-10-CM

## 2025-04-25 DIAGNOSIS — Z13.29 SCREENING FOR THYROID DISORDER: ICD-10-CM

## 2025-04-25 DIAGNOSIS — E06.3 HYPOTHYROIDISM DUE TO HASHIMOTO'S THYROIDITIS: ICD-10-CM

## 2025-04-25 DIAGNOSIS — Q79.62 HYPERMOBILE EHLERS-DANLOS SYNDROME: ICD-10-CM

## 2025-04-25 NOTE — PROGRESS NOTES
Chief Complaint  Liat-Danlos Syndrome (Follow up, Pt claims to have some ongoing fatigue, besides that she has no complaints.)    Subjective        Stephanie Cobb presents to [facility]  History of Present Illness    History of Present Illness  The patient presents for health maintenance and evaluation of fatigue, UTI, and rash.    Intermittent fatigue has been experienced, initially attributed to her responsibilities as a mother of four young children. However, this fatigue is now believed to be distinct from usual tiredness. Similar episodes of unexplained fatigue have occurred throughout her life, previously dismissed as normal. Despite maintaining a healthy lifestyle with regular exercise, early bedtime, and a balanced diet, these episodes persist. A recent episode of chest pain occurred during a workout when her heart rate reached the 170s, but it was not severe or associated with breathing difficulties. Dr. Valdivia has not yet been consulted about these symptoms.    Two weeks ago, severe urinary tract infection (UTI) symptoms were experienced upon waking, which subsided by noon after taking Azo. The following day, she was symptom-free. A urologist diagnosed her with a nonbacterial UTI flare, despite a clean urine sample. An ultrasound revealed incomplete bladder emptying. Estrogen cream, increased fiber intake, and reduced water consumption were advised. Pelvic floor physical therapy was also recommended, with a follow-up appointment scheduled in 3 months.    An upcoming appointment with Dr. Valdivia is scheduled for May 2025. Symptoms have been managed with daily Zyrtec and nightly Benadryl, which have been effective in controlling itching.    In terms of health maintenance she has been eating very healthy exercising regularly, saw Dr. Dias with gynecology.  Up-to-date with all immunizations, not due for colorectal screening.       Objective   Vital Signs:  /62   Pulse 89    "Temp 98.7 °F (37.1 °C)   Ht 167.6 cm (65.98\")   Wt 67.1 kg (148 lb)   SpO2 99%   BMI 23.90 kg/m²   Estimated body mass index is 23.9 kg/m² as calculated from the following:    Height as of this encounter: 167.6 cm (65.98\").    Weight as of this encounter: 67.1 kg (148 lb).    BMI is within normal parameters. No other follow-up for BMI required.      Physical Exam  Vitals and nursing note reviewed.   Constitutional:       General: She is not in acute distress.     Appearance: Normal appearance. She is well-developed.   HENT:      Head: Normocephalic.      Right Ear: Tympanic membrane and ear canal normal. There is no impacted cerumen.      Left Ear: Tympanic membrane and ear canal normal. There is no impacted cerumen.      Nose: Nose normal.   Eyes:      Conjunctiva/sclera: Conjunctivae normal.      Pupils: Pupils are equal, round, and reactive to light.   Neck:      Vascular: No carotid bruit.   Cardiovascular:      Rate and Rhythm: Normal rate and regular rhythm.      Heart sounds: Normal heart sounds. No murmur heard.  Pulmonary:      Effort: Pulmonary effort is normal. No respiratory distress.      Breath sounds: Normal breath sounds.   Abdominal:      General: Abdomen is flat. Bowel sounds are normal. There is no distension.      Tenderness: There is no abdominal tenderness.   Musculoskeletal:         General: Normal range of motion.   Skin:     General: Skin is warm and dry.      Findings: No rash.   Neurological:      Mental Status: She is alert and oriented to person, place, and time.   Psychiatric:         Behavior: Behavior normal.         Thought Content: Thought content normal.         Judgment: Judgment normal.        Physical Exam        Result Review :                  Assessment and Plan   Diagnoses and all orders for this visit:    1. Health maintenance examination (Primary)    2. 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 Screening for lipid disorders  -     Cancel: Lipid " Panel  -     Lipid Panel    4. Screening for thyroid disorder  -     T4, Free  -     TSH  -     T3, Uptake  -     T3, Free  -     T3, Reverse  -     Thyroid Antibodies    5. Hypothyroidism due to Hashimoto's thyroiditis    6. Chronic fatigue  -     T4, Free  -     TSH  -     T3, Uptake  -     T3, Free  -     T3, Reverse  -     Thyroid Antibodies  -     FSH & LH      Here for annual exam, fasting labs reviewed up-to-date except for thyroid and cholesterol, will also check to see if she is perimenopausal after having her hysterectomy, immunizations up-to-date, colon cancer screening not due until 43, GYN health negative for symptoms and up-to-date, counseled patient to increase protein vegetable intake, water and exercise weekly combining weightbearing exercises and aerobic activity.       Assessment & Plan  1. Fatigue.  Chronic problem has been present for years, seems to wax and wane.  - Fatigue may be related to hormonal fluctuations, particularly progesterone spikes, which can affect exercise tolerance.  - Episodes of intense fatigue lasting several days were reported, despite maintaining a healthy lifestyle with regular exercise and a balanced diet.  - Symptoms are being monitored, and she is advised to maintain her current lifestyle modifications.  - Further evaluation will be conducted if symptoms persist.    2. Nonbacterial UTI flare.  - Experienced intense UTI symptoms that resolved with Azo.  - Urine was clean, and an ultrasound showed incomplete bladder emptying.  - Urologist recommended using estrogen cream and taking fiber supplements.  - Pelvic floor physical therapy was suggested, and further evaluation will be conducted in 3 months.    3. Rash.  Usually full-body, does wax and wane, concern for either autoimmune versus mast cell response.  She did have a positive MELITON test, autoimmune workup with rheumatology reassuring.  - Advised to keep her appointment with Dr. Lewis in 05/2025 for further evaluation  and management.  - Should discontinue antihistamines 2 days prior to the appointment to allow for an accurate assessment of her condition.  - Further evaluation will be conducted based on derm assessment.    4. Chest pain.  - Experienced chest pain when heart rate reached 170s during exercise, not associated with extreme discomfort or difficulty breathing.  - Advised to monitor heart rate during exercise and report any significant changes or persistent symptoms.  - Further evaluation will be conducted if symptoms persist.    6. Liat-Danlos syndrome (EDS).  - Diagnosed with EDS, which may contribute to symptoms, including fatigue and pelvic issues.  - Genetic testing for EDS will be ordered through [facility] or [facility], depending on which lab provides better interpretation services.  - Advised to continue with pelvic floor physical therapy and consider further treatment options if symptoms persist.  - Genetic testing ordered through [facility], order ID [id number]      Follow Up   Return in about 5 months (around 9/25/2025), or if symptoms worsen or fail to improve, for Recheck EDS.  Patient was given instructions and counseling regarding her condition or for health maintenance advice. Please see specific information pulled into the AVS if appropriate.         Joana Ray MD      Patient or patient representative verbalized consent for the use of Ambient Listening during the visit with  Joana Ray MD for chart documentation. 4/25/2025  10:17 EDT    [email or web address]    [email or web address]

## 2025-04-28 ENCOUNTER — PREP FOR SURGERY (OUTPATIENT)
Dept: SURGERY | Facility: SURGERY CENTER | Age: 40
End: 2025-04-28
Payer: MEDICAID

## 2025-04-28 ENCOUNTER — PATIENT MESSAGE (OUTPATIENT)
Dept: GASTROENTEROLOGY | Facility: CLINIC | Age: 40
End: 2025-04-28
Payer: MEDICAID

## 2025-04-28 DIAGNOSIS — R10.13 EPIGASTRIC PAIN: Primary | ICD-10-CM

## 2025-04-28 DIAGNOSIS — R10.11 RIGHT UPPER QUADRANT ABDOMINAL PAIN: ICD-10-CM

## 2025-04-28 DIAGNOSIS — R11.0 NAUSEA: ICD-10-CM

## 2025-04-28 RX ORDER — SODIUM CHLORIDE, SODIUM LACTATE, POTASSIUM CHLORIDE, CALCIUM CHLORIDE 600; 310; 30; 20 MG/100ML; MG/100ML; MG/100ML; MG/100ML
30 INJECTION, SOLUTION INTRAVENOUS CONTINUOUS PRN
OUTPATIENT
Start: 2025-04-28 | End: 2025-04-28

## 2025-04-28 RX ORDER — SODIUM CHLORIDE 0.9 % (FLUSH) 0.9 %
3 SYRINGE (ML) INJECTION EVERY 12 HOURS SCHEDULED
OUTPATIENT
Start: 2025-04-28

## 2025-04-28 RX ORDER — SODIUM CHLORIDE 0.9 % (FLUSH) 0.9 %
10 SYRINGE (ML) INJECTION AS NEEDED
OUTPATIENT
Start: 2025-04-28

## 2025-04-29 ENCOUNTER — TELEPHONE (OUTPATIENT)
Dept: GASTROENTEROLOGY | Facility: CLINIC | Age: 40
End: 2025-04-29

## 2025-04-29 NOTE — TELEPHONE ENCOUNTER
"Hub staff attempted to follow warm transfer process and was unsuccessful     Caller: Stephanie Cobb \"Stephanie Cordoba\"    Relationship to patient: Self    Best call back number: [phone]     Patient is needing: PATIENT NEEDS TO SCHEDULE GASTRIC EMPTYING STUDY.  ORDER HAS ALREADY BEEN PLACED.  SHE ALSO NEEDS TO SCHEDULE AN EGD.  PLEASE CALL BACK.     "

## 2025-05-01 ENCOUNTER — TELEPHONE (OUTPATIENT)
Dept: GASTROENTEROLOGY | Facility: CLINIC | Age: 40
End: 2025-05-01
Payer: MEDICAID

## 2025-05-01 NOTE — TELEPHONE ENCOUNTER
"        Hub staff attempted to follow warm transfer process and was unsuccessful     Caller: Stephanie Cobb \"Stephanie Cordoba\"    Relationship to patient: Self    Best call back number: [phone]    Patient is needing: SEE NOTE 04.29.25        "

## 2025-05-13 ENCOUNTER — TELEPHONE (OUTPATIENT)
Dept: FAMILY MEDICINE CLINIC | Facility: CLINIC | Age: 40
End: 2025-05-13
Payer: MEDICAID

## 2025-05-13 NOTE — TELEPHONE ENCOUNTER
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  They would need to have these alleles to express the disease.    Clinically I do think that she does meet the criteria for EDS see prior notes.  Also started pelvic floor therapy and was given report by the therapist that her symptoms are also similar to EDS.  At this time I would continue with her therapy and transition her workup to better understanding this rash that seems to be very histamine related, as she has an appointment with dermatology next week.

## 2025-05-14 ENCOUNTER — HOSPITAL ENCOUNTER (OUTPATIENT)
Dept: NUCLEAR MEDICINE | Facility: HOSPITAL | Age: 40
Discharge: HOME | End: 2025-05-14
Admitting: NURSE PRACTITIONER

## 2025-05-14 DIAGNOSIS — R10.13 EPIGASTRIC PAIN: ICD-10-CM

## 2025-05-14 DIAGNOSIS — R11.0 NAUSEA: ICD-10-CM

## 2025-05-14 PROCEDURE — A9541 TC99M SULFUR COLLOID: HCPCS | Performed by: NURSE PRACTITIONER

## 2025-05-14 PROCEDURE — 78264 GASTRIC EMPTYING IMG STUDY: CPT

## 2025-05-14 PROCEDURE — 34310000005 TECHNETIUM SULFUR COLLOID: Performed by: NURSE PRACTITIONER

## 2025-05-14 RX ADMIN — TECHNETIUM TC 99M SULFUR COLLOID 1 DOSE: KIT at 07:36

## 2025-05-18 LAB
CHOLEST SERPL-MCNC: 193 MG/DL (ref 0–200)
FSH SERPL-ACNC: 7 MIU/ML
HDLC SERPL-MCNC: 50 MG/DL (ref 40–60)
LDLC SERPL CALC-MCNC: 130 MG/DL (ref 0–100)
LH SERPL-ACNC: 3.4 MIU/ML
T3FREE SERPL-MCNC: 3.2 PG/ML (ref 2–4.4)
T3REVERSE SERPL-MCNC: 13.1 NG/DL (ref 9.2–24.1)
T3RU NFR SERPL: 26 % (ref 24–39)
T4 FREE SERPL-MCNC: 1.13 NG/DL (ref 0.92–1.68)
THYROGLOB AB SERPL-ACNC: <1 IU/ML (ref 0–0.9)
THYROPEROXIDASE AB SERPL-ACNC: 10 IU/ML (ref 0–34)
TRIGL SERPL-MCNC: 68 MG/DL (ref 0–150)
TSH SERPL DL<=0.005 MIU/L-ACNC: 1.09 UIU/ML (ref 0.27–4.2)
VLDLC SERPL CALC-MCNC: 13 MG/DL (ref 5–40)

## 2025-06-01 NOTE — TELEPHONE ENCOUNTER
Pt is started having melissa difficult time having a BM,painful intercourse and generalized cramping, pt asking if she needs to come in and see MD? Pt thinks all are related to her prolapse   Unable to assess

## 2025-06-03 ENCOUNTER — TELEPHONE (OUTPATIENT)
Dept: GASTROENTEROLOGY | Facility: CLINIC | Age: 40
End: 2025-06-03

## 2025-06-03 NOTE — TELEPHONE ENCOUNTER
"      Hub staff attempted to follow warm transfer process and was unsuccessful     Caller: Stephanie Cobb \"Stephanie Cordoba\"    Relationship to patient: Self    Best call back number: [phone]      Patient is needing: PT WANTS TO CANCEL HER PROCEDURE 6-10-25, WILL CALL BACK TO R/S. PLEASE ADVISE.           "

## 2025-06-18 ENCOUNTER — SPECIALTY PHARMACY (OUTPATIENT)
Dept: NEUROLOGY | Facility: CLINIC | Age: 40
End: 2025-06-18

## 2025-06-18 NOTE — PROGRESS NOTES
Specialty Pharmacy Patient Management Program  Refill Outreach     [facility] 2025 Patient assistance application faxed in 6/18/2025     Francheska Schmidt  6/18/2025  10:35 EDT

## 2025-06-24 ENCOUNTER — OFFICE VISIT (OUTPATIENT)
Dept: FAMILY MEDICINE CLINIC | Facility: CLINIC | Age: 40
End: 2025-06-24

## 2025-06-24 VITALS
HEIGHT: 66 IN | BODY MASS INDEX: 24.11 KG/M2 | TEMPERATURE: 97.6 F | OXYGEN SATURATION: 95 % | DIASTOLIC BLOOD PRESSURE: 70 MMHG | SYSTOLIC BLOOD PRESSURE: 110 MMHG | WEIGHT: 150 LBS | HEART RATE: 68 BPM

## 2025-06-24 DIAGNOSIS — R30.0 DYSURIA: Primary | ICD-10-CM

## 2025-06-24 DIAGNOSIS — R53.82 CHRONIC FATIGUE: ICD-10-CM

## 2025-06-24 DIAGNOSIS — M79.671 RIGHT FOOT PAIN: ICD-10-CM

## 2025-06-24 LAB
BILIRUB BLD-MCNC: NEGATIVE MG/DL
CLARITY, POC: CLEAR
COLOR UR: YELLOW
EXPIRATION DATE: NORMAL
GLUCOSE UR STRIP-MCNC: NEGATIVE MG/DL
KETONES UR QL: NEGATIVE
LEUKOCYTE EST, POC: NEGATIVE
Lab: NORMAL
NITRITE UR-MCNC: NEGATIVE MG/ML
PH UR: 7.5 [PH] (ref 5–8)
PROT UR STRIP-MCNC: NEGATIVE MG/DL
RBC # UR STRIP: NEGATIVE /UL
SP GR UR: 1.01 (ref 1–1.03)
UROBILINOGEN UR QL: NORMAL

## 2025-06-24 PROCEDURE — 81003 URINALYSIS AUTO W/O SCOPE: CPT | Performed by: FAMILY MEDICINE

## 2025-06-24 PROCEDURE — 99214 OFFICE O/P EST MOD 30 MIN: CPT | Performed by: FAMILY MEDICINE

## 2025-06-24 RX ORDER — ESTRADIOL 0.1 MG/G
2 CREAM VAGINAL DAILY
COMMUNITY
Start: 2025-04-21

## 2025-06-24 NOTE — PROGRESS NOTES
Chief Complaint  Difficulty Urinating (Burning pain for couple days  is on estradiol  )    Subjective        Stephanie Cobb presents to [facility]  Dysuria  Chronicity:  Recurrent  Onset:  In the past 7 days  Progression since onset:  Coming and going  Pain quality:  Burning  Pain - numeric:  3/10  Fever:  No fever  Sexually active?: yes    History of pyelonephritis?: no    Associated symptoms: frequency and urgency        History of Present Illness  The patient presents for evaluation of UTI symptoms, Liat-Danlos syndrome, fatigue, and right foot pain.    UTI Symptoms  She has had UTI symptoms for 4-5 days, including nocturia, dysuria, urgency every 10 minutes, and severe pain if urination is delayed. Sweating and pain on Thursday night were alleviated by Advil, but burning urination returned the next morning with lower abdominal cramping. No pain between urinations or changes in texture/discharge, but noted odor change. Itching a week prior managed with clobetasol and estradiol. Currently undergoing pelvic floor therapy. Managing symptoms with Azo and Advil.  - Onset: 4-5 days ago.  - Location: Lower abdomen.  - Duration: Persistent for 4-5 days.  - Character: Nocturia, dysuria, urgency every 10 minutes, severe pain if urination is delayed, burning urination, lower abdominal cramping, odor change.  - Alleviating/Aggravating Factors: Advil alleviated sweating and pain; Azo and Advil for symptom management.  - Timing: Sweating and pain on Thursday night; burning urination returned the next morning.  - Severity: Severe pain if urination is delayed.    Liat-Danlos Syndrome (EDS)  Diagnosed with Liat-Danlos syndrome (EDS), seeking genetic testing. Experienced dislocations throughout life, less frequent since hysterectomy in late 2023. Increased protein intake, seeking dietary advice for EDS.  - Onset: Lifelong dislocations, less frequent since hysterectomy in late 2023.  -  "Character: Dislocations.  - Alleviating/Aggravating Factors: Increased protein intake.  - Timing: Dislocations less frequent since hysterectomy in late 2023.    Fatigue  Reports fatigue upon waking despite increased sleep. Morning pain alleviated by showering and coffee. Reduced daily errands to manage fatigue, worsening over past months described as \"flare-like.\" Considering symptom journal. Noted decrease in TSH levels, curious about biotin supplements.  - Onset: Worsening over past months.  - Duration: Persistent fatigue upon waking.  - Character: Fatigue, morning pain.  - Alleviating/Aggravating Factors: Showering and coffee alleviate morning pain; reduced daily errands to manage fatigue.  - Timing: Fatigue upon waking.    Right Foot Pain, present for months, not improving  Seeing podiatrist at [facility] for foot and ankle issues. Initially suspected sesamoid bone fracture, treatment worsened condition. Advised to wear specific shoe insert, MRI offered but deemed unnecessary. Feels frequent visits without MRI. Taking biotin supplements as recommended.  - Onset: Initially suspected sesamoid bone fracture.  - Character: Foot and ankle pain.  - Alleviating/Aggravating Factors: Advised to wear specific shoe insert.  - Timing: Frequent visits without MRI.    Supplemental Information  Taking magnesium for regularity, vitamin D with K, Zyrtec daily, Benadryl at night, and migraine medication. Skin condition improved with daily Benadryl and Zyrtec.    MEDICATIONS  Current: Magnesium, vitamin D with K, Zyrtec, Benadryl, estradiol, clobetasol, migraine medicine       Objective   Vital Signs:  /70   Pulse 68   Temp 97.6 °F (36.4 °C)   Ht 167.6 cm (65.98\")   Wt 68 kg (150 lb)   SpO2 95%   BMI 24.23 kg/m²   Estimated body mass index is 24.23 kg/m² as calculated from the following:    Height as of this encounter: 167.6 cm (65.98\").    Weight as of this encounter: 68 kg (150 lb).    BMI is within normal " parameters. No other follow-up for BMI required.      Physical Exam  Vitals and nursing note reviewed.   Constitutional:       General: She is not in acute distress.     Appearance: She is well-developed.   HENT:      Head: Normocephalic.      Nose: Nose normal.   Eyes:      General: No scleral icterus.  Cardiovascular:      Rate and Rhythm: Normal rate and regular rhythm.      Heart sounds: Normal heart sounds. No murmur heard.  Pulmonary:      Effort: Pulmonary effort is normal. No respiratory distress.      Breath sounds: Normal breath sounds.   Musculoskeletal:         General: Normal range of motion.   Skin:     General: Skin is warm and dry.      Findings: No rash.   Neurological:      Mental Status: She is alert and oriented to person, place, and time.   Psychiatric:         Behavior: Behavior normal.         Thought Content: Thought content normal.         Judgment: Judgment normal.            Result Review :  The following data was reviewed by: Joana Ray MD on 06/24/2025:           Office Visit on 06/24/2025   Component Date Value Ref Range Status    Color 06/24/2025 Yellow  Yellow, Straw, Dark Yellow, Vi Final    Clarity, UA 06/24/2025 Clear  Clear Final    Specific Gravity  06/24/2025 1.015  1.005 - 1.030 Final    pH, Urine 06/24/2025 7.5  5.0 - 8.0 Final    Leukocytes 06/24/2025 Negative  Negative Final    Nitrite, UA 06/24/2025 Negative  Negative Final    Protein, POC 06/24/2025 Negative  Negative mg/dL Final    Glucose, UA 06/24/2025 Negative  Negative mg/dL Final    Ketones, UA 06/24/2025 Negative  Negative Final    Urobilinogen, UA 06/24/2025 Normal  Normal, 0.2 E.U./dL Final    Bilirubin 06/24/2025 Negative  Negative Final    Blood, UA 06/24/2025 Negative  Negative Final    Lot Number 06/24/2025 [id number]   Final    Expiration Date 06/24/2025 2/26   Final          Assessment and Plan   Diagnoses and all orders for this visit:    1. Dysuria (Primary)  -     Urine Culture - Urine, Urine, Clean  "Catch  -     POC Urinalysis Dipstick, Automated    2. Right foot pain  -     MRI foot right wo contrast; Future    3. Chronic fatigue  -     CBC Auto Differential  -     Vitamin B12 & Folate  -     Ferritin  -     Iron Profile w/o Ferritin  -     C-reactive Protein  -     Sedimentation rate, automated  -     Comprehensive Metabolic Panel           Assessment & Plan  1.  Dysuria, likely nonbacterial UTI will get culture to confirm.  Already seen by urogyn  - Symptoms for 4-5 days: nocturia, dysuria, frequent urination with pain  - Order urine culture to confirm diagnosis and rule out nonbacterial causes  - Continue Azo for symptom relief  - Monitor for changes, will treat based on culture    2. Liat-Danlos Syndrome (EDS) type III  - Diagnosis confirmed by clinical examination and previous evaluations  - Maintain healthy weight  - Engage in interval exercises to build strength and protect joints  - Recommendations for women over 40 provided, including Dr. Addie Hermosillo' resources  - Discussed that genetic testing being normal does not exclude type III EDS which would not show up on the genetic testing    3. Fatigue chronic, she feels that it almost comes in \" flares\".  She does get adequate sleep, 3 exercise eating healthy, some of the best health she has been in physically but wakes up feeling exhausted  - Possible sleep apnea?  - Record sleep or have  observe for cessation of breathing  - Conduct blood work to assess iron levels, B12, folic acid, ESR, CRP, kidney and liver function, electrolytes, and sugar levels  - Consider sleep study if lab results are normal    4. Right foot pain, located at the bottom of her foot close to the 1st and 2nd metatarsal  - Order MRI of right foot to assess sesamoid bone pain  - Consider referral to for second opinion based on MRI findings    Follow-up  - Follow-up in September 2025      Follow Up   Return if symptoms worsen or fail to improve, for Follow-up pending labs and " MRI results.  Patient was given instructions and counseling regarding her condition or for health maintenance advice. Please see specific information pulled into the AVS if appropriate.         Joana Ray MD      Patient or patient representative verbalized consent for the use of Ambient Listening during the visit with  Joana Ray MD for chart documentation. 6/24/2025  11:15 EDT

## 2025-06-25 LAB
ALBUMIN SERPL-MCNC: 4.6 G/DL (ref 3.5–5.2)
ALBUMIN/GLOB SERPL: 1.6 G/DL
ALP SERPL-CCNC: 54 U/L (ref 39–117)
ALT SERPL-CCNC: 23 U/L (ref 1–33)
AST SERPL-CCNC: 18 U/L (ref 1–32)
BASOPHILS # BLD AUTO: 0.04 10*3/MM3 (ref 0–0.2)
BASOPHILS NFR BLD AUTO: 0.8 % (ref 0–1.5)
BILIRUB SERPL-MCNC: 0.4 MG/DL (ref 0–1.2)
BUN SERPL-MCNC: 11 MG/DL (ref 6–20)
BUN/CREAT SERPL: 15.7 (ref 7–25)
CALCIUM SERPL-MCNC: 9.6 MG/DL (ref 8.6–10.5)
CHLORIDE SERPL-SCNC: 102 MMOL/L (ref 98–107)
CO2 SERPL-SCNC: 26.9 MMOL/L (ref 22–29)
CREAT SERPL-MCNC: 0.7 MG/DL (ref 0.57–1)
CRP SERPL-MCNC: <0.3 MG/DL (ref 0–0.5)
EGFRCR SERPLBLD CKD-EPI 2021: 111.6 ML/MIN/1.73
EOSINOPHIL # BLD AUTO: 0.16 10*3/MM3 (ref 0–0.4)
EOSINOPHIL NFR BLD AUTO: 3.2 % (ref 0.3–6.2)
ERYTHROCYTE [DISTWIDTH] IN BLOOD BY AUTOMATED COUNT: 11.6 % (ref 12.3–15.4)
ERYTHROCYTE [SEDIMENTATION RATE] IN BLOOD BY WESTERGREN METHOD: 2 MM/HR (ref 0–20)
FERRITIN SERPL-MCNC: 90.7 NG/ML (ref 13–150)
FOLATE SERPL-MCNC: 9.58 NG/ML (ref 4.78–24.2)
GLOBULIN SER CALC-MCNC: 2.8 GM/DL
GLUCOSE SERPL-MCNC: 85 MG/DL (ref 65–99)
HCT VFR BLD AUTO: 40.3 % (ref 34–46.6)
HGB BLD-MCNC: 13.2 G/DL (ref 12–15.9)
IMM GRANULOCYTES # BLD AUTO: 0.01 10*3/MM3 (ref 0–0.05)
IMM GRANULOCYTES NFR BLD AUTO: 0.2 % (ref 0–0.5)
IRON SATN MFR SERPL: 14 % (ref 20–50)
IRON SERPL-MCNC: 56 MCG/DL (ref 37–145)
LYMPHOCYTES # BLD AUTO: 1.25 10*3/MM3 (ref 0.7–3.1)
LYMPHOCYTES NFR BLD AUTO: 25.1 % (ref 19.6–45.3)
MCH RBC QN AUTO: 30.8 PG (ref 26.6–33)
MCHC RBC AUTO-ENTMCNC: 32.8 G/DL (ref 31.5–35.7)
MCV RBC AUTO: 94.2 FL (ref 79–97)
MONOCYTES # BLD AUTO: 0.51 10*3/MM3 (ref 0.1–0.9)
MONOCYTES NFR BLD AUTO: 10.2 % (ref 5–12)
NEUTROPHILS # BLD AUTO: 3.02 10*3/MM3 (ref 1.7–7)
NEUTROPHILS NFR BLD AUTO: 60.5 % (ref 42.7–76)
NRBC BLD AUTO-RTO: 0 /100 WBC (ref 0–0.2)
PLATELET # BLD AUTO: 266 10*3/MM3 (ref 140–450)
POTASSIUM SERPL-SCNC: 4.5 MMOL/L (ref 3.5–5.2)
PROT SERPL-MCNC: 7.4 G/DL (ref 6–8.5)
RBC # BLD AUTO: 4.28 10*6/MM3 (ref 3.77–5.28)
SODIUM SERPL-SCNC: 139 MMOL/L (ref 136–145)
TIBC SERPL-MCNC: 395 MCG/DL
UIBC SERPL-MCNC: 339 MCG/DL (ref 112–346)
VIT B12 SERPL-MCNC: 260 PG/ML (ref 211–946)
WBC # BLD AUTO: 4.99 10*3/MM3 (ref 3.4–10.8)

## 2025-06-26 LAB
BACTERIA UR CULT: NO GROWTH
BACTERIA UR CULT: NORMAL

## 2025-06-30 DIAGNOSIS — M79.671 RIGHT FOOT PAIN: ICD-10-CM

## (undated) DEVICE — ABL ASP APOLLO RF XL 90D

## (undated) DEVICE — SUT VIC 0 TN 27IN DYED JTN0G

## (undated) DEVICE — GLV SURG BIOGEL LTX PF 8

## (undated) DEVICE — HARMONIC ACE +7 LAPAROSCOPIC SHEARS ADVANCED HEMOSTASIS 5MM DIAMETER 36CM SHAFT LENGTH  FOR USE WITH GRAY HAND PIECE ONLY: Brand: HARMONIC ACE

## (undated) DEVICE — 3M™ STERI-STRIP™ REINFORCED ADHESIVE SKIN CLOSURES, R1547, 1/2 IN X 4 IN (12 MM X 100 MM), 6 STRIPS/ENVELOPE: Brand: 3M™ STERI-STRIP™

## (undated) DEVICE — LAPAROVUE VISIBILITY SYSTEM LAPAROSCOPIC SOLUTIONS: Brand: LAPAROVUE

## (undated) DEVICE — GLV SURG SIGNATURE ESSENTIAL PF LTX SZ8

## (undated) DEVICE — COVER,MAYO STAND,STERILE: Brand: MEDLINE

## (undated) DEVICE — APPL HEMO SURG ARISTA/AH/FLEXITIP XL 38CM

## (undated) DEVICE — LOU GYN LAPAROSCOPY: Brand: MEDLINE INDUSTRIES, INC.

## (undated) DEVICE — 3M™ STERI-STRIP™ REINFORCED ADHESIVE SKIN CLOSURES, R1546, 1/4 IN X 4 IN (6 MM X 100 MM), 10 STRIPS/ENVELOPE: Brand: 3M™ STERI-STRIP™

## (undated) DEVICE — CLEAR-TRAC 7.0 MM X 72 MM THREADED                                    CANNULA, WITH DISPOSABLE OBTURATOR,                                    GREY, STERILE: Brand: CLEAR-TRAC

## (undated) DEVICE — 3M™ STERI-STRIP™ COMPOUND BENZOIN TINCTURE 40 BAGS/CARTON 4 CARTONS/CASE C1544: Brand: 3M™ STERI-STRIP™

## (undated) DEVICE — ENDOPATH XCEL UNIVERSAL TROCAR STABLILITY SLEEVES: Brand: ENDOPATH XCEL

## (undated) DEVICE — 2, DISPOSABLE SUCTION/IRRIGATOR WITH DISPOSABLE TIP: Brand: STRYKEFLOW

## (undated) DEVICE — DEV COND GAS LAP INSUFLOW W/LUER CONN

## (undated) DEVICE — TROCAR: Brand: KII SLEEVE

## (undated) DEVICE — SKIN PREP TRAY W/CHG: Brand: MEDLINE INDUSTRIES, INC.

## (undated) DEVICE — CATHETER,URETHRAL,REDRUBBER,STRL,14FR: Brand: MEDLINE INDUSTRIES, INC.

## (undated) DEVICE — SUT PROLN 3/0 FS2 18IN 8665G

## (undated) DEVICE — BNDG ADHS CURAD FLX/FABRC 2X4IN STRL LF

## (undated) DEVICE — TBG ARTHSCP PT W CONN/REDUC 8FT

## (undated) DEVICE — GUIDE KT JUGGERKNOT SOFTANCHOR CRV

## (undated) DEVICE — TOTAL TRAY, 16FR 10ML SIL FOLEY, URN: Brand: MEDLINE

## (undated) DEVICE — ANTIBACTERIAL UNDYED BRAIDED (POLYGLACTIN 910), SYNTHETIC ABSORBABLE SUTURE: Brand: COATED VICRYL

## (undated) DEVICE — SOL NACL 0.9PCT 1000ML

## (undated) DEVICE — SUTURELASSO CRV 25D RT

## (undated) DEVICE — ENDOPATH PNEUMONEEDLE INSUFFLATION NEEDLES WITH LUER LOCK CONNECTORS 120MM: Brand: ENDOPATH

## (undated) DEVICE — PK ARTHSCP SHLDR TOWER 40

## (undated) DEVICE — LAPAROSCOPIC SMOKE FILTRATION SYSTEM: Brand: PALL LAPAROSHIELD® PLUS LAPAROSCOPIC SMOKE FILTRATION SYSTEM

## (undated) DEVICE — DRAPE,SHOULDER,BEACH ULTRAGARD: Brand: MEDLINE

## (undated) DEVICE — IMMOB SHLDR PAD2 UNIV SM

## (undated) DEVICE — ENDOPOUCH RETRIEVER SPECIMEN RETRIEVAL BAGS: Brand: ENDOPOUCH RETRIEVER

## (undated) DEVICE — ENDOCUT SCISSOR TIP, DISPOSABLE: Brand: RENEW

## (undated) DEVICE — TBG PENCL TELESCP MEGADYNE SMOKE EVAC 10FT

## (undated) DEVICE — SOL IRR H2O BTL 1000ML STRL

## (undated) DEVICE — SUT GUT CHRM 2/0 SH 27IN G123H

## (undated) DEVICE — Device

## (undated) DEVICE — TROCAR: Brand: KII OPTICAL ACCESS SYSTEM

## (undated) DEVICE — PAD SANI MAXI W/ADHS SNG WRP 11IN

## (undated) DEVICE — CANNULA THREADED DISP 8.5 X 72MM: Brand: CLEAR-TRAC

## (undated) DEVICE — NEEDLE, QUINCKE 22GX3.5": Brand: MEDLINE INDUSTRIES, INC.

## (undated) DEVICE — ENDOPATH XCEL BLADELESS TROCARS WITH STABILITY SLEEVES: Brand: ENDOPATH XCEL

## (undated) DEVICE — TBG ARTHSCP PUMP W CONN/REDUC 8FT

## (undated) DEVICE — IRRIGATOR BULB ASEPTO 60CC STRL

## (undated) DEVICE — 3M™ STERI-DRAPE™ INSTRUMENT POUCH 1018L: Brand: STERI-DRAPE™

## (undated) DEVICE — GLV SURG TRIUMPH CLASSIC PF LTX 8 STRL

## (undated) DEVICE — STRIP CLS WND CURAD MEDI/STRIP HYPOALLERG 0.25X4IN PK/10

## (undated) DEVICE — SYR LUERLOK 50ML

## (undated) DEVICE — SUT SILK 2/0 TIES 18IN A185H

## (undated) DEVICE — SUT GUT PLN 0 STD TIE 54IN S104H

## (undated) DEVICE — BLD SHAVER BONECUTTER 5MM 13CM

## (undated) DEVICE — UNDYED BRAIDED (POLYGLACTIN 910), SYNTHETIC ABSORBABLE SUTURE: Brand: COATED VICRYL

## (undated) DEVICE — SUT VIC 0 CT1 36IN J946H

## (undated) DEVICE — LAPAROSCOPIC DISSECTOR: Brand: DEROYAL

## (undated) DEVICE — DRSNG GZ PETROLTM XEROFORM CURAD 1X8IN STRL

## (undated) DEVICE — SYR LUERLOK 20CC BX/50

## (undated) DEVICE — VAGINAL PACKING: Brand: DEROYAL